# Patient Record
Sex: FEMALE | Race: BLACK OR AFRICAN AMERICAN | HISPANIC OR LATINO | Employment: PART TIME | URBAN - METROPOLITAN AREA
[De-identification: names, ages, dates, MRNs, and addresses within clinical notes are randomized per-mention and may not be internally consistent; named-entity substitution may affect disease eponyms.]

---

## 2017-01-03 ENCOUNTER — HOSPITAL ENCOUNTER (EMERGENCY)
Facility: HOSPITAL | Age: 22
Discharge: HOME | End: 2017-01-03
Attending: EMERGENCY MEDICINE
Payer: MEDICAID

## 2017-01-03 VITALS
BODY MASS INDEX: 31.39 KG/M2 | TEMPERATURE: 98 F | HEIGHT: 67 IN | RESPIRATION RATE: 18 BRPM | DIASTOLIC BLOOD PRESSURE: 65 MMHG | WEIGHT: 200 LBS | HEART RATE: 62 BPM | SYSTOLIC BLOOD PRESSURE: 139 MMHG | OXYGEN SATURATION: 100 %
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
## 2017-01-03 DIAGNOSIS — M25.552 LEFT HIP PAIN: ICD-10-CM

## 2017-01-03 DIAGNOSIS — S39.012A LOW BACK STRAIN, INITIAL ENCOUNTER: Primary | ICD-10-CM

## 2017-01-03 LAB
B-HCG UR QL: NEGATIVE
CTP QC/QA: YES

## 2017-01-03 PROCEDURE — 25000003 PHARM REV CODE 250: Performed by: PHYSICIAN ASSISTANT

## 2017-01-03 PROCEDURE — 99284 EMERGENCY DEPT VISIT MOD MDM: CPT

## 2017-01-03 RX ORDER — CYCLOBENZAPRINE HCL 10 MG
10 TABLET ORAL
Status: DISCONTINUED | OUTPATIENT
Start: 2017-01-03 | End: 2017-01-03 | Stop reason: HOSPADM

## 2017-01-03 RX ORDER — IBUPROFEN 600 MG/1
600 TABLET ORAL EVERY 6 HOURS PRN
Qty: 20 TABLET | Refills: 0 | Status: SHIPPED | OUTPATIENT
Start: 2017-01-03 | End: 2017-02-08

## 2017-01-03 RX ORDER — KETOROLAC TROMETHAMINE 10 MG/1
10 TABLET, FILM COATED ORAL
Status: COMPLETED | OUTPATIENT
Start: 2017-01-03 | End: 2017-01-03

## 2017-01-03 RX ORDER — CYCLOBENZAPRINE HCL 5 MG
5 TABLET ORAL 3 TIMES DAILY PRN
Qty: 15 TABLET | Refills: 0 | Status: SHIPPED | OUTPATIENT
Start: 2017-01-03 | End: 2017-01-13

## 2017-01-03 RX ADMIN — KETOROLAC TROMETHAMINE 10 MG: 10 TABLET, FILM COATED ORAL at 11:01

## 2017-01-03 NOTE — DISCHARGE INSTRUCTIONS
Back Sprain or Strain    Injury to the muscles (strain) or ligaments (sprain) around the spine can be troubling. Injury may occur after a sudden forceful twisting or bending force such as in a car accident, after a simple awkward movement, or after lifting something heavy with poor body positioning. In any case, muscle spasm is often present and adds to the pain.  Thankfully, most people feel better in 1 to 2 weeks, and most of the rest in 1 to 2 months. Most people can remain active. Unless you had a forceful or traumatic physical injury such as a car accident or fall, X-rays may not be ordered for the first evaluation of a back sprain or strain. If pain continues and does not respond to medical treatment, your healthcare provider may then order X-rays and other tests.  Home care  The following guidelines will help you care for your injury at home:  · When in bed, try to find a comfortable position. A firm mattress is best. Try lying flat on your back with pillows under your knees. You can also try lying on your side with your knees bent up toward your chest and a pillow between your knees.  · Don't sit for long periods. Try not to take long car rides or take other trips that have you sitting for a long time. This puts more stress on the lower back than standing or walking.  · During the first 24 to 72 hours after an injury or flare-up, apply an ice pack to the painful area for 20 minutes. Then remove it for 20 minutes. Do this for 60 to 90 minutes, or several times a day. This will reduce swelling and pain. Be sure to wrap the ice pack in a thin towel or plastic to protect your skin.  · You can start with ice, then switch to heat. Heat from a hot shower, hot bath, or heating pad reduces pain and works well for muscle spasms. Put heat on the painful area for 20 minutes, then remove for 20 minutes. Do this for 60 to 90 minutes, or several times a day. Do not use a heating pad while sleeping. It can burn the  skin.  · You can alternate the ice and heat. Talk with your healthcare provider to find out the best treatment or therapy for your back pain.  · Therapeutic massage will help relax the back muscles without stretching them.  · Be aware of safe lifting methods. Do not lift anything over 15 pounds until all of the pain is gone.  Medicines  Talk to your healthcare provider before using medicines, especially if you have other health problems or are taking other medicines.  · You may use acetaminophen or ibuprofen to control pain, unless another pain medicine was prescribed. If you have chronic conditions like diabetes, liver or kidney disease, stomach ulcers, or gastrointestinal bleeding, or are taking blood-thinner medicines, talk with your doctor before taking any medicines.  · Be careful if you are given prescription medicines, narcotics, or medicine for muscle spasm. They can cause drowsiness, and affect your coordination, reflexes, and judgment. Do not drive or operate heavy machinery when taking these types of medicines. Only take pain medicine as prescribed by your healthcare provider.  Follow-up care  Follow up with your healthcare provider, or as advised. You may need physical therapy or more tests if your symptoms get worse.  If you had X-rays your healthcare provider may be checking for any broken bones, breaks, or fractures. Bruises and sprains can sometimes hurt as much as a fracture. These injuries can take time to heal completely. If your symptoms dont improve or they get worse, talk with your healthcare provider. You may need a repeat X-ray or other tests.  Call 911  Call for emergency care if any of the following occur:  · Trouble breathing  · Confused  · Very drowsy or trouble awakening  · Fainting or loss of consciousness  · Rapid or very slow heart rate  · Loss of bowel or bladder control  When to seek medical advice  Call your healthcare provider right away if any of the following occur:  · Pain  gets worse or spreads to your arms or legs  · Weakness or numbness in one or both arms or legs  · Numbness in the groin or genital area  © 6886-3485 [employer]. [address] [place] [address], [place] [address]. All rights reserved. This information is not intended as a substitute for professional medical care. Always follow your healthcare professional's instructions.          Hip Contusion    A contusion is another word for a bruise. It happens when small blood vessels break open and leak blood into the nearby area. A hip contusion can result from a bump, hit, or fall. Symptoms of a contusion often include changes in skin color (bruising), swelling, and pain. It may take several hours for a deep bruise to show up. If the injury is severe, you may need an x-ray to check for broken bones. Swelling should decrease in a few days. Bruising and pain may take several weeks to go away.  Home care  · Unless another medicine was prescribed, you may take acetaminophen, ibuprofen, or naproxen to help relieve pain and swelling. If needed, stronger pain medicines may be prescribed. Take all medicines exactly as directed.  · Ice the bruised area to help reduce pain and swelling. Wrap a cold source (ice pack or ice cubes in a plastic bag) in a thin towel. Apply the cold source to the bruised area for 20 minutes every 1 to 2 hours the first day. Continue this 3 to 4 times a day until the pain and swelling goes away.  · If walking causes pain, use crutches or a walker until you can walk without pain. These items can be rented at most pharmacies and orthopedic supply stores.  · If your injury is keeping you from moving around or caring for yourself properly, you may qualify for services such as home health care. Check with your insurance company to see if this type of care is covered.  Follow-up  Follow up with your healthcare provider, or as advised.  When to seek medical advice   Call your healthcare provider right away if  any of these occur:  · Increased pain, bruising, or swelling near the injured area  · Decreased ability to bear weight on the injured side  · Pain or swelling develops below the knee  · Chest pain or shortness of breath  © 2855-9219 [employer]. [address] [place] [address], [place] [address]. All rights reserved. This information is not intended as a substitute for professional medical care. Always follow your healthcare professional's instructions.
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
## 2017-01-30 ENCOUNTER — PATIENT MESSAGE (OUTPATIENT)
Dept: OBSTETRICS AND GYNECOLOGY | Facility: CLINIC | Age: 22
End: 2017-01-30

## 2017-02-08 ENCOUNTER — OFFICE VISIT (OUTPATIENT)
Dept: OBSTETRICS AND GYNECOLOGY | Facility: CLINIC | Age: 22
End: 2017-02-08
Payer: MEDICAID

## 2017-02-08 VITALS
HEIGHT: 67 IN | SYSTOLIC BLOOD PRESSURE: 116 MMHG | BODY MASS INDEX: 31.56 KG/M2 | WEIGHT: 201.06 LBS | DIASTOLIC BLOOD PRESSURE: 60 MMHG

## 2017-02-08 DIAGNOSIS — Z12.4 CERVICAL CANCER SCREENING: ICD-10-CM

## 2017-02-08 DIAGNOSIS — Z01.419 ROUTINE GYNECOLOGICAL EXAMINATION: Primary | ICD-10-CM

## 2017-02-08 PROCEDURE — 99999 PR PBB SHADOW E&M-EST. PATIENT-LVL III: CPT | Mod: PBBFAC,,, | Performed by: OBSTETRICS & GYNECOLOGY

## 2017-02-08 PROCEDURE — 99395 PREV VISIT EST AGE 18-39: CPT | Mod: S$PBB,,, | Performed by: OBSTETRICS & GYNECOLOGY

## 2017-02-08 PROCEDURE — 88175 CYTOPATH C/V AUTO FLUID REDO: CPT

## 2017-02-08 PROCEDURE — 99213 OFFICE O/P EST LOW 20 MIN: CPT | Mod: PBBFAC,PO | Performed by: OBSTETRICS & GYNECOLOGY

## 2017-02-08 PROCEDURE — 87591 N.GONORRHOEAE DNA AMP PROB: CPT

## 2017-02-08 NOTE — PROGRESS NOTES
"23 yo  female who presents for routine gyn visit.  No complaints today. No longer on contraception because it "ruins my body"  Using condoms to prevent pregnancy.  Was on Depo provera. Did not get injection in oct 2016. Did not have menstrual cycle until 2017.  Patient's last menstrual period was 01/15/2017.  No new sex partners since last visit.  Desires STD testing.    ROS:  GENERAL: Denies weight gain or weight loss. Feeling well overall.   SKIN: Denies rash or lesions.   HEAD: Denies head injury or headache.   CARDIOVASCULAR: Denies palpitations or left sided chest pain.   ABDOMEN: No abdominal pain, constipation, diarrhea, nausea, vomiting or rectal bleeding.   URINARY: No frequency, dysuria, hematuria, or burning on urination.  REPRODUCTIVE: See HPI.   BREASTS:  denies pain, lumps, or nipple discharge.   HEMATOLOGIC: No easy bruisability or excessive bleeding.   MUSCULOSKELETAL: Denies joint pain or swelling.   NEUROLOGIC: Denies syncope or weakness.   PSYCHIATRIC: Denies depression, anxiety or mood swings.         PE:   Vitals:   Visit Vitals    /60    Ht 5' 7" (1.702 m)    Wt 91.2 kg (201 lb 1 oz)    LMP 01/15/2017    Breastfeeding No    BMI 31.49 kg/m2     APPEARANCE: Well nourished, well developed, in no acute distress.  SKIN: Normal skin turgor, no lesions.  CHEST: Lungs clear to auscultation.  HEART: Regular rate and rhythm, no murmurs, rubs or gallops.  ABDOMEN: Soft. No tenderness or masses. No hepatosplenomegaly. No hernias.  BREASTS: Symmetrical, no skin changes or visible lesions. No palpable masses, nipple discharge or adenopathy bilaterally.  PELVIC: Normal external female genitalia without lesions. Normal hair distribution. Adequate perineal body, normal urethral meatus. Vagina moist and well rugated without lesions or discharge. Cervix pink and without lesions. No significant cystocele or rectocele. Bimanual exam showed uterus normal size, shape, position, mobile and " nontender. Adnexa without masses or tenderness. Urethra and bladder normal.  EXTREMITIES: No clubbing cyanosis or edema.      AP  Routine gyn  -s/p normal breast exam:   -s/p normal pelvic exam:   -Pap collected  -STD testing: Gc/chl collected, declined HIV test  -contraception:declines, using condoms  -educated on gardasil vaccine (per patient, s/p 1 dose - will call office to schedule other doses - has to go to another appointment right now)        DANIELA Manzo MD

## 2017-02-10 LAB
C TRACH DNA SPEC QL NAA+PROBE: NEGATIVE
N GONORRHOEA DNA SPEC QL NAA+PROBE: NEGATIVE

## 2017-03-23 ENCOUNTER — PATIENT MESSAGE (OUTPATIENT)
Dept: OBSTETRICS AND GYNECOLOGY | Facility: CLINIC | Age: 22
End: 2017-03-23

## 2017-03-24 ENCOUNTER — TELEPHONE (OUTPATIENT)
Dept: OBSTETRICS AND GYNECOLOGY | Facility: CLINIC | Age: 22
End: 2017-03-24

## 2017-03-24 NOTE — TELEPHONE ENCOUNTER
Hi doctor Jovany. I was wondering if it's normal to get a period twice a month, because I have for the past two months and I'm starting to get concerned because of the pain and discomfort. It looks like a regular menstrual cycle. Nothing abnormal other than the pain.

## 2017-03-31 NOTE — TELEPHONE ENCOUNTER
"It's not "normal" to get a cycle twice a month.  Please let me know if this happens again next month.    Dr luke  "

## 2017-04-03 ENCOUNTER — TELEPHONE (OUTPATIENT)
Dept: OBSTETRICS AND GYNECOLOGY | Facility: CLINIC | Age: 22
End: 2017-04-03

## 2017-04-03 NOTE — TELEPHONE ENCOUNTER
Spoke with pt. And was told its okay to have two cycles in two months, to keep us posted if this happens again next month.  She said okay and thank you

## 2017-08-07 ENCOUNTER — OFFICE VISIT (OUTPATIENT)
Dept: OBSTETRICS AND GYNECOLOGY | Facility: CLINIC | Age: 22
End: 2017-08-07
Payer: MEDICAID

## 2017-08-07 VITALS
WEIGHT: 193.81 LBS | DIASTOLIC BLOOD PRESSURE: 66 MMHG | SYSTOLIC BLOOD PRESSURE: 104 MMHG | HEIGHT: 67 IN | BODY MASS INDEX: 30.42 KG/M2

## 2017-08-07 DIAGNOSIS — R10.2 PELVIC PAIN IN FEMALE: Primary | ICD-10-CM

## 2017-08-07 DIAGNOSIS — R10.2 PELVIC PAIN IN FEMALE: ICD-10-CM

## 2017-08-07 PROCEDURE — 3008F BODY MASS INDEX DOCD: CPT | Mod: ,,, | Performed by: OBSTETRICS & GYNECOLOGY

## 2017-08-07 PROCEDURE — 76830 TRANSVAGINAL US NON-OB: CPT | Mod: PBBFAC,PO

## 2017-08-07 PROCEDURE — 99213 OFFICE O/P EST LOW 20 MIN: CPT | Mod: S$PBB,25,, | Performed by: OBSTETRICS & GYNECOLOGY

## 2017-08-07 PROCEDURE — 99999 PR PBB SHADOW E&M-EST. PATIENT-LVL III: CPT | Mod: PBBFAC,,, | Performed by: OBSTETRICS & GYNECOLOGY

## 2017-08-07 PROCEDURE — 76830 TRANSVAGINAL US NON-OB: CPT | Mod: 26,S$PBB,, | Performed by: OBSTETRICS & GYNECOLOGY

## 2017-08-07 NOTE — PROGRESS NOTES
"21 yo  female who presents with 3 months of pelvic pain. Reports pain x 3 months, mostly LLQ.  No radiation. Nothing makes pain worse or better.  Patient with h/o constipation. Does not believe that this pain is related to constipation.  One new sexual partner since last visit here. Infrequent condom use.    ROS: per HPI    PE:   Vitals: /66   Ht 5' 7" (1.702 m)   Wt 87.9 kg (193 lb 12.6 oz)   LMP 2017   Breastfeeding? No   BMI 30.35 kg/m²   APPEARANCE: Well nourished, well developed, in no acute distress.  ABDOMEN: Soft. No tenderness or masses. No hepatosplenomegaly. No hernias. obese  PELVIC: Normal external female genitalia without lesions. Normal hair distribution. Adequate perineal body, normal urethral meatus. Vagina moist and well rugated without lesions or discharge. Cervix pink and without lesions. No significant cystocele or rectocele. Bimanual exam showed uterus normal size, shape, position, mobile and nontender. Adnexa without masses or tenderness. Urethra and bladder normal.      AP  Pelvic Pain  -UPT negative  -urine cx, urine gc/chl, affirm collected  -pelvic US: normal    If all swabs/cultures are negative for infection, recommend f/u with PCP    candace Manzo MD  "

## 2017-08-08 DIAGNOSIS — N76.0 BV (BACTERIAL VAGINOSIS): Primary | ICD-10-CM

## 2017-08-08 DIAGNOSIS — B96.89 BV (BACTERIAL VAGINOSIS): Primary | ICD-10-CM

## 2017-08-08 LAB
CANDIDA RRNA VAG QL PROBE: NEGATIVE
G VAGINALIS RRNA GENITAL QL PROBE: POSITIVE
T VAGINALIS RRNA GENITAL QL PROBE: NEGATIVE

## 2017-08-08 RX ORDER — METRONIDAZOLE 500 MG/1
TABLET ORAL
Qty: 30 TABLET | Refills: 3 | Status: SHIPPED | OUTPATIENT
Start: 2017-08-08 | End: 2017-12-27

## 2017-08-09 DIAGNOSIS — N72 CERVICITIS: Primary | ICD-10-CM

## 2017-08-09 DIAGNOSIS — Z11.3 VENEREAL DISEASE SCREENING: ICD-10-CM

## 2017-08-09 LAB
BACTERIA UR CULT: NO GROWTH
C TRACH DNA SPEC QL NAA+PROBE: NOT DETECTED
N GONORRHOEA DNA SPEC QL NAA+PROBE: DETECTED

## 2017-08-09 RX ORDER — AZITHROMYCIN 500 MG/1
TABLET, FILM COATED ORAL
Qty: 4 TABLET | Refills: 1 | Status: SHIPPED | OUTPATIENT
Start: 2017-08-09 | End: 2017-12-27

## 2017-08-09 NOTE — PROGRESS NOTES
Patient noted to be positive for gonorrhea.  Will treat with azithromycin and rocephin.  Order for HIV and RPR placed.    candace luke MD

## 2017-08-11 ENCOUNTER — TELEPHONE (OUTPATIENT)
Dept: OBSTETRICS AND GYNECOLOGY | Facility: CLINIC | Age: 22
End: 2017-08-11

## 2017-08-11 NOTE — TELEPHONE ENCOUNTER
----- Message from Alee Hale sent at 8/11/2017 10:06 AM CDT -----  Contact: [phone]  Patient is returning your call      Informed patient of results, appts were made already

## 2017-08-11 NOTE — TELEPHONE ENCOUNTER
Left a message for the patient to call the office.  Please review both affirm result and gc/ct results.  Urgent.

## 2017-12-19 ENCOUNTER — TELEPHONE (OUTPATIENT)
Dept: FAMILY MEDICINE | Facility: HOSPITAL | Age: 22
End: 2017-12-19
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
## 2017-12-27 ENCOUNTER — HOSPITAL ENCOUNTER (OUTPATIENT)
Dept: RADIOLOGY | Facility: HOSPITAL | Age: 22
Discharge: HOME | End: 2017-12-27
Attending: FAMILY MEDICINE
Payer: MEDICAID

## 2017-12-27 ENCOUNTER — OFFICE VISIT (OUTPATIENT)
Dept: FAMILY MEDICINE | Facility: HOSPITAL | Age: 22
End: 2017-12-27
Attending: FAMILY MEDICINE
Payer: MEDICAID

## 2017-12-27 VITALS
BODY MASS INDEX: 29.2 KG/M2 | DIASTOLIC BLOOD PRESSURE: 66 MMHG | HEIGHT: 68 IN | SYSTOLIC BLOOD PRESSURE: 117 MMHG | WEIGHT: 192.69 LBS | HEART RATE: 69 BPM

## 2017-12-27 DIAGNOSIS — M25.561 ACUTE PAIN OF RIGHT KNEE: Primary | ICD-10-CM

## 2017-12-27 DIAGNOSIS — M25.561 ACUTE PAIN OF RIGHT KNEE: ICD-10-CM

## 2017-12-27 PROCEDURE — 73590 X-RAY EXAM OF LOWER LEG: CPT | Mod: TC,RT

## 2017-12-27 PROCEDURE — 99214 OFFICE O/P EST MOD 30 MIN: CPT | Performed by: FAMILY MEDICINE

## 2017-12-27 PROCEDURE — 73590 X-RAY EXAM OF LOWER LEG: CPT | Mod: 26,RT,, | Performed by: RADIOLOGY

## 2017-12-27 PROCEDURE — 73564 X-RAY EXAM KNEE 4 OR MORE: CPT | Mod: TC,RT

## 2017-12-27 PROCEDURE — 73564 X-RAY EXAM KNEE 4 OR MORE: CPT | Mod: 26,RT,, | Performed by: RADIOLOGY

## 2017-12-27 RX ORDER — NAPROXEN 500 MG/1
500 TABLET ORAL 2 TIMES DAILY
Qty: 60 TABLET | Refills: 11 | Status: SHIPPED | OUTPATIENT
Start: 2017-12-27 | End: 2018-03-12
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
## 2017-12-28 NOTE — PROGRESS NOTES
I assume primary medical responsibility for this patient, I have reviewed the case history, findings, diagnosis and treatment plan with the resident and agree that the care is reasonable and necessary. This service has been performed by a resident without the presence of a teaching physician under the primary care exception  Zoe Charles  12/28/2017

## 2017-12-31 DIAGNOSIS — M25.561 ACUTE PAIN OF RIGHT KNEE: ICD-10-CM

## 2018-01-04 ENCOUNTER — HOSPITAL ENCOUNTER (OUTPATIENT)
Dept: RADIOLOGY | Facility: HOSPITAL | Age: 23
Discharge: HOME | End: 2018-01-04
Attending: FAMILY MEDICINE
Payer: MEDICAID

## 2018-01-04 DIAGNOSIS — M25.561 ACUTE PAIN OF RIGHT KNEE: ICD-10-CM

## 2018-01-04 PROCEDURE — 73700 CT LOWER EXTREMITY W/O DYE: CPT | Mod: 26,RT,, | Performed by: RADIOLOGY

## 2018-01-04 PROCEDURE — 73700 CT LOWER EXTREMITY W/O DYE: CPT | Mod: TC,RT

## 2018-01-08 DIAGNOSIS — S89.91XS INJURY OF RIGHT KNEE, SEQUELA: Primary | ICD-10-CM

## 2018-01-10 ENCOUNTER — TELEPHONE (OUTPATIENT)
Dept: FAMILY MEDICINE | Facility: HOSPITAL | Age: 23
End: 2018-01-10

## 2018-02-05 DIAGNOSIS — M25.361 INSTABILITY OF RIGHT KNEE JOINT: Primary | ICD-10-CM

## 2018-02-28 ENCOUNTER — HOSPITAL ENCOUNTER (OUTPATIENT)
Dept: RADIOLOGY | Facility: HOSPITAL | Age: 23
Discharge: HOME | End: 2018-02-28
Payer: MEDICAID

## 2018-02-28 DIAGNOSIS — M25.361 INSTABILITY OF RIGHT KNEE JOINT: ICD-10-CM

## 2018-02-28 DIAGNOSIS — S89.91XS INJURY OF RIGHT KNEE, SEQUELA: ICD-10-CM

## 2018-02-28 PROCEDURE — 73721 MRI JNT OF LWR EXTRE W/O DYE: CPT | Mod: TC,RT

## 2018-02-28 PROCEDURE — 73721 MRI JNT OF LWR EXTRE W/O DYE: CPT | Mod: 26,RT,, | Performed by: RADIOLOGY

## 2018-03-12 ENCOUNTER — OFFICE VISIT (OUTPATIENT)
Dept: OBSTETRICS AND GYNECOLOGY | Facility: CLINIC | Age: 23
End: 2018-03-12
Payer: MEDICAID

## 2018-03-12 VITALS
SYSTOLIC BLOOD PRESSURE: 110 MMHG | HEIGHT: 67 IN | BODY MASS INDEX: 29.58 KG/M2 | WEIGHT: 188.5 LBS | DIASTOLIC BLOOD PRESSURE: 80 MMHG

## 2018-03-12 DIAGNOSIS — Z11.3 SCREENING EXAMINATION FOR STD (SEXUALLY TRANSMITTED DISEASE): Primary | ICD-10-CM

## 2018-03-12 PROCEDURE — 87480 CANDIDA DNA DIR PROBE: CPT

## 2018-03-12 PROCEDURE — 99213 OFFICE O/P EST LOW 20 MIN: CPT | Mod: PBBFAC,PO | Performed by: OBSTETRICS & GYNECOLOGY

## 2018-03-12 PROCEDURE — 99999 PR PBB SHADOW E&M-EST. PATIENT-LVL III: CPT | Mod: PBBFAC,,, | Performed by: OBSTETRICS & GYNECOLOGY

## 2018-03-12 PROCEDURE — 99213 OFFICE O/P EST LOW 20 MIN: CPT | Mod: S$PBB,,, | Performed by: OBSTETRICS & GYNECOLOGY

## 2018-03-12 PROCEDURE — 87491 CHLMYD TRACH DNA AMP PROBE: CPT

## 2018-03-12 NOTE — PROGRESS NOTES
"GYNECOLOGY  :  Deepa Charlton is a 23 y.o.    Dr Manzo Patient     Here today for  Vaginal discharge and STD screening   Hx of gonorrhea last year.   Unsure if partner was tested/treated         Past Medical History:   Diagnosis Date    Asthma     Ovarian cyst     left     History reviewed. No pertinent surgical history.  Family History   Problem Relation Age of Onset    Migraines Mother      Social History   Substance Use Topics    Smoking status: Current Every Day Smoker     Packs/day: 0.33     Types: Cigarettes    Smokeless tobacco: Never Used    Alcohol use No     OB History    Para Term  AB Living   1 1 1 0 0 1   SAB TAB Ectopic Multiple Live Births   0 0 0 0 1      # Outcome Date GA Lbr Omer/2nd Weight Sex Delivery Anes PTL Lv   1 Term 10/20/15 39w4d  3.741 kg (8 lb 4 oz) M Vag-Spont Spinal, EPI N TYRELL          /80   Ht 5' 7" (1.702 m)   Wt 85.5 kg (188 lb 7.9 oz)   LMP 2018   BMI 29.52 kg/m²     [place]    ROS  GENERAL: Denies significant weight gain or weight loss. Feeling well overall.  SKIN: Denies rash or lesions.  Normal skin turgor  HEAD: Denies head injury or headache.   NODES: Denies enlarged lymph nodes.   CHEST: Denies chest pain or shortness of breath.   CARDIOVASCULAR: Denies palpitations or left sided chest pain.   ABDOMEN: No abdominal pain,no  diarrhea,constipation  nausea, vomiting or rectal bleeding.   URINARY: normal  Frequency,no  Dysuria or burning on urination.   REPRODUCTIVE: Per HPI   BREASTS: The patient sometimes performs breast self-examination and denies pain, lumps, or nipple discharge.   HEMATOLOGIC: No easy bruisability or excessive bleeding.   MUSCULOSKELETAL: Denies joint pain or swelling.   NEUROLOGIC: Denies syncope or weakness.   PSYCHIATRIC: Denies depression, anxiety or mood swings.    Physical Exam     Constitutional: She is oriented to person, place, and time. She appears well-developed and well-nourished. No distress.   HENT: "   Head: Normocephalic.   NECK: Neck symmetric without masses or thyromegaly.  Cardiovascular: Normal rate and regular rhythm.   Pulmonary/Chest: Effort normal and breath sounds normal. No respiratory distress. She has no wheezes.   Breasts: Symmetrical, no skin changes or visible lesions. No palpable masses, nipple discharge or adenopathy bilaterally.  Abdominal: Soft not distended. Bowel sounds are normal. She exhibits   no masses . No tenderness to palpation.   Genitourinary: Pelvic exam was performed with patient supine.   External genitalia normal no lesions.Normal hair distribution   Adequate perineal body,normal urethral meatus   Vagina moist and well rugated without lesions, no vaginal  Discharge.   Cervix pink and without lesions. No bleeding. No significant cystocele or rectocele.  Bimanual exam showed uterus normal size, shape and position , mobile and nontender. Adnexa without masses or tenderness. Urethra and bladder normal  Extremities normal no cyanosis ,edema.     Procedures:  STD testing     A/P Deepa Charlton 23 y.o.     Dx=  1- STD screening   2- Vaginal discharge   3-    Plan:  Will revie Lbs and cultures       Patient was counseled today on A.C.S. Pap guidelines and recommendations for yearly pelvic exams, mammograms and monthly self breast exams; to see her PCP for other health maintenance, nutrition and weight gain counseling, discussed lab values.  Discussed colonoscopy recommendations every 10 years starting at age 50   Calcium and vit D daily intake     F/u in 1 yr or BRADYN    Naren Liu M.D.   OB/GYN

## 2018-03-13 LAB
C TRACH DNA SPEC QL NAA+PROBE: NOT DETECTED
CANDIDA RRNA VAG QL PROBE: NEGATIVE
G VAGINALIS RRNA GENITAL QL PROBE: NEGATIVE
N GONORRHOEA DNA SPEC QL NAA+PROBE: NOT DETECTED
T VAGINALIS RRNA GENITAL QL PROBE: NEGATIVE

## 2018-03-14 ENCOUNTER — LAB VISIT (OUTPATIENT)
Dept: LAB | Facility: HOSPITAL | Age: 23
End: 2018-03-14
Attending: FAMILY MEDICINE
Payer: MEDICAID

## 2018-03-14 DIAGNOSIS — Z11.3 SCREENING EXAMINATION FOR STD (SEXUALLY TRANSMITTED DISEASE): ICD-10-CM

## 2018-03-14 PROCEDURE — 87340 HEPATITIS B SURFACE AG IA: CPT

## 2018-03-14 PROCEDURE — 36415 COLL VENOUS BLD VENIPUNCTURE: CPT

## 2018-03-14 PROCEDURE — 86703 HIV-1/HIV-2 1 RESULT ANTBDY: CPT

## 2018-03-14 PROCEDURE — 86592 SYPHILIS TEST NON-TREP QUAL: CPT

## 2018-03-15 LAB
HBV SURFACE AG SERPL QL IA: NEGATIVE
HIV 1+2 AB+HIV1 P24 AG SERPL QL IA: NEGATIVE
RPR SER QL: NORMAL

## 2018-03-19 ENCOUNTER — TELEPHONE (OUTPATIENT)
Dept: OBSTETRICS AND GYNECOLOGY | Facility: CLINIC | Age: 23
End: 2018-03-19

## 2018-05-02 ENCOUNTER — HOSPITAL ENCOUNTER (EMERGENCY)
Facility: HOSPITAL | Age: 23
Discharge: HOME | End: 2018-05-02
Attending: EMERGENCY MEDICINE
Payer: MEDICAID

## 2018-05-02 VITALS
HEART RATE: 54 BPM | SYSTOLIC BLOOD PRESSURE: 112 MMHG | TEMPERATURE: 98 F | OXYGEN SATURATION: 100 % | HEIGHT: 67 IN | DIASTOLIC BLOOD PRESSURE: 64 MMHG | RESPIRATION RATE: 20 BRPM | BODY MASS INDEX: 29.03 KG/M2 | WEIGHT: 185 LBS

## 2018-05-02 DIAGNOSIS — R42 DIZZINESS: ICD-10-CM

## 2018-05-02 LAB
ALBUMIN SERPL BCP-MCNC: 4.3 G/DL
ALP SERPL-CCNC: 95 U/L
ALT SERPL W/O P-5'-P-CCNC: 24 U/L
ANION GAP SERPL CALC-SCNC: 9 MMOL/L
AST SERPL-CCNC: 19 U/L
B-HCG UR QL: NEGATIVE
BASOPHILS # BLD AUTO: 0.03 K/UL
BASOPHILS NFR BLD: 0.3 %
BILIRUB SERPL-MCNC: 0.5 MG/DL
BILIRUB UR QL STRIP: NEGATIVE
BUN SERPL-MCNC: 10 MG/DL
CALCIUM SERPL-MCNC: 9.6 MG/DL
CHLORIDE SERPL-SCNC: 103 MMOL/L
CLARITY UR: CLEAR
CO2 SERPL-SCNC: 26 MMOL/L
COLOR UR: YELLOW
CREAT SERPL-MCNC: 0.7 MG/DL
CTP QC/QA: YES
DIFFERENTIAL METHOD: ABNORMAL
EOSINOPHIL # BLD AUTO: 0.2 K/UL
EOSINOPHIL NFR BLD: 1.5 %
ERYTHROCYTE [DISTWIDTH] IN BLOOD BY AUTOMATED COUNT: 12.7 %
EST. GFR  (AFRICAN AMERICAN): >60 ML/MIN/1.73 M^2
EST. GFR  (NON AFRICAN AMERICAN): >60 ML/MIN/1.73 M^2
GLUCOSE SERPL-MCNC: 96 MG/DL
GLUCOSE UR QL STRIP: NEGATIVE
HCT VFR BLD AUTO: 39.4 %
HGB BLD-MCNC: 12.8 G/DL
HGB UR QL STRIP: NEGATIVE
KETONES UR QL STRIP: NEGATIVE
LEUKOCYTE ESTERASE UR QL STRIP: NEGATIVE
LIPASE SERPL-CCNC: 14 U/L
LYMPHOCYTES # BLD AUTO: 3.2 K/UL
LYMPHOCYTES NFR BLD: 29.3 %
MCH RBC QN AUTO: 28.7 PG
MCHC RBC AUTO-ENTMCNC: 32.5 G/DL
MCV RBC AUTO: 88 FL
MONOCYTES # BLD AUTO: 0.6 K/UL
MONOCYTES NFR BLD: 5.6 %
NEUTROPHILS # BLD AUTO: 7 K/UL
NEUTROPHILS NFR BLD: 63.2 %
NITRITE UR QL STRIP: NEGATIVE
PH UR STRIP: 8 [PH] (ref 5–8)
PLATELET # BLD AUTO: 421 K/UL
PMV BLD AUTO: 9.6 FL
POTASSIUM SERPL-SCNC: 4 MMOL/L
PROT SERPL-MCNC: 8.3 G/DL
PROT UR QL STRIP: ABNORMAL
RBC # BLD AUTO: 4.46 M/UL
SODIUM SERPL-SCNC: 138 MMOL/L
SP GR UR STRIP: 1.01 (ref 1–1.03)
URN SPEC COLLECT METH UR: ABNORMAL
UROBILINOGEN UR STRIP-ACNC: NEGATIVE EU/DL
WBC # BLD AUTO: 11 K/UL

## 2018-05-02 PROCEDURE — 96365 THER/PROPH/DIAG IV INF INIT: CPT | Mod: 59

## 2018-05-02 PROCEDURE — 96375 TX/PRO/DX INJ NEW DRUG ADDON: CPT

## 2018-05-02 PROCEDURE — 80053 COMPREHEN METABOLIC PANEL: CPT

## 2018-05-02 PROCEDURE — 85025 COMPLETE CBC W/AUTO DIFF WBC: CPT

## 2018-05-02 PROCEDURE — 25500020 PHARM REV CODE 255: Performed by: EMERGENCY MEDICINE

## 2018-05-02 PROCEDURE — 99285 EMERGENCY DEPT VISIT HI MDM: CPT | Mod: 25

## 2018-05-02 PROCEDURE — 81003 URINALYSIS AUTO W/O SCOPE: CPT

## 2018-05-02 PROCEDURE — 81025 URINE PREGNANCY TEST: CPT | Performed by: EMERGENCY MEDICINE

## 2018-05-02 PROCEDURE — 83690 ASSAY OF LIPASE: CPT

## 2018-05-02 PROCEDURE — 63600175 PHARM REV CODE 636 W HCPCS: Performed by: EMERGENCY MEDICINE

## 2018-05-02 PROCEDURE — 93005 ELECTROCARDIOGRAM TRACING: CPT

## 2018-05-02 PROCEDURE — 25000003 PHARM REV CODE 250: Performed by: EMERGENCY MEDICINE

## 2018-05-02 RX ORDER — CIPROFLOXACIN 500 MG/1
500 TABLET ORAL 2 TIMES DAILY
Qty: 10 TABLET | Refills: 0 | Status: SHIPPED | OUTPATIENT
Start: 2018-05-02 | End: 2018-05-12

## 2018-05-02 RX ORDER — KETOROLAC TROMETHAMINE 30 MG/ML
30 INJECTION, SOLUTION INTRAMUSCULAR; INTRAVENOUS
Status: COMPLETED | OUTPATIENT
Start: 2018-05-02 | End: 2018-05-02

## 2018-05-02 RX ORDER — ONDANSETRON 4 MG/1
8 TABLET, ORALLY DISINTEGRATING ORAL
Status: COMPLETED | OUTPATIENT
Start: 2018-05-02 | End: 2018-05-02

## 2018-05-02 RX ORDER — SODIUM CHLORIDE 9 MG/ML
1000 INJECTION, SOLUTION INTRAVENOUS
Status: COMPLETED | OUTPATIENT
Start: 2018-05-02 | End: 2018-05-02

## 2018-05-02 RX ORDER — ONDANSETRON 4 MG/1
4 TABLET, FILM COATED ORAL EVERY 6 HOURS
Qty: 12 TABLET | Refills: 0 | Status: SHIPPED | OUTPATIENT
Start: 2018-05-02 | End: 2018-07-27

## 2018-05-02 RX ORDER — DIPHENOXYLATE HYDROCHLORIDE AND ATROPINE SULFATE 2.5; .025 MG/1; MG/1
1 TABLET ORAL 4 TIMES DAILY PRN
Qty: 10 TABLET | Refills: 0 | Status: SHIPPED | OUTPATIENT
Start: 2018-05-02 | End: 2018-05-12

## 2018-05-02 RX ORDER — HYDROMORPHONE HYDROCHLORIDE 2 MG/ML
1 INJECTION, SOLUTION INTRAMUSCULAR; INTRAVENOUS; SUBCUTANEOUS
Status: COMPLETED | OUTPATIENT
Start: 2018-05-02 | End: 2018-05-02

## 2018-05-02 RX ADMIN — PROMETHAZINE HYDROCHLORIDE 12.5 MG: 25 INJECTION INTRAMUSCULAR; INTRAVENOUS at 08:05

## 2018-05-02 RX ADMIN — ONDANSETRON 8 MG: 4 TABLET, ORALLY DISINTEGRATING ORAL at 07:05

## 2018-05-02 RX ADMIN — IOHEXOL 75 ML: 350 INJECTION, SOLUTION INTRAVENOUS at 09:05

## 2018-05-02 RX ADMIN — SODIUM CHLORIDE 1000 ML: 0.9 INJECTION, SOLUTION INTRAVENOUS at 08:05

## 2018-05-02 RX ADMIN — HYDROMORPHONE HYDROCHLORIDE 1 MG: 2 INJECTION INTRAMUSCULAR; INTRAVENOUS; SUBCUTANEOUS at 09:05

## 2018-05-02 RX ADMIN — KETOROLAC TROMETHAMINE 30 MG: 30 INJECTION, SOLUTION INTRAMUSCULAR at 09:05

## 2018-05-03 ENCOUNTER — PES CALL (OUTPATIENT)
Dept: ADMINISTRATIVE | Facility: CLINIC | Age: 23
End: 2018-05-03

## 2018-05-03 NOTE — DISCHARGE INSTRUCTIONS
Please take prescribed medications as labeled. Follow-up with your PCP within 2-3 days and return to ED if symptoms worsen or change.

## 2018-05-03 NOTE — ED NOTES
"Pt presents to the ED w/ c/o of abd pain with n/v. Pt reports that the nausea started yesterday and the vomiting started today. Pt reports that she ate a porkchop on staurday and has had abd pain since. Pt reports that since Saturday she has had intermittent LLQ abd pain. Pt reports generalized headache and body weakness. Pt states "i feel like I cant breathe."   "

## 2018-05-03 NOTE — ED NOTES
Pt did not want rest of Dilaudid medication. Half of dilaudid given to pt. Reported feeling better after the toradol was given.

## 2018-05-03 NOTE — ED NOTES
Pt actively vomiting. Pt told she must give a urine specimen before she can have some medicines. Pt replied that she will try again.

## 2018-05-03 NOTE — ED PROVIDER NOTES
Encounter Date: 5/2/2018       History     Chief Complaint   Patient presents with    Vomiting     pt to triage ambulatory and reports vomiting today and having abd pain     Abdominal Pain     Time seen by provider: 8:27 PM    This is a 23 y.o. female who presents with complaint of dizziness, nausea, vomiting, and HA today. The patient reports dizziness for the past 3-4 days, but reports two episodes of vomiting today. Patient reports that she has been having cramping LLQ abdominal pain for the past couple of years. She also states that she has been having blurred vision and chills. The patient denies any radiation of her abdominal pain, but states that her pain is sometimes a 10/10. She reports eating a fried porkchop on Saturday (4/28), after which her and her  both had episodes of diarrhea. She also reports having loose stools recently, and states that her last BM was this morning. She states that she has been well-hydrated recently. Patient denies any current diarrhea, fever, dysuria, or any other concerning symptoms. This patient has a history of recurrent migraines, but she does not report taking medication for her current HA. Patient has no prior history of vertigo, diverticulosis, or abdominal surgeries. She does have a history of asthma, but states that she has not had an exacerbation in years.      The history is provided by the patient.     Review of patient's allergies indicates:  No Known Allergies  Past Medical History:   Diagnosis Date    Asthma     Ovarian cyst     left     No past surgical history on file.  Family History   Problem Relation Age of Onset    Migraines Mother      Social History   Substance Use Topics    Smoking status: Current Every Day Smoker     Packs/day: 0.33     Types: Cigarettes    Smokeless tobacco: Never Used    Alcohol use No     Review of Systems   Constitutional: Positive for chills. Negative for fever.   HENT: Negative for facial swelling and trouble  swallowing.    Eyes: Positive for visual disturbance (blurred vision). Negative for redness.   Respiratory: Negative for shortness of breath.    Cardiovascular: Negative for chest pain.   Gastrointestinal: Positive for abdominal pain, nausea and vomiting. Negative for diarrhea.   Genitourinary: Negative for dysuria and hematuria.   Musculoskeletal: Negative for gait problem.   Skin: Negative for rash.   Neurological: Positive for dizziness and headaches. Negative for facial asymmetry and speech difficulty.       Physical Exam     Initial Vitals [05/02/18 1859]   BP Pulse Resp Temp SpO2   127/61 (!) 58 18 98.6 °F (37 °C) 99 %      MAP       83         Physical Exam    Nursing note and vitals reviewed.  Constitutional: She is not diaphoretic. She is Obese .  Non-toxic appearance. She does not have a sickly appearance.   HENT:   Head: Normocephalic.   Right Ear: Hearing normal.   Left Ear: Hearing normal.   Nose: Nose normal.   Mouth/Throat: Uvula is midline, oropharynx is clear and moist and mucous membranes are normal.   Eyes: EOM and lids are normal. Pupils are equal, round, and reactive to light. Right eye exhibits normal extraocular motion and no nystagmus. Left eye exhibits normal extraocular motion and no nystagmus.   Neck: Trachea normal, normal range of motion, full passive range of motion without pain and phonation normal. Neck supple. No spinous process tenderness and no muscular tenderness present. Normal range of motion present. No neck rigidity.   Cardiovascular: Regular rhythm. Bradycardia present.    Pulses:       Radial pulses are 2+ on the right side, and 2+ on the left side.   Pulmonary/Chest: Effort normal and breath sounds normal.   Abdominal: Soft. Normal appearance and bowel sounds are normal. She exhibits no distension and no mass. There is tenderness in the left upper quadrant and left lower quadrant. There is no rigidity, no rebound, no guarding and no CVA tenderness.   Neurological: She is  alert and oriented to person, place, and time. Gait normal. GCS eye subscore is 4. GCS verbal subscore is 5. GCS motor subscore is 6.   Skin: Skin is warm, dry and intact. Capillary refill takes less than 2 seconds. No rash noted.   Psychiatric: She has a normal mood and affect. Her speech is normal and behavior is normal.         ED Course   Procedures  Labs Reviewed   CBC W/ AUTO DIFFERENTIAL - Abnormal; Notable for the following:        Result Value    Platelets 421 (*)     All other components within normal limits   URINALYSIS - Abnormal; Notable for the following:     Protein, UA Trace (*)     All other components within normal limits   COMPREHENSIVE METABOLIC PANEL   LIPASE   POCT URINE PREGNANCY        Imaging Results          CT Abdomen Pelvis With Contrast (Final result)  Result time 05/02/18 22:03:27    Final result by Donald Vera MD (05/02/18 22:03:27)                 Impression:      No acute findings in the abdomen or pelvis.      Electronically signed by: Donald Vera MD  Date:    05/02/2018  Time:    22:03             Narrative:    EXAMINATION:  CT ABDOMEN PELVIS WITH CONTRAST    CLINICAL HISTORY:  Abd pain, fever, abscess suspected;    TECHNIQUE:  Low dose axial images, sagittal and coronal reformations were obtained from the lung bases to the pubic symphysis following the IV administration of 75 mL of Omnipaque 350 .  Oral contrast was not given.    COMPARISON:  None.    FINDINGS:  Lower Chest:    Lung bases are clear.  Heart size is normal.    Abdomen:    Liver is normal in size and contour.  No focal hepatic lesion.  Gallbladder is unremarkable. No intrahepatic biliary ductal dilatation.    Spleen, adrenals, and pancreas are unremarkable.    The kidneys are symmetric.  No hydronephrosis.    No small bowel obstruction.  No inflammatory changes identified involving the gastrointestinal tract.  Appendix is normal.    No pneumoperitoneum or organized fluid collection.    No bulky  "lymphadenopathy.    Abdominal aorta is normal in caliber.    Portal, splenic, and superior mesenteric veins are patent.    Pelvis:    Urinary bladder, pelvic organs, and rectum are unremarkable.  No free fluid in the pelvis.  No pelvic lymphadenopathy.    Bones and soft tissues:    No aggressive osseous lesions.                                 Medical Decision Making:   Initial Assessment:   Patient is a 23-year-old female who presents with complaint of dizziness, nausea, vomiting and headache since today. Pt appears well, non-toxic. TTP to LUQ and LLQ. Normal bowel sounds. No guarding, rigidity, or distention. Negative peritoneal signs.   Differential Diagnosis:   Food poisoning, gastroenteritis, diverticulitis   Clinical Tests:   Lab Tests: Ordered and Reviewed  ED Management:  CBC, CMP, lipase, POCT pregnancy, urinalysis, EKG, CT abdomen pelvis, 8 mg PO zofran, 12.5 mg phenergan, 1L IVF, 1 mg dilaudid, 30 mg toradol   CT normal. EKG normal. Pt's s/s most consistent with food poisoning. Pt states that she "feels eladia", is stable and will be d/c home. Pt will be given prescriptions for cipro, zofran, and lomotil. Pt instructed to take all of prescribed antibiotic and to f/u with her PCP within 2-3 days. Pt instructed to hydrate well with oral fluids and to return to ED if symptoms worsen or change. Pt verbalized d/c instructions and is in compliance and agreement with tx plan.    Rx: cipro, zofran, lomotil                       Clinical Impression:   The primary encounter diagnosis was Food poisoning, undetermined intent, initial encounter. A diagnosis of Dizziness was also pertinent to this visit.                           Des Dubois NP  05/02/18 [id number]       Olvin Parrish MD  05/10/18 [id number]    "

## 2018-07-27 ENCOUNTER — OFFICE VISIT (OUTPATIENT)
Dept: OBSTETRICS AND GYNECOLOGY | Facility: CLINIC | Age: 23
End: 2018-07-27
Payer: MEDICAID

## 2018-07-27 VITALS
SYSTOLIC BLOOD PRESSURE: 102 MMHG | BODY MASS INDEX: 30.86 KG/M2 | WEIGHT: 196.63 LBS | HEIGHT: 67 IN | DIASTOLIC BLOOD PRESSURE: 60 MMHG

## 2018-07-27 DIAGNOSIS — R10.2 ACUTE PELVIC PAIN, FEMALE: Primary | ICD-10-CM

## 2018-07-27 PROCEDURE — 99213 OFFICE O/P EST LOW 20 MIN: CPT | Mod: S$PBB,,, | Performed by: OBSTETRICS & GYNECOLOGY

## 2018-07-27 PROCEDURE — 99212 OFFICE O/P EST SF 10 MIN: CPT | Mod: PBBFAC,PO | Performed by: OBSTETRICS & GYNECOLOGY

## 2018-07-27 PROCEDURE — 87660 TRICHOMONAS VAGIN DIR PROBE: CPT

## 2018-07-27 PROCEDURE — 87491 CHLMYD TRACH DNA AMP PROBE: CPT

## 2018-07-27 PROCEDURE — 87086 URINE CULTURE/COLONY COUNT: CPT

## 2018-07-27 PROCEDURE — 99999 PR PBB SHADOW E&M-EST. PATIENT-LVL II: CPT | Mod: PBBFAC,,, | Performed by: OBSTETRICS & GYNECOLOGY

## 2018-07-27 NOTE — PROGRESS NOTES
"24 yo female who presents to discuss 1 month history of LLQ pain. Reports that pain starts in upper L quadrant then radiates downward toward the vagina.  Pain has been getting worse x 1 month. Improves with motrin.  No new sex partners since last visit.  Desires to have STD testing today.  Denies pain with urination. NO problems with constipation or diarrhea.    ROS: per HPI    PE:   Vitals: /60   Ht 5' 7" (1.702 m)   Wt 89.2 kg (196 lb 10.4 oz)   LMP 07/12/2018   BMI 30.80 kg/m²   APPEARANCE: Well nourished, well developed, in no acute distress.  ABDOMEN: Soft. No tenderness or masses. No hepatosplenomegaly. No hernias.  PELVIC: Normal external female genitalia without lesions. Normal hair distribution. Adequate perineal body, normal urethral meatus. Vagina moist and well rugated without lesions or discharge. Cervix pink and without lesions. No significant cystocele or rectocele. Bimanual exam showed uterus normal size, shape, position, mobile and nontender. Adnexa without masses or tenderness. Urethra and bladder normal.  EXTREMITIES: No clubbing cyanosis or edema.    AP: Pelvic pain in female  -urine cx, gc/chl and affirm collected  -pelvic US ordered    candace luke MD  " 8

## 2018-07-29 LAB
BACTERIA UR CULT: NO GROWTH
C TRACH DNA SPEC QL NAA+PROBE: NOT DETECTED
N GONORRHOEA DNA SPEC QL NAA+PROBE: NOT DETECTED

## 2018-07-30 ENCOUNTER — PROCEDURE VISIT (OUTPATIENT)
Dept: OBSTETRICS AND GYNECOLOGY | Facility: CLINIC | Age: 23
End: 2018-07-30
Payer: MEDICAID

## 2018-07-30 DIAGNOSIS — R10.2 ACUTE PELVIC PAIN, FEMALE: ICD-10-CM

## 2018-07-30 DIAGNOSIS — N76.0 BV (BACTERIAL VAGINOSIS): Primary | ICD-10-CM

## 2018-07-30 DIAGNOSIS — B96.89 BV (BACTERIAL VAGINOSIS): Primary | ICD-10-CM

## 2018-07-30 PROCEDURE — 76830 TRANSVAGINAL US NON-OB: CPT | Mod: PBBFAC,PO

## 2018-07-30 PROCEDURE — 76830 TRANSVAGINAL US NON-OB: CPT | Mod: 26,S$PBB,, | Performed by: OBSTETRICS & GYNECOLOGY

## 2018-07-30 RX ORDER — METRONIDAZOLE 500 MG/1
500 TABLET ORAL
Qty: 14 TABLET | Refills: 0 | Status: SHIPPED | OUTPATIENT
Start: 2018-07-30 | End: 2018-08-06

## 2018-08-07 ENCOUNTER — OFFICE VISIT (OUTPATIENT)
Dept: FAMILY MEDICINE | Facility: HOSPITAL | Age: 23
End: 2018-08-07
Attending: FAMILY MEDICINE
Payer: MEDICAID

## 2018-08-07 VITALS
BODY MASS INDEX: 31.52 KG/M2 | DIASTOLIC BLOOD PRESSURE: 65 MMHG | WEIGHT: 200.81 LBS | HEIGHT: 67 IN | SYSTOLIC BLOOD PRESSURE: 113 MMHG | HEART RATE: 61 BPM

## 2018-08-07 DIAGNOSIS — R10.32 INTERMITTENT LEFT LOWER QUADRANT ABDOMINAL PAIN: Primary | ICD-10-CM

## 2018-08-07 PROCEDURE — 99213 OFFICE O/P EST LOW 20 MIN: CPT | Performed by: FAMILY MEDICINE

## 2018-08-07 NOTE — PROGRESS NOTES
"Subjective:       Patient ID: Deepa Charlton is a 23 y.o. female.    Chief Complaint: Abdominal Pain    HPI Pt is 22 yo F who presents with complaints of left lower abdominal pain that began 1 month ago described as stabbing and sharp that lasts approx 20 ssec. Reports that "heavy foods" ie, fried foods and excess precipitate pain. Pain is intermittent and usually occurs approx 4x per week. Says lying down and stretching relieves pain and sitting up often makes pain worse. Pain sometimes radiates to suprapubic region. Reports no changes in bowel habits. Denies bloody stools, dysuria, GERD like symptoms. Per patient she was recently seen by ObGyn for same symptoms and diagnosed with BV and treated. Transvaginal US was ordered with no abnormal findings. Per chart check, CT abdomen/pelvis 5/2018 revealed no abnormal findings. Denies family hx of colon cancer.        Review of Systems   Constitutional: Negative for chills and fever.   Eyes: Negative for visual disturbance.   Cardiovascular: Negative for chest pain.   Gastrointestinal: Positive for abdominal pain.   Endocrine: Negative for polyuria.   Genitourinary: Negative for dysuria.   Musculoskeletal: Negative for back pain.   Skin: Negative for color change.   Neurological: Negative for headaches.   Psychiatric/Behavioral: Negative for agitation and confusion.       Objective:      Vitals:    08/07/18 1601   BP: 113/65   Pulse: 61     Physical Exam   Constitutional: She is oriented to person, place, and time. She appears well-developed and well-nourished.   HENT:   Head: Normocephalic and atraumatic.   Neck: Normal range of motion.   Cardiovascular: Normal rate and regular rhythm.    Pulmonary/Chest: Effort normal and breath sounds normal.   Abdominal: Soft. Bowel sounds are normal. There is no tenderness. There is no guarding.   Musculoskeletal: Normal range of motion. She exhibits no edema or deformity.   Neurological: She is alert and oriented to person, place, " and time.   Skin: Skin is warm.   Psychiatric: She has a normal mood and affect. Her behavior is normal.       Assessment:       1. Intermittent left lower quadrant abdominal pain        Plan:       Intermittent left lower quadrant abdominal pain  -No abnormal findings on PE. Recent Transvaginal US and CT abdomen/Pelvis with normal findings  -Recommended eliminating processed foods which seem to be precipitating factor  -Also recommended trial of probiotics  -Return precautions such as worsening abdominal pain, bloody stools etc discussed    Follow-up in about 4 weeks (around 9/4/2018). f/u abdominal pain    Stephany Mcgovern MD  8/7/2018  [facility] HO[facility]

## 2018-08-07 NOTE — PROGRESS NOTES
I have reviewed the notes, assessments, and/or procedures performed by Dr. Mcgovern, I concur with her/his documentation of Deepa Charlton. Review of labs was also negative for pancreas and liver disease.

## 2018-08-27 ENCOUNTER — ANESTHESIA EVENT (OUTPATIENT)
Dept: SURGERY | Facility: HOSPITAL | Age: 23
End: 2018-08-27
Payer: MEDICAID

## 2018-08-27 NOTE — ANESTHESIA PREPROCEDURE EVALUATION
08/27/2018     Pre-operative evaluation for Procedure(s) (LRB):  RECONSTRUCTION, KNEE, ACL, HAMSTRING AUTOGRAFT (Right)  RECONSTRUCTION, LIGAMENT, MCL, USING  ACHILLES TENDON ALLOGRAFT (Right)    Deepa Walls is a 23 y.o. female with asthma       Past Medical History:   Diagnosis Date    Asthma     Ovarian cyst     left       Review of patient's allergies indicates:  No Known Allergies     No current outpatient medications on file prior to encounter.     No current facility-administered medications on file prior to encounter.        No past surgical history on file.    Social History     Socioeconomic History    Marital status: Single     Spouse name: Not on file    Number of children: Not on file    Years of education: Not on file    Highest education level: Not on file   Social Needs    Financial resource strain: Not on file    Food insecurity - worry: Not on file    Food insecurity - inability: Not on file    Transportation needs - medical: Not on file    Transportation needs - non-medical: Not on file   Occupational History    Not on file   Tobacco Use    Smoking status: Former Smoker     Packs/day: 0.33     Types: Cigarettes    Smokeless tobacco: Never Used   Substance and Sexual Activity    Alcohol use: No    Drug use: No    Sexual activity: Yes     Partners: Male     Birth control/protection: None   Other Topics Concern    Not on file   Social History Narrative    Not on file         Vital Signs Range (Last 24H):  Wt Readings from Last 1 Encounters:   08/07/18 91.1 kg (200 lb 13.4 oz)     Temp Readings from Last 1 Encounters:   05/02/18 36.7 °C (98 °F)     BP Readings from Last 1 Encounters:   08/07/18 113/65     Pulse Readings from Last 1 Encounters:   08/07/18 61     SpO2 Readings from Last 1 Encounters:   05/02/18 100%           CBC:   Lab Results   Component Value  Date    WBC 11.00 2018    HGB 12.8 2018    HCT 39.4 2018    MCV 88 2018     (H) 2018       CMP:     Chemistry        Component Value Date/Time     2018 2017    K 4.0 2018     2018    CO2 26 2018    BUN 10 2018    CREATININE 0.7 2018    GLU 96 2018        Component Value Date/Time    CALCIUM 9.6 2018    ALKPHOS 95 2018    AST 19 2018    ALT 24 2018    BILITOT 0.5 2018    ESTGFRAFRICA >60 2018    EGFRNONAA >60 2018          INR  No results found for: INR, PROTIME      Diagnostic Studies:      EK18   Marked sinus bradycardia  Abnormal ECG  No previous ECGs available  Confirmed by Fuad YEN     2D Echo: none on file          Anesthesia Evaluation    I have reviewed the Patient Summary Reports.    I have reviewed the Nursing Notes.   I have reviewed the Medications.     Review of Systems  Anesthesia Hx:  No previous Anesthesia  Neg history of prior surgery. Denies Family Hx of Anesthesia complications.   Denies Personal Hx of Anesthesia complications.   Social:  Former Smoker Quit 4 months   Hematology/Oncology:         -- Denies Anemia:   Cardiovascular:  Cardiovascular Normal Exercise tolerance: good     Pulmonary:   Asthma asymptomatic    Renal/:  Renal/ Normal     Hepatic/GI:  Hepatic/GI Normal    Neurological:   Denies Seizures. Hx of vertigo   Endocrine:  Endocrine Normal        Physical Exam  General:  Well nourished    Airway/Jaw/Neck:  Airway Findings: Mouth Opening: Normal Tongue: Normal  General Airway Assessment: Adult  Mallampati: III  Improves to I with phonation.  Jaw/Neck Findings:  Neck ROM: Normal ROM      Dental:  Dental Findings: In tact   Chest/Lungs:  Chest/Lungs Findings: Clear to auscultation     Heart/Vascular:  Heart Findings: Rate: Normal  Rhythm: Regular Rhythm        Mental  Status:  Mental Status Findings:  Cooperative, Alert and Oriented         Anesthesia Plan  Type of Anesthesia, risks & benefits discussed:  Anesthesia Type:  general, regional  Patient's Preference:   Intra-op Monitoring Plan: standard ASA monitors  Intra-op Monitoring Plan Comments:   Post Op Pain Control Plan: multimodal analgesia and per primary service following discharge from PACU  Post Op Pain Control Plan Comments:   Induction:   IV  Beta Blocker:         Informed Consent: Patient understands risks and agrees with Anesthesia plan.  Questions answered. Anesthesia consent signed with patient.  ASA Score: 2     Day of Surgery Review of History & Physical:  There are no significant changes.          Ready For Surgery From Anesthesia Perspective.

## 2018-08-28 ENCOUNTER — HOSPITAL ENCOUNTER (OUTPATIENT)
Dept: PREADMISSION TESTING | Facility: HOSPITAL | Age: 23
Discharge: HOME | End: 2018-08-28
Attending: ORTHOPAEDIC SURGERY
Payer: MEDICAID

## 2018-08-28 VITALS
HEIGHT: 67 IN | BODY MASS INDEX: 31.49 KG/M2 | OXYGEN SATURATION: 99 % | HEART RATE: 63 BPM | RESPIRATION RATE: 18 BRPM | WEIGHT: 200.63 LBS

## 2018-08-28 DIAGNOSIS — M25.561 ACUTE PAIN OF RIGHT KNEE: ICD-10-CM

## 2018-08-28 DIAGNOSIS — Z01.818 PRE-OP TESTING: Primary | ICD-10-CM

## 2018-08-28 DIAGNOSIS — Z01.818 PRE-OP EVALUATION: ICD-10-CM

## 2018-08-28 PROCEDURE — 97161 PT EVAL LOW COMPLEX 20 MIN: CPT

## 2018-08-28 PROCEDURE — G8979 MOBILITY GOAL STATUS: HCPCS | Mod: CH

## 2018-08-28 PROCEDURE — G8980 MOBILITY D/C STATUS: HCPCS | Mod: CH

## 2018-08-28 PROCEDURE — G8978 MOBILITY CURRENT STATUS: HCPCS | Mod: CH

## 2018-08-28 RX ORDER — ACETAMINOPHEN 500 MG
1000 TABLET ORAL
Status: CANCELLED | OUTPATIENT
Start: 2018-08-30 | End: 2018-08-30

## 2018-08-28 RX ORDER — CELECOXIB 100 MG/1
400 CAPSULE ORAL
Status: CANCELLED | OUTPATIENT
Start: 2018-08-30

## 2018-08-28 RX ORDER — CEFAZOLIN SODIUM 2 G/50ML
2 SOLUTION INTRAVENOUS ONCE
Status: CANCELLED | OUTPATIENT
Start: 2018-08-30

## 2018-08-28 NOTE — PLAN OF CARE
Had dizzy spells x 1 week in May       lab and EKG on that date . Denies dizzy spells since that time

## 2018-08-28 NOTE — DISCHARGE INSTRUCTIONS
Surgery for Anterior Cruciate Ligament (ACL) Injury  The ACL (anterior cruciate ligament) is a band of tough, fibrous tissue that helps stabilize the knee. Injury to this ligament often happens when the knee is forced beyond its normal range of motion. This can stretch or tear the ligament, much like the fibers of a rope coming apart. Both surgical and nonsurgical treatment has been used to recover from an ACL tear. Several types of surgery are available based on your and your healthcare provider's preferences, as well as other factors. Some surgeons will operate soon after an ACL tear. Others prefer several weeks of physical therapy first. There are also different anesthesia choices available.  Preparing for surgery  Do's and don'ts:  · Stop taking aspirin and other medicines 7 or more days before surgery as advised by your healthcare provider.  · Arrange to get crutches to use during recovery.  · Follow any directions you are given for not eating or drinking before surgery.  · Arrange for an adult to drive you home after surgery.   During surgery      The most common type of surgery for an ACL injury is reconstruction. Several types of surgeries are used:  · Patellar tendon graft. This uses a piece of your own patellar tendon between the knee cap and tibia.  · Quadriceps tendon graft. This uses a piece of your own quadriceps tendon between the quadriceps muscle and the knee cap.  · Hamstring tendon graft. This uses a piece of your own hamstring tendon between the hamstring muscle and the tibia.  · A cadaver (allograft) tendon graft. This uses any one of several tendons from a cadaver.  To rebuild your ACL, your healthcare provider may do open surgery or arthroscopy. During arthroscopy, a long, thin tube with a tiny camera is inserted into the knee joint so your healthcare provider can see inside the joint. Tools inserted through small incisions are used to repair the joint.  After surgery  Here is what to  expect:  · Youll spend a few hours in a recovery area. Youll have ice on your knee to prevent swelling, and your leg may be in a brace.  · You may get a continuous cooling machine to relieve swelling and pain.  · You may get medicines to reduce pain and swelling. Take these as prescribed by your healthcare provider.  · Depending on the procedure, physical therapy may begin shortly after surgery. This may include light exercises. In some cases, you may use a CPM (continuous passive motion) machine for a time. This machine flexes and extends the knee, keeping it from getting stiff.  · You can usually go home the same day as surgery. Have an adult family member or friend give you a ride.  Date Last Reviewed: 9/30/2015  © 6534-0203 [employer]. [address] [place] [address]. All rights reserved. This information is not intended as a substitute for professional medical care. Always follow your healthcare professional's instructions.      Your surgery is scheduled for 08/30/2018    Please report to Outpatient Surgery Intake Office on the 2nd FLOOR at 0800 a.m.          INSTRUCTIONS IMPORTANT!!!  ¨ Do not eat or drink after 12 midnight-including water. OK to brush teeth, no   gum, candy or mints!    ¨ Take only these medicines with a small swallow of water-morning of surgery.        ____  Proceed to [facility] on *** for additional blood test.        ____  Do not wear makeup, including mascara.  ____  No powder, lotions or creams to surgical area.  ____  Please remove all jewelry, including piercings and leave at home.  ____  No money or valuables needed. Please leave at home.  ____  Please bring any documents given by your doctor.  ____  If going home the same day, arrange for a ride home. You will not be able to             drive if Anesthesia was used.  ____  Children under 18 years require a parent / guardian present the entire time             they are in surgery /  recovery.  ____  Wear loose fitting clothing. Allow for dressings, bandages.  ____  Stop Aspirin, Ibuprofen, Motrin and Aleve at least 3-5 days before surgery, unless otherwise instructed by your doctor, or the nurse.   You MAY use Tylenol/acetaminophen until day of surgery.  ____  Wash the surgical area with Hibiclens the night before surgery, and again the             morning of surgery.  Be sure to rinse hibiclens off completely (if instructed by   nurse).  ____  If you take diabetic medication, do not take am of surgery unless instructed by Doctor.  ____  Call MD for temperature above 101 degrees.  ____ Stop taking any Fish Oil supplement or any Vitamins that contain Vitamin E at least 5 days prior to surgery.  ____ Do Not wear your contact lenses the day of your procedure.  You may wear your glasses.        I have read or had read and explained to me, and understand the above information.  Additional comments or instructions:  For additional questions call [phone]      Pre-Op Bathing Instructions    Before surgery, you can play an important role in your own health.    Because skin is not sterile, we need to be sure that your skin is as free of germs as possible. By following the instructions below, you can reduce the number of germs on your skin before surgery.    IMPORTANT: You will need to shower with a special soap called Hibiclens*, available at any pharmacy.  If you are allergic to Chlorhexidine (the antiseptic in Hibiclens), use an antibacterial soap such as Dial Soap for your preoperative shower.  You will shower with Hibiclens both the night before your surgery and the morning of your surgery.  Do not use Hibiclens on the head, face or genitals to avoid injury to those areas.    STEP #1: THE NIGHT BEFORE YOUR SURGERY     1. Do not shave the area of your body where your surgery will be performed.  2. Shower and wash your hair and body as usual with your normal soap and shampoo.  3. Rinse your hair and  body thoroughly after you shower to remove all soap residue.  4. With your hand, apply one packet of Hibiclens soap to the surgical site.   5. Wash the site gently for five (5) minutes. Do not scrub your skin too hard.   6. Do not wash with your regular soap after Hibiclens is used.  7. Rinse your body thoroughly.  8. Pat yourself dry with a clean, soft towel.  9. Do not use lotion, cream, or powder.  10. Wear clean clothes.    STEP #2: THE MORNING OF YOUR SURGERY     1. Repeat Step #1.    * Not to be used by people allergic to Chlorhexidine.

## 2018-08-28 NOTE — PT/OT/SLP PROGRESS
Physical Therapy Crutch Evaluation/Training  Discharge  Deepa Walls   MRN: [id number]   Admitting Diagnosis: <principal problem not specified>  The primary encounter diagnosis was Pre-op testing. A diagnosis of Pre-op evaluation was also pertinent to this visit.                          Billable Minutes:10:00 to 10:20  Evaluation 20     Order: crutch training  Order Date: 8/28/2018    Precautions Weight Bearing Status: non weight bearing: right leg    Patient Active Problem List   Diagnosis    Tobacco use    Acute pain of right knee     Past Medical History:   Diagnosis Date    Asthma     Ovarian cyst     left     History reviewed. No pertinent surgical history.    Subjective Information     Prior level of function: independent  Residence: lives with their family 1-story house/ trailer   Support available: family  Equipment owned: None  Mental Status: Oriented X 4  Skin: Intact  Sensation: Intact  Posture: Good  Hearing: Intact  Vision:  Intact    Pain at time of assessment: 0/10 located in right leg, aggravated by activitiy, relieved by rest  .    Objective findings/Assessment  Bed mobility: Mod I  Transfers: Mod I  Gross assessment: WFL  Standing balance: Good with AD  ROM: WFL  Strength: WFL  Patient requires crutch fitting and training.    Treatment  Gait: 80 ft with AD  Stairs: Teach back method  Transfers: na  Therapeutic Exercise: na  Education provided in form of: stair climbing    AM-PAC 6 CLICK MOBILITY  How much help from another person does this patient currently need?   1 = Unable, Total/Dependent Assistance  2 = A lot, Maximum/Moderate Assistance  3 = A little, Minimum/Contact Guard/Supervision  4 = None, Modified [place]/Independent          AM-PAC Raw Score CMS G-Code Modifier Level of Impairment Assistance   6 % Total / Unable   7 - 9 CM 80 - 100% Maximal Assist   10 - 14 CL 60 - 80% Moderate Assist   15 - 19 CK 40 - 60% Moderate Assist   20 - 22 CJ 20 - 40% Minimal  Assist   23 CI 1-20% SBA / CGA   24 CH 0% Independent/ Mod I     Goals/Discharge Status  Patient safely and effectively ambulates with crutches with  modified independent,  non weight bearing: right leg on level surfaces.    Recommended Plan:  Patient to be discharged to home with family support.    Romel Enriquez, PT  08/28/2018

## 2018-08-29 NOTE — H&P
Reason For Visit    Follow-up evaluation for right multi-ligament knee injury     History of Present Illness    Patient returns today for evaluation of her right knee. I previously evaluated the knee in March of this year. She was deemed to have laxity of her ACL, PCL, and MCL. MRI verified injury to these structures. She presents today with a pain level of 2 out of 10. She reports continued episodes of her knee giving way. She denies any locking. The pain is generalized to the right knee. She denies any current relieving factors.     Allergies   · No Known Environmental Allergies   · No Known Food Allergies    Current Meds    Medication Name Instruction   Naproxen 500 MG Oral Tablet      Active Problems   · Acute pain of both knees (M25.561,M25.562)   · Asthma, acute (J45.909)   · Complete tear of medial collateral ligament of right knee, initial encounter (S83.411A)   · Instability of right knee joint (M25.361)   · Internal derangement of right knee (M23.91)   · Rupture of anterior cruciate ligament of right knee, initial encounter (S83.511A)   · Rupture of posterior cruciate ligament of right knee, initial encounter (S83.521A)    Social History   · Current every day smoker (F17.200)   · No alcohol use    Review of Systems    see HPI  no dizziness     Results/Data  I reviewed prior diagnostic studies performed at [facility]. These studies include plain x-rays, CT scan, and MRI scan. In summary, there is evidence of prior medial collateral ligament rupture (tibia based) with significant bony contusion injury to the lateral femoral condyle and the lateral tibial plateau. On the sagittal images there is evidence of a complete rupture of the ACL and at least a high-grade partial injury to the PCL. The menisci look intact although the reading states that there could be a small horizontal tear in the posterior horn of the lateral meniscus. The menisci roots appear intact.        Vitals   Recorded: [id number] 02:41PM    Height 5 ft 7 in   Weight 194 lb    BMI Calculated 30.38   BSA Calculated 2   Systolic 125   Diastolic 75   Heart Rate 72   Pain Scale 2     Physical Exam  General: Alert female, no acute distress. She appears her stated age of 23 years old.    Skin: Intact over the right knee. No rash or cellulitis.    Lymphatics: No lymphedema in the right lower extremity.    Right knee: No effusion. Range of motion 5° hyperextension to 120° of flexion. Left knee range of motion is 10° of hyperextension to 150° of flexion. Ligamentous testing reveals the following: Grade 3 laxity of the MCL to valgus stress. Grade 2 posterior drawer. 2B Lachman's. Negative lateral laxity with varus stress. Negative pivot shift on today's exam.    Neurologic: Decreased light touch sensation over the anterior aspect of the proximal tibia. Light touch is otherwise intact in the distal distributions of the peroneal tibial nerves. Gross motor exams intact to the right lower extremity.    Vascular: There is a 2+ dorsalis pedis pulse in the right foot which is symmetric to the left.        Assessment   1. Complete tear of medial collateral ligament of right knee, initial encounter (S83.411A)   2. Rupture of anterior cruciate ligament of right knee, initial encounter (S83.511A)   3. Rupture of posterior cruciate ligament of right knee, initial encounter (S83.521A)    Plan    The patient wishes to proceed with surgery for her unstable knee. I have offered her right knee diagnostic arthroscopy, meniscal and chondral treatment as indicated, ACL reconstruction, and MCL reconstruction. The risk, benefits, and alternatives were discussed.     There are no interval changes to report in the history and physical exam.

## 2018-08-30 ENCOUNTER — HOSPITAL ENCOUNTER (OUTPATIENT)
Facility: HOSPITAL | Age: 23
Discharge: HOME | End: 2018-08-30
Attending: ORTHOPAEDIC SURGERY | Admitting: ORTHOPAEDIC SURGERY
Payer: MEDICAID

## 2018-08-30 ENCOUNTER — ANESTHESIA (OUTPATIENT)
Dept: SURGERY | Facility: HOSPITAL | Age: 23
End: 2018-08-30
Payer: MEDICAID

## 2018-08-30 DIAGNOSIS — M25.561 ACUTE PAIN OF RIGHT KNEE: ICD-10-CM

## 2018-08-30 DIAGNOSIS — S83.511A CHRONIC RUPTURE OF ACL OF RIGHT KNEE: Primary | ICD-10-CM

## 2018-08-30 DIAGNOSIS — S83.519A ACL (ANTERIOR CRUCIATE LIGAMENT) RUPTURE: ICD-10-CM

## 2018-08-30 PROCEDURE — 25000003 PHARM REV CODE 250: Performed by: STUDENT IN AN ORGANIZED HEALTH CARE EDUCATION/TRAINING PROGRAM

## 2018-08-30 PROCEDURE — C1713 ANCHOR/SCREW BN/BN,TIS/BN: HCPCS | Performed by: ORTHOPAEDIC SURGERY

## 2018-08-30 PROCEDURE — 37000008 HC ANESTHESIA 1ST 15 MINUTES: Performed by: ORTHOPAEDIC SURGERY

## 2018-08-30 PROCEDURE — 27800903 OPTIME MED/SURG SUP & DEVICES OTHER IMPLANTS: Performed by: ORTHOPAEDIC SURGERY

## 2018-08-30 PROCEDURE — C1769 GUIDE WIRE: HCPCS | Performed by: ORTHOPAEDIC SURGERY

## 2018-08-30 PROCEDURE — 01400 ANES OPN/ARTHRS KNEE JT NOS: CPT | Performed by: ORTHOPAEDIC SURGERY

## 2018-08-30 PROCEDURE — 25000003 PHARM REV CODE 250: Performed by: NURSE ANESTHETIST, CERTIFIED REGISTERED

## 2018-08-30 PROCEDURE — 27201423 OPTIME MED/SURG SUP & DEVICES STERILE SUPPLY: Performed by: ORTHOPAEDIC SURGERY

## 2018-08-30 PROCEDURE — 37000009 HC ANESTHESIA EA ADD 15 MINS: Performed by: ORTHOPAEDIC SURGERY

## 2018-08-30 PROCEDURE — 71000039 HC RECOVERY, EACH ADD'L HOUR: Performed by: ORTHOPAEDIC SURGERY

## 2018-08-30 PROCEDURE — 63600175 PHARM REV CODE 636 W HCPCS: Performed by: ORTHOPAEDIC SURGERY

## 2018-08-30 PROCEDURE — 71000033 HC RECOVERY, INTIAL HOUR: Performed by: ORTHOPAEDIC SURGERY

## 2018-08-30 PROCEDURE — 63600175 PHARM REV CODE 636 W HCPCS: Performed by: STUDENT IN AN ORGANIZED HEALTH CARE EDUCATION/TRAINING PROGRAM

## 2018-08-30 PROCEDURE — 71000015 HC POSTOP RECOV 1ST HR: Performed by: ORTHOPAEDIC SURGERY

## 2018-08-30 PROCEDURE — 63600175 PHARM REV CODE 636 W HCPCS: Performed by: NURSE ANESTHETIST, CERTIFIED REGISTERED

## 2018-08-30 PROCEDURE — 63600175 PHARM REV CODE 636 W HCPCS: Performed by: ANESTHESIOLOGY

## 2018-08-30 PROCEDURE — 36000710: Performed by: ORTHOPAEDIC SURGERY

## 2018-08-30 PROCEDURE — 25000003 PHARM REV CODE 250: Performed by: ORTHOPAEDIC SURGERY

## 2018-08-30 PROCEDURE — 36000711: Performed by: ORTHOPAEDIC SURGERY

## 2018-08-30 PROCEDURE — 64447 NJX AA&/STRD FEMORAL NRV IMG: CPT | Performed by: STUDENT IN AN ORGANIZED HEALTH CARE EDUCATION/TRAINING PROGRAM

## 2018-08-30 DEVICE — GUIDE FEMORAL BULLSEYE END: Type: IMPLANTABLE DEVICE | Site: KNEE | Status: FUNCTIONAL

## 2018-08-30 DEVICE — SCREW BONE MATRYX 8X25MM: Type: IMPLANTABLE DEVICE | Site: KNEE | Status: FUNCTIONAL

## 2018-08-30 DEVICE — PIN GUIDE GRAFT PASS XACTPIN: Type: IMPLANTABLE DEVICE | Site: KNEE | Status: FUNCTIONAL

## 2018-08-30 DEVICE — TENDON ACHILLES W/CALC 19.5CM: Type: IMPLANTABLE DEVICE | Site: KNEE | Status: FUNCTIONAL

## 2018-08-30 DEVICE — IMPLANTABLE DEVICE: Type: IMPLANTABLE DEVICE | Site: KNEE | Status: FUNCTIONAL

## 2018-08-30 DEVICE — BUTTON GRAFTMAX ALB: Type: IMPLANTABLE DEVICE | Site: KNEE | Status: FUNCTIONAL

## 2018-08-30 DEVICE — PIN GRAFTMAX INDICATOR: Type: IMPLANTABLE DEVICE | Site: KNEE | Status: FUNCTIONAL

## 2018-08-30 DEVICE — GUIDE GRAFTMAX XACTPIN FLEX: Type: IMPLANTABLE DEVICE | Site: KNEE | Status: FUNCTIONAL

## 2018-08-30 RX ORDER — NEOSTIGMINE METHYLSULFATE 1 MG/ML
INJECTION, SOLUTION INTRAVENOUS
Status: DISCONTINUED | OUTPATIENT
Start: 2018-08-30 | End: 2018-08-30

## 2018-08-30 RX ORDER — SODIUM CHLORIDE, SODIUM LACTATE, POTASSIUM CHLORIDE, CALCIUM CHLORIDE 600; 310; 30; 20 MG/100ML; MG/100ML; MG/100ML; MG/100ML
INJECTION, SOLUTION INTRAVENOUS CONTINUOUS
Status: DISCONTINUED | OUTPATIENT
Start: 2018-08-30 | End: 2018-08-30 | Stop reason: HOSPADM

## 2018-08-30 RX ORDER — DIPHENHYDRAMINE HYDROCHLORIDE 50 MG/ML
12.5 INJECTION INTRAMUSCULAR; INTRAVENOUS EVERY 6 HOURS PRN
Status: DISCONTINUED | OUTPATIENT
Start: 2018-08-30 | End: 2018-08-30 | Stop reason: HOSPADM

## 2018-08-30 RX ORDER — ROCURONIUM BROMIDE 10 MG/ML
INJECTION, SOLUTION INTRAVENOUS
Status: DISCONTINUED | OUTPATIENT
Start: 2018-08-30 | End: 2018-08-30

## 2018-08-30 RX ORDER — CELECOXIB 100 MG/1
400 CAPSULE ORAL
Status: DISCONTINUED | OUTPATIENT
Start: 2018-08-30 | End: 2018-08-30 | Stop reason: HOSPADM

## 2018-08-30 RX ORDER — ACETAMINOPHEN 500 MG
1000 TABLET ORAL
Status: COMPLETED | OUTPATIENT
Start: 2018-08-30 | End: 2018-08-30

## 2018-08-30 RX ORDER — LIDOCAINE HCL/PF 100 MG/5ML
SYRINGE (ML) INTRAVENOUS
Status: DISCONTINUED | OUTPATIENT
Start: 2018-08-30 | End: 2018-08-30

## 2018-08-30 RX ORDER — HYDROMORPHONE HYDROCHLORIDE 2 MG/ML
0.5 INJECTION, SOLUTION INTRAMUSCULAR; INTRAVENOUS; SUBCUTANEOUS EVERY 5 MIN PRN
Status: ACTIVE | OUTPATIENT
Start: 2018-08-30 | End: 2018-08-30

## 2018-08-30 RX ORDER — ESMOLOL HYDROCHLORIDE 10 MG/ML
INJECTION INTRAVENOUS
Status: DISCONTINUED | OUTPATIENT
Start: 2018-08-30 | End: 2018-08-30

## 2018-08-30 RX ORDER — OXYCODONE AND ACETAMINOPHEN 5; 325 MG/1; MG/1
1 TABLET ORAL EVERY 4 HOURS PRN
Status: DISCONTINUED | OUTPATIENT
Start: 2018-08-30 | End: 2018-08-30 | Stop reason: HOSPADM

## 2018-08-30 RX ORDER — PROPOFOL 10 MG/ML
VIAL (ML) INTRAVENOUS
Status: DISCONTINUED | OUTPATIENT
Start: 2018-08-30 | End: 2018-08-30

## 2018-08-30 RX ORDER — EPINEPHRINE 1 MG/ML
INJECTION, SOLUTION INTRACARDIAC; INTRAMUSCULAR; INTRAVENOUS; SUBCUTANEOUS
Status: DISCONTINUED | OUTPATIENT
Start: 2018-08-30 | End: 2018-08-30 | Stop reason: HOSPADM

## 2018-08-30 RX ORDER — GLYCOPYRROLATE 0.2 MG/ML
INJECTION INTRAMUSCULAR; INTRAVENOUS
Status: DISCONTINUED | OUTPATIENT
Start: 2018-08-30 | End: 2018-08-30

## 2018-08-30 RX ORDER — HYDROCODONE BITARTRATE AND ACETAMINOPHEN 5; 325 MG/1; MG/1
1 TABLET ORAL EVERY 4 HOURS PRN
Status: DISCONTINUED | OUTPATIENT
Start: 2018-08-30 | End: 2018-08-30 | Stop reason: HOSPADM

## 2018-08-30 RX ORDER — MIDAZOLAM HYDROCHLORIDE 1 MG/ML
INJECTION, SOLUTION INTRAMUSCULAR; INTRAVENOUS
Status: DISCONTINUED | OUTPATIENT
Start: 2018-08-30 | End: 2018-08-30

## 2018-08-30 RX ORDER — SODIUM CHLORIDE 0.9 % (FLUSH) 0.9 %
3 SYRINGE (ML) INJECTION
Status: DISCONTINUED | OUTPATIENT
Start: 2018-08-30 | End: 2018-08-30 | Stop reason: HOSPADM

## 2018-08-30 RX ORDER — ONDANSETRON 2 MG/ML
INJECTION INTRAMUSCULAR; INTRAVENOUS
Status: DISCONTINUED | OUTPATIENT
Start: 2018-08-30 | End: 2018-08-30

## 2018-08-30 RX ORDER — ONDANSETRON 2 MG/ML
4 INJECTION INTRAMUSCULAR; INTRAVENOUS DAILY PRN
Status: DISCONTINUED | OUTPATIENT
Start: 2018-08-30 | End: 2018-08-30 | Stop reason: HOSPADM

## 2018-08-30 RX ORDER — ONDANSETRON 4 MG/1
8 TABLET, ORALLY DISINTEGRATING ORAL EVERY 8 HOURS PRN
Qty: 10 TABLET | Refills: 0 | Status: SHIPPED | OUTPATIENT
Start: 2018-08-30 | End: 2018-09-14 | Stop reason: ALTCHOICE

## 2018-08-30 RX ORDER — ONDANSETRON 2 MG/ML
4 INJECTION INTRAMUSCULAR; INTRAVENOUS DAILY PRN
Status: DISCONTINUED | OUTPATIENT
Start: 2018-08-30 | End: 2018-08-30 | Stop reason: SDUPTHER

## 2018-08-30 RX ORDER — FENTANYL CITRATE 50 UG/ML
INJECTION, SOLUTION INTRAMUSCULAR; INTRAVENOUS
Status: DISCONTINUED | OUTPATIENT
Start: 2018-08-30 | End: 2018-08-30

## 2018-08-30 RX ORDER — ONDANSETRON 2 MG/ML
4 INJECTION INTRAMUSCULAR; INTRAVENOUS EVERY 12 HOURS PRN
Status: DISCONTINUED | OUTPATIENT
Start: 2018-08-30 | End: 2018-08-30 | Stop reason: HOSPADM

## 2018-08-30 RX ORDER — CEPHALEXIN 500 MG/1
500 CAPSULE ORAL EVERY 8 HOURS
Qty: 2 CAPSULE | Refills: 0 | Status: SHIPPED | OUTPATIENT
Start: 2018-08-30 | End: 2018-09-14 | Stop reason: ALTCHOICE

## 2018-08-30 RX ORDER — HYDROCODONE BITARTRATE AND ACETAMINOPHEN 10; 325 MG/1; MG/1
1 TABLET ORAL EVERY 4 HOURS PRN
Status: DISCONTINUED | OUTPATIENT
Start: 2018-08-30 | End: 2018-08-30 | Stop reason: HOSPADM

## 2018-08-30 RX ORDER — HYDROMORPHONE HYDROCHLORIDE 2 MG/ML
0.5 INJECTION, SOLUTION INTRAMUSCULAR; INTRAVENOUS; SUBCUTANEOUS EVERY 5 MIN PRN
Status: DISPENSED | OUTPATIENT
Start: 2018-08-30 | End: 2018-08-30

## 2018-08-30 RX ORDER — OXYCODONE AND ACETAMINOPHEN 5; 325 MG/1; MG/1
1 TABLET ORAL EVERY 6 HOURS PRN
Qty: 28 TABLET | Refills: 0 | Status: SHIPPED | OUTPATIENT
Start: 2018-08-30 | End: 2018-09-14 | Stop reason: ALTCHOICE

## 2018-08-30 RX ORDER — ROPIVACAINE HYDROCHLORIDE 5 MG/ML
INJECTION, SOLUTION EPIDURAL; INFILTRATION; PERINEURAL
Status: COMPLETED | OUTPATIENT
Start: 2018-08-30 | End: 2018-08-30

## 2018-08-30 RX ORDER — LIDOCAINE HYDROCHLORIDE 10 MG/ML
1 INJECTION, SOLUTION EPIDURAL; INFILTRATION; INTRACAUDAL; PERINEURAL ONCE
Status: DISCONTINUED | OUTPATIENT
Start: 2018-08-30 | End: 2018-08-30 | Stop reason: HOSPADM

## 2018-08-30 RX ORDER — CEFAZOLIN SODIUM 2 G/50ML
2 SOLUTION INTRAVENOUS ONCE
Status: COMPLETED | OUTPATIENT
Start: 2018-08-30 | End: 2018-08-30

## 2018-08-30 RX ORDER — SUCCINYLCHOLINE CHLORIDE 20 MG/ML
INJECTION INTRAMUSCULAR; INTRAVENOUS
Status: DISCONTINUED | OUTPATIENT
Start: 2018-08-30 | End: 2018-08-30

## 2018-08-30 RX ADMIN — ROCURONIUM BROMIDE 5 MG: 10 INJECTION, SOLUTION INTRAVENOUS at 10:08

## 2018-08-30 RX ADMIN — ROPIVACAINE HYDROCHLORIDE 10 ML: 5 INJECTION, SOLUTION EPIDURAL; INFILTRATION; PERINEURAL at 09:08

## 2018-08-30 RX ADMIN — ACETAMINOPHEN 1000 MG: 500 TABLET ORAL at 08:08

## 2018-08-30 RX ADMIN — CELECOXIB 400 MG: 100 CAPSULE ORAL at 08:08

## 2018-08-30 RX ADMIN — PROPOFOL 200 MG: 10 INJECTION, EMULSION INTRAVENOUS at 10:08

## 2018-08-30 RX ADMIN — ESMOLOL HYDROCHLORIDE 50 MG: 10 INJECTION, SOLUTION INTRAVENOUS at 10:08

## 2018-08-30 RX ADMIN — MIDAZOLAM 5 MG: 1 INJECTION INTRAMUSCULAR; INTRAVENOUS at 09:08

## 2018-08-30 RX ADMIN — ONDANSETRON 8 MG: 2 INJECTION, SOLUTION INTRAMUSCULAR; INTRAVENOUS at 01:08

## 2018-08-30 RX ADMIN — HYDROMORPHONE HYDROCHLORIDE 0.5 MG: 2 INJECTION INTRAMUSCULAR; INTRAVENOUS; SUBCUTANEOUS at 02:08

## 2018-08-30 RX ADMIN — NEOSTIGMINE METHYLSULFATE 4 MG: 1 INJECTION INTRAVENOUS at 01:08

## 2018-08-30 RX ADMIN — SODIUM CHLORIDE, SODIUM LACTATE, POTASSIUM CHLORIDE, AND CALCIUM CHLORIDE: .6; .31; .03; .02 INJECTION, SOLUTION INTRAVENOUS at 09:08

## 2018-08-30 RX ADMIN — GLYCOPYRROLATE 0.6 MG: 0.2 INJECTION, SOLUTION INTRAMUSCULAR; INTRAVENOUS at 01:08

## 2018-08-30 RX ADMIN — LIDOCAINE HYDROCHLORIDE 80 MG: 20 INJECTION, SOLUTION INTRAVENOUS at 10:08

## 2018-08-30 RX ADMIN — SODIUM CHLORIDE, SODIUM LACTATE, POTASSIUM CHLORIDE, AND CALCIUM CHLORIDE 10 ML/HR: .6; .31; .03; .02 INJECTION, SOLUTION INTRAVENOUS at 08:08

## 2018-08-30 RX ADMIN — FENTANYL CITRATE 50 MCG: 50 INJECTION, SOLUTION INTRAMUSCULAR; INTRAVENOUS at 10:08

## 2018-08-30 RX ADMIN — ROCURONIUM BROMIDE 25 MG: 10 INJECTION, SOLUTION INTRAVENOUS at 10:08

## 2018-08-30 RX ADMIN — SUCCINYLCHOLINE CHLORIDE 120 MG: 20 INJECTION, SOLUTION INTRAMUSCULAR; INTRAVENOUS at 10:08

## 2018-08-30 RX ADMIN — FENTANYL CITRATE 50 MCG: 50 INJECTION, SOLUTION INTRAMUSCULAR; INTRAVENOUS at 01:08

## 2018-08-30 RX ADMIN — MIDAZOLAM 2 MG: 1 INJECTION INTRAMUSCULAR; INTRAVENOUS at 09:08

## 2018-08-30 RX ADMIN — CEFAZOLIN SODIUM 2 G: 2 SOLUTION INTRAVENOUS at 10:08

## 2018-08-30 RX ADMIN — OXYCODONE HYDROCHLORIDE AND ACETAMINOPHEN 1 TABLET: 5; 325 TABLET ORAL at 03:08

## 2018-08-30 NOTE — TRANSFER OF CARE
"Anesthesia Transfer of Care Note    Patient: Deepa Walls    Procedure(s) Performed: Procedure(s) (LRB):  RECONSTRUCTION, KNEE, ACL, HAMSTRING AUTOGRAFT (Right)  RECONSTRUCTION, LIGAMENT, MCL, USING  ACHILLES TENDON ALLOGRAFT (Right)    Patient location: PACU    Anesthesia Type: general    Transport from OR: Transported from OR on 6-10 L/min O2 by face mask with adequate spontaneous ventilation    Post pain: adequate analgesia    Post assessment: no apparent anesthetic complications    Post vital signs: stable    Level of consciousness: awake and alert    Nausea/Vomiting: no nausea/vomiting    Complications: none    Transfer of care protocol was followed      Last vitals:   Visit Vitals  /60   Pulse 77   Temp 36.3 °C (97.3 °F) (Skin)   Resp 18   Ht 5' 7" (1.702 m)   Wt 90.7 kg (200 lb)   LMP 08/05/2018 (Exact Date)   SpO2 100%   BMI 31.32 kg/m²     "

## 2018-08-30 NOTE — ANESTHESIA PROCEDURE NOTES
Peripheral    Patient location during procedure: pre-op   Block not for primary anesthetic.  Reason for block: at surgeon's request and post-op pain management   Post-op Pain Location: right knee  Start time: 8/30/2018 9:18 AM  Timeout: 8/30/2018 9:17 AM   End time: 8/30/2018 9:20 AM  Staffing  Anesthesiologist: Agustin Islas MD  Resident/CRNA: Rin Petty MD  Performed: resident/CRNA   Preanesthetic Checklist  Completed: patient identified, site marked, surgical consent, pre-op evaluation, timeout performed, IV checked, risks and benefits discussed and monitors and equipment checked  Peripheral Block  Patient position: supine  Prep: ChloraPrep  Patient monitoring: heart rate, cardiac monitor, continuous pulse ox, continuous capnometry and frequent blood pressure checks  Block type: adductor canal  Laterality: right  Injection technique: single shot  Needle  Needle type: Stimuplex   Needle gauge: 21 G  Needle length: 4 in  Needle localization: anatomical landmarks and ultrasound guidance   -ultrasound image captured on disc.  Assessment  Injection assessment: negative aspiration, negative parasthesia and local visualized surrounding nerve  Paresthesia pain: none  Heart rate change: no  Slow fractionated injection: yes  Additional Notes  VSS.  [facility] RN monitoring vitals throughout procedure.  Patient tolerated procedure well.

## 2018-08-30 NOTE — ANESTHESIA POSTPROCEDURE EVALUATION
"Anesthesia Post Evaluation    Patient: Deepa Walls    Procedure(s) Performed: Procedure(s) (LRB):  RECONSTRUCTION, KNEE, ACL, HAMSTRING AUTOGRAFT (Right)  RECONSTRUCTION, LIGAMENT, MCL, USING  ACHILLES TENDON ALLOGRAFT (Right)    Final Anesthesia Type: general  Patient location during evaluation: PACU  Patient participation: Yes- Able to Participate  Level of consciousness: awake and alert, oriented and awake  Post-procedure vital signs: reviewed and stable  Pain management: adequate  Airway patency: patent  PONV status at discharge: No PONV  Anesthetic complications: no      Cardiovascular status: blood pressure returned to baseline  Respiratory status: unassisted and room air  Hydration status: euvolemic  Follow-up not needed.        Visit Vitals  /74 (BP Location: Left arm, Patient Position: Lying)   Pulse 74   Temp 36.7 °C (98 °F) (Skin)   Resp 18   Ht 5' 7" (1.702 m)   Wt 90.7 kg (200 lb)   LMP 08/05/2018 (Exact Date)   SpO2 98%   BMI 31.32 kg/m²       Pain/Noreen Score: Pain Assessment Performed: Yes (8/30/2018  3:55 PM)  Presence of Pain: denies (8/30/2018  3:55 PM)  Pain Rating Prior to Med Admin: 7 (8/30/2018  3:00 PM)  Pain Rating Post Med Admin: 0 (8/30/2018  3:10 PM)  Noreen Score: 10 (8/30/2018  3:55 PM)        "

## 2018-08-30 NOTE — PLAN OF CARE
Received crutch training preop by physical therapy. Received crutches at that time. States she is comfortable using them at home, voiced no concerns.

## 2018-08-30 NOTE — DISCHARGE INSTRUCTIONS
Discharge Instructions for Knee Arthroscopy    You had knee arthroscopy. This surgical procedure uses small incisions to locate, identify, and treat problems inside the knee. These problems include loose bodies, meniscal tears, bone spurs, osteochondritis dissecans (OCD), and synovitis. Below are tips to help speed your recovery from surgery.    Activity  · Dont drive until your doctor says its OK. And never drive while taking opioid pain medicine.  · Remember to take pain medicines as directed; dont wait for the pain to get bad. And don't drink alcohol while taking pain medicines.  · Unless your doctor tells you otherwise, begin using the affected knee as much as you can tolerate 3 days after surgery.  · Slowly bend and straighten your affected leg as far as you can, unless your doctor tells you otherwise. Do this several times a day.  · Rest your knee by lying down and putting pillows under it for the first 3 days after surgery. Keep your ankle elevated above the level of your heart. This helps keep swelling down.  · Follow your doctors instructions about wearing and caring for a brace, immobilizer, or elastic dressing.  · Point and flex your foot, and rotate your ankle as much as possible during the first few weeks following surgery. Also, wiggle your toes as much as possible.  ·   Incision care  · Check your incision daily for redness, tenderness, or drainage.  · Dont be alarmed if there is some bruising, slight swelling of the knee, or a small amount of blood on the bandage.  · Adjust the bandage or brace as needed. It should feel supportive on your knee, but not too tight.   · Dont soak your incision in water (no hot tubs, bathtubs, swimming pools) until your doctor says its OK.  · Wait 2 day(s) after your surgery to begin showering. Then shower as needed. Cover your knee with plastic to keep the dressing or brace dry. Once your dressing is removed, follow your doctors instructions for care of the  wound. And sit on a shower stool so that you dont fall while showering.  · Use an ice pack or bag of frozen peas--or something similar--wrapped in a thin towel to reduce the swelling. Keep the foot elevated while you ice the knee. Apply the ice pack for 20 minutes; then remove it for 20 minutes. Repeat as needed. Icing helps reduce swelling.  ·   Other precautions  · Arrange your household to keep the items you need within reach.  · Remove throw rugs, electrical cords, and anything else that may cause you to fall.  · Use nonslip bath mats, grab bars, an elevated toilet seat, and a shower chair in your bathroom.  · Use a cane, crutches, a walker, or handrails until your balance, flexibility, and strength improve, and you can put weight on your leg. And remember to ask for help from others when you need it.  · Free up your hands so that you can use them to keep balance. Use a nicola pack, apron, or pockets to carry things.  Follow-up  Make a follow-up appointment as directed by your doctor.     When to seek medical attention  Call 911 right away if you have any of the following:  · Chest pain  · Shortness of breath  · Severe nausea  Otherwise, call your doctor immediately if you have any of the following:  · Pain that is not relieved by medicine or rest  · Continued bleeding through the bandage  · Tingling, numbness, or coldness in your foot or leg  · Fever above 100.4°F (38.0°C) or shaking chills  · Excessive swelling, increased redness, or any drainage around the incision  · Swelling, tenderness, or pain in your leg         Keep dressing on until follow up visit.  Leave hinged knee brace locked in extension until follow up in 2 weeks.  Toe touch weight bearing in with crutches or walker.  Keep incisions/dressing dry until follow up.      ANESTHESIA  -For the first 24 hours after surgery:  Do not drive, use heavy equipment, make important decisions, or drink alcohol  -It is normal to feel sleepy for several hours.   Rest until you are more awake.  -Have someone stay with you, if needed.  They can watch for problems and help keep you safe.  -Some possible post anesthesia side effects include: nausea and vomiting, sore throat and hoarseness, sleepiness, and dizziness.    PAIN  -If you have pain after surgery, pain medicine will help you feel better.  Take it as directed, before pain becomes severe.  Most pain relievers taken by mouth need at least 20-30 minutes to start working.  -Do not drive or drink alcohol while taking pain medicine.  -Pain medication can upset your stomach.  Taking them with a little food may help.  -Other ways to help control pain: elevation, ice, and relaxation  -Call your surgeon if still having unmanageable pain an hour after taking pain medicine.  -Pain medicine can cause constipation.  Taking an over-the counter stool softener while on prescription pain medicine and drinking plenty of fluids can prevent this side effect.  -Call your surgeon if you have severe side effects like: breathing problems, trouble waking up, dizziness, confusion, or severe constipation.    NAUSEA  -Some people have nausea after surgery.  This is often because of anesthesia, pain, pain medicine, or the stress of surgery.  -Do not push yourself to eat.  Start off with clear liquids and soup.  Slowly move to solid foods.  Don't eat fatty, rich, spicy foods at first.  Eat smaller amounts.  -If you develop persistent nausea and vomiting please notify your surgeon immediately.    BLEEDING  -Different types of surgery require different types of care and dressing changes.  It is important to follow all instructions and advice from your surgeon.  Change dressing as directed.  Call your surgeon for any concerns regarding postop bleeding.    SIGNS OF INFECTION  -Signs of infection include: fever, swelling, drainage, and redness  -Notify your surgeon if you have a fever of 100.4 F (38.0 C) or higher.  -Notify your surgeon if you notice  redness, swelling, increased pain, pus, or a foul smell at the incision site.      Notify Dr. Tran for any problems or concerns.

## 2018-08-30 NOTE — PLAN OF CARE
Patient sleeping, arousable. VSS. States pain is controlled at this time. Dr. Alison Singh to release patient from PACU. Report to Vanesa Tolbert LPN with time for questions, verbalized understanding. Patient discharged to OPS.

## 2018-08-30 NOTE — BRIEF OP NOTE
[facility] Brief Operative Note      Patient information:  Deepa Walls   [id number]     Date of Surgery:  8/30/2018     Pre-op Diagnosis:   1. Right ACL complete tear  2. Right PCL partial tear  3. Right MCL complete tear       Post-op Diagnosis:   1. Right ACL complete tear  2. Right PCL partial tear  3. Right MCL complete tear    Procedure(s):   1. Right ACL reconstruction (achilles allograft)  2. Right MCL reconstruction  3. Right knee diagnostic arthroscopy    Anesthesia: General    Staff Attending/Surgeon: Phill Tran MD, present and scrubbed during all critical portions of the case.    Assistant Surgeon(s):   1. Jovany Benito MD  2. Benigno Parrish MD    Estimated Blood Loss: less than 50 mL           Drain: None             Specimens: None    Implants: See op report    Complications: None    Findings: Consistent with diagnosis           Disposition: awakened from anesthesia, extubated and taken to the recovery room in a stable condition, having suffered no apparent untoward event.           Condition: doing well without problems      Technique: See op report

## 2018-08-30 NOTE — PLAN OF CARE
Criteria met for discharge, IV removed, instructions explained,copy given. Denies pain, tolerating po well, voiding without difficulty. Pt and family all verbalize understanding of instructions, have no further questions. Able to use crutches without difficulty, gait steady.Discharged home with family in good condition.

## 2018-08-31 VITALS
TEMPERATURE: 98 F | WEIGHT: 200 LBS | HEART RATE: 74 BPM | RESPIRATION RATE: 18 BRPM | OXYGEN SATURATION: 98 % | HEIGHT: 67 IN | SYSTOLIC BLOOD PRESSURE: 137 MMHG | BODY MASS INDEX: 31.39 KG/M2 | DIASTOLIC BLOOD PRESSURE: 74 MMHG

## 2018-08-31 NOTE — OP NOTE
DATE OF PROCEDURE:  08/30/2018.    PREOPERATIVE DIAGNOSIS:  Right multi-ligament knee injury including injury to   the ACL, PCL, and the MCL.    INDICATION:  To improve knee stability.    POSTOPERATIVE DIAGNOSES:  1.  Right complete ACL rupture.  2.  Right complete MCL rupture.  3.  Right partial PCL rupture.    PROCEDURES PERFORMED:  1.  Right arthroscopic-assisted ACL reconstruction with Achilles tendon   allograft.  2.  Right MCL reconstruction with Achilles tendon allograft.    NARRATIVE:  The patient was first correctly identified in the preoperative   holding area.  Written informed consent was obtained.  The patient is a   23-year-old patient who previously ruptured the ACL, part of her PCL and her   MCL.  She is now approximately nine months out from her injury and reports   instability of the knee.  After the risks, benefits and alternatives were   discussed in detail, she gave written informed consent to proceed with the   surgery.  Once the patient was brought into the Operating Room, she was placed   supine on the operating room table.  General anesthesia was administered.  Once   the patient was asleep, an exam under anesthesia was performed.  Range of motion   testing revealed that she hyperextended 10 degrees on both sides.  She flexed   to 135 bilaterally.  On ligamentous testing, she had a 2+ Lachman's, a grade II   posterior drawer and a grade III valgus stress test.  The lateral side of the   knee was intact.  I obtained stress radiographic views that verified that she   opened up medially in full extension compared to her opposite side.  Next, a   tourniquet was placed on to the right thigh.  The right knee was prepped and   draped in the usual sterile fashion.  A surgical timeout was performed to verify   the correct extremity as well as preoperative administration of IV antibiotics   within 1 hour of surgical start time.  A standard anterolateral portal was first   made.  The arthroscope was  introduced into the patellofemoral compartment.    There was some minor fraying in the patellar cartilage.  The trochlear groove   was found to be intact.  The scope was placed down the gutters where no loose   bodies were seen.  The scope was then placed into the medial compartment of the   knee.  Using an outside-in technique, an anterior medial portal was made.  On   assessment of the medial compartment, there was a positive drive-through sign.    The medial meniscus was probed and found to be stable.  The articular cartilage   on the medial side of the knee was intact.  The scope was then placed into the   intercondylar notch.  There was empty wall sign on the lateral femoral condyle   consistent with a complete rupture of the ACL.  The PCL demonstrated some   attachments still present on its origin.  It looked like there was a partial   injury to the PCL.  This tissue to probing felt stable.  The scope was then   placed into the lateral compartment of the knee.  I probed the lateral meniscus   and felt that it was stable.  I looked at the undersurface of the lateral   meniscus, posterior horn and it looked like there maybe a previous injury that   had scarred in.  I left this alone.  The articular cartilage in the lateral   compartment was intact.  Next, I proceeded to debride out the remnant of the   ACL.  While I did this, an assistant on the back table prepared two Achilles   tendon allografts.  The first allograft was a 10 x 20 bone plug.  The other   allograft was approximately a 9 x 25 mm bone plug.  Whipstitches were placed on   each end of the grafts.  After clearing out the tissue in the intercondylar   notch, I then proceeded to use a 7 mm over the top guide through my anteromedial   portal to place a Beath pin through the ACL origin.  The Beath pin was then   passed out of the anterolateral thigh.  Over the Beath pin, we reamed a 10 mm x   0.5 mm socket.  The back wall was found to be intact.  A  passing suture was then   passed through the femoral tunnel.  The ACL guide was then set at 55 degrees   and placed in the middle of the ACL footprint on the tibia.  We drilled a   guidepin and then drilled a 8 mm socket on the tibial side.  Next, the passing   suture was retrieved through the tibial tunnel.  Next, I turned my attention to   the MCL.  I extended my anteromedial incision down to the proximal medial tibia   and proximally to the level of the patella.  A full-thickness fasciocutaneous   flap was developed so I could palpate the medial collateral ligament and the   medial epicondyle.  First, I started on the medial side and identified the MCL   origin.  This is proximal and posterior to the medial epicondyle.  A Beath pin   was placed into the origin and passed out the medial side.  I then passed a   suture around this guide pin and estimated the isometric point of the   superficial MCL on the tibia.  This was found to be well localized just   posterior to the pes tendons about 6 cm from the joint line.  Next, I proceeded   to drill a 10 mm x 0.5 mm socket at the MCL origin.  For the MCL insertion, we   drilled for a 4.5 bicortical screw at the isometric point.  Next, we proceeded   to pass our Achilles allograft for our ACL.  The graft was passed through the   tibial and then femoral tunnel.  The graft was then secured on the femoral side   with a 7 x 25 mm biointerference screw.  The knee was then taken into full   extension.  There was no impingement of the graft.  I then cycled the graft 15   times with tension applied.  Next, another Achilles tendon allograft was   utilized for the MCL.  The bone plug at end of the graft was placed into our   socket near the medial epicondyle at the MCL origin.  We then secured this with   a 9 x 25 mm screw.  Our first screw was 7 and the fixation was so good that it   broke the screw.  We upsized to an 8 and did not get a good bite and then we had   to use a 9 to  get a good bite on the bone block in the MCL femoral tunnel.    Next, the graft was tunneled beneath the pes tendons and a slit was made in the   distal end of the graft to accept a screw and spiked ligament washer.  Prior to   seating the graft on the tibial side, a Y-Knot anchor was placed 1 cm distal to   the medial joint line.  These sutures were then passed through the graft at the   insertion site of the MCL.  Next, we proceeded to place the knee in full   extension and secured the Achilles tendon allograft on the tibial side with an 8   x 25 mm biointerference screw.  This screw did not give good purchase.  I   palpated our graft and felt that the laxity was not good.  We took this screw   out.  We ended up having an upsized in a 11 x 25 mm screw.  I then put the scope   back into the joint and then the graft had excellent tension.  Next, we   proceeded to put the knee in about 20 to 30 degrees of flexion with a slight   varus stress and external rotation and secured the MCL on the tibial spine side   by using a bicortical screw and spiked ligament washer.  After doing this, we   then secured the deep portion of the MCL by tying the horizontal mattress   sutures that were from the Y-Knot anchor.  There was excellent fixation of the   medial side after we completed the repair.  We had extra Achilles tendon   allograft distally.  We folded this over our screw and sutured it to the local   tissue.  I felt that backing up the ACL was also warranted.  I backed up the ACL   graft with a screw and post-fixation just distal to tibial tunnel.  In the end,   she had excellent range of motion.  Her knee felt stable for Lachman's testing   as well as to valgus stress.  The wound was copiously irrigated with saline.    The subcutaneous skin layer was closed with interrupted 2-0 Vicryl.  The skin   was closed with a running 3-0 Monocryl and Steri-Strips.  The portal incisions   were closed with 3-0 nylon suture.  A sterile  dressing was applied.  A hinged   knee brace locked in full extension was applied.  She was awakened and   transferred to the Postanesthesia Care Unit in stable condition.    ESTIMATED BLOOD LOSS:  100 mL.    COMPLICATIONS:  None known.    DRAINS:  None.      LAURO/IN  dd: 08/31/2018 10:46:42 (CDT)  td: 08/31/2018 14:48:53 (CDT)  Doc ID   #[id number]  Job ID #[id number]    CC:

## 2018-08-31 NOTE — DISCHARGE SUMMARY
Physician Discharge Summary     Patient ID:  [id number]    Admit date: 8/30/2018    Discharge date: same    Admitting Physician: Phill Tran MD    Discharge Physician: same    Admission Diagnoses:   1. Right ACL complete tear  2. Right PCL partial tear  3. Right MCL complete tear    Discharge Diagnoses: same    Admission Condition: good    Discharged Condition: good    Indication for Admission: operative management of above conditions    Hospital Course:   Deepa Walls was admitted to [facility] by Dr. Tran and taken to the OR for a right knee ACL and MCL reconstruction.  See op note for details.  she tolerated the procedure well and post op course was benign.  Patient was discharged with proper instructions.    Consults: None    Treatments: surgery    Disposition: Home or Self Care    Patient Instructions:     Discharge Medications  Refer to Discharge Medication List    Activity: NWB with R knee in HKB locked in extension.  Diet: regular diet  Wound Care: keep wound clean and dry    Discussed plan with patient and answered questions: Yes    Signed:  Jovany Benito MD

## 2018-09-06 DIAGNOSIS — S83.511A RUPTURE OF ANTERIOR CRUCIATE LIGAMENT OF RIGHT KNEE, INITIAL ENCOUNTER: ICD-10-CM

## 2018-09-06 DIAGNOSIS — S83.411A COMPLETE TEAR OF MEDIAL COLLATERAL LIGAMENT OF RIGHT KNEE, INITIAL ENCOUNTER: Primary | ICD-10-CM

## 2018-09-10 ENCOUNTER — CLINICAL SUPPORT (OUTPATIENT)
Dept: REHABILITATION | Facility: HOSPITAL | Age: 23
End: 2018-09-10
Attending: ORTHOPAEDIC SURGERY
Payer: MEDICAID

## 2018-09-10 DIAGNOSIS — R53.1 DECREASED STRENGTH: ICD-10-CM

## 2018-09-10 DIAGNOSIS — R26.89 DECREASED FUNCTIONAL MOBILITY: ICD-10-CM

## 2018-09-10 DIAGNOSIS — M25.669 DECREASED RANGE OF MOTION (ROM) OF KNEE: ICD-10-CM

## 2018-09-10 DIAGNOSIS — M25.561 RIGHT KNEE PAIN, UNSPECIFIED CHRONICITY: ICD-10-CM

## 2018-09-10 PROCEDURE — 97162 PT EVAL MOD COMPLEX 30 MIN: CPT | Mod: PN

## 2018-09-10 NOTE — PLAN OF CARE
"[facility]  Physical Therapy Initial Evaluation    Name: Deepa Walls  Clinic Number: [id number]    Therapy Diagnosis:   Encounter Diagnoses   Name Primary?    Right knee pain, unspecified chronicity     Decreased range of motion (ROM) of knee     Decreased strength     Decreased functional mobility      Physician: Phill Tran MD    Physician Orders: PT Eval and Treat   Medical Diagnosis from Referral: Rupture of anterior cruciate ligament of right knee  Evaluation Date: 9/10/2018  Authorization Period Expiration: 12/31/2018  Plan of Care Expiration: 9/10/18 - 12/10/18  Visit # / Visits authorized: 1/ 1    Time In: 1410  Time Out: 1440  Total Billable Time: 30 minutes (MCE-1)    Precautions: Standard and Asthma    Subjective     Date of onset: DOS: 8/30/18    History of current condition - Deepa reports: she tore her ACL in December of 2017. She was unable to have surgery immediately, but was able to ambulate independently. States she "always felt like my knee wasn't okay"       Past Medical History:   Diagnosis Date    Asthma     Ovarian cyst     left     Deepa Walls  has a past surgical history that includes RECONSTRUCTION, KNEE, ACL, HAMSTRING AUTOGRAFT (Right, 8/30/2018) and RECONSTRUCTION, LIGAMENT, MCL, USING  ACHILLES TENDON ALLOGRAFT (Right, 8/30/2018).    Deepa has a current medication list which includes the following prescription(s): cephalexin, ondansetron, and oxycodone-acetaminophen.    Review of patient's allergies indicates:  No Known Allergies     Imaging, none:     Prior Therapy: Yes  Social History: lives with their spouse, however he is gone Mon-Fri working  Occupation: currently unemployed  Prior Level of Function: Independent  Current Level of Function: Mod I with 2 axillary crutches    Pain:  Current 0/10, worst 7/10, best 0/10   Location: right knee   Description: Aching  Aggravating Factors: Standing, Walking, Morning and " "cold  Easing Factors: massage, ice, rest and elevation    Pts goals: decrease pain, "feel better and walk soon"    Objective       Palpation: No significant TTP    AROM: *denotes pain  R: 10*-68*    PROM:   R: NT    L/E Strength w/ MicroFET Muscle [place]hlle Dynamometer Right Left Pain/Dysfunction with Movement   (approx 4 sec hold w/ max contraction)   Hip Flexion NT kg  12.7 kg     Hip Abduction 9.7 kg  11.7 kg     Quadriceps NT kg  NT kg     Hamstrings NT kg  NT kg               CMS Impairment/Limitation/Restriction for FOTO Knee Survey    Therapist reviewed FOTO scores for Deepa Walls on 9/10/2018.   FOTO documents entered into [facility] - see Media section.    Limitation Score: 74%         TREATMENT   Treatment Time In:   Treatment Time Out:   Total Treatment time separate from Evaluation:  minutes    Home Exercises and Patient Education Provided    Education provided:   - role of therapy    Written Home Exercises Provided: yes.  Exercises were reviewed and Deepa was able to demonstrate them prior to the end of the session.  Deepa demonstrated good  understanding of the education provided.     See EMR under Patient Instructions for exercises provided 9/10/2018.    Assessment   Deepa is a 23 y.o. female referred to outpatient Physical Therapy with a medical diagnosis of Rupture of anterior cruciate ligament of right knee. Pt presents to PT with pain, decreased cervical/lumbar ROM, decreased right knee ROM, decreased strength, poor posture, impaired balance and gait, and functional deficits with walking. Pt would benefit from skilled PT consisting of Treatment to consist of manual therapy including STM/MFR right  knee, patellar mobs, knee flex/ext mobs/stretching; therapeutic exercise including LE strengthening/stretching, postural education, balance and gait training, and modalities prn to address limitations and increase functional mobility.      Pt prognosis is Good.   Pt will benefit from skilled " outpatient Physical Therapy to address the deficits stated above and in the chart below, provide pt/family education, and to maximize pt's level of independence.     Plan of care discussed with patient: Yes  Pt's spiritual, cultural and educational needs considered and patient is agreeable to the plan of care and goals as stated below:     Anticipated Barriers for therapy: none    Medical Necessity is demonstrated by the following  History  Co-morbidities and personal factors that may impact the plan of care Co-morbidities:   high BMI and asthma    Personal Factors:   no deficits     moderate   Examination  Body Structures and Functions, activity limitations and participation restrictions that may impact the plan of care Body Regions:   head  neck  back  lower extremities  upper extremities  trunk    Body Systems:    gross symmetry  ROM  strength  gross coordinated movement  balance  transfers    Participation Restrictions:   None stated    Activity limitations:   Learning and applying knowledge  no deficits    General Tasks and Commands  no deficits    Communication  no deficits    Mobility  lifting and carrying objects  walking  driving (bike, car, motorcycle)    Self care  no deficits    Domestic Life  shopping  cooking  doing house work (cleaning house, washing dishes, laundry)    Interactions/Relationships  no deficits    Life Areas  no deficits    Community and Social Life  no deficits         high   Clinical Presentation evolving clinical presentation with changing clinical characteristics moderate   Decision Making/ Complexity Score: moderate     Goals:  Short Term Goals:  6 weeks  1.Report decreased L knee pain </=4/10 at worst  to increase tolerance for ADLs, walking, and curbs.  2. Increase knee AROM to > 90 degrees in order to be able to perform ADLs without difficulty.  3. Increase strength by 1/3 MMT grade in L LE  to increase tolerance for ADL and work activities.  4. Pt to tolerate HEP to improve ROM  and independence with ADL's.     Long Term Goals: 12 weeks  1.Report decreased L knee pain </= 1/10 at worst  to increase tolerance for work activities and ADLs.  2.Increase strength to >/= 4+/5 in BLEto increase tolerance for ADL and work activities.  3. Pt will report at </= 40% impaired on KNEE FOTO to demonstrate increase in LE function with every day tasks.   4. Pt will negotiate 8 steps and ambulate community distances at >/= Mod I for increased functional mobility.  5. Increase knee ROM to WNL in order to be able to perform ADLs without difficulty.    Plan     Plan of care Certification: 9/10/2018 to 12/10/18.    Outpatient Physical Therapy 3 times weekly for 8 weeks to include the following interventions: Gait Training, Manual Therapy, Moist Heat/ Ice, Neuromuscular Re-ed, Orthotic Management and Training, Patient Education, Therapeutic Activites and Therapeutic Exercise.     Dora Telles, PT     I certify the need for these services furnished under this plan of treatment and while under my care.  Phill Tran MD

## 2018-09-10 NOTE — PATIENT INSTRUCTIONS
QUAD SET - TOWEL UNDER KNEE        Place a small towel roll under your knee, tighten your top thigh muscle to press the back of your knee downward while pressing on the towel.    Hold 5 seconds.     Do 10 reps. Do 2 sets. Perform 2 sessions per day        © 6618-0137 [employer] All Rights Reserved      Sidelying Hip Abduction        Laying on your side, straighten both legs so they are stacked on top of each other. keeping toes pointed straight forward, raise your top leg up in the air as pictured, no more than 30 degrees, then slowly lower down. Do not let the leg rest and repeat this motion. You will begin to feel the muscles in your buttock and along the side of your leg/hip work.    Do 10 reps. Do 2 sets. Perform 2 sessions per day    © 1336-2600 [employer] All Rights Reserved      PRONE HIP EXTENSION        While lying face down with your knee straight, slowly raise up leg off the ground. Maintain a straight knee the entire time.     Do 10 reps. Do 2 sets. Perform 2 sessions per day    © 5357-3350 [employer] All Rights Reserved      HIP ADDUCTION - SIDELYING        While lying on your side, slowly raise up your bottom leg towards the ceiling. Keep your knee straight the entire time.     Your top leg should be bent at the knee and your foot planted on the ground supporting your body.    Do 10 reps. Do 2 sets. Perform 2 sessions per day    © 2010-2018 [employer] All Rights Reserved      Active Heel slide        Slide your heel toward your bottom, as far as you can. Hold then return to start and repeat.     Hold 5 seconds.    Do 10 reps. Do 2 sets. Perform 2 sessions per day    © 1939-4110 [employer] All Rights Reserved

## 2018-09-12 ENCOUNTER — CLINICAL SUPPORT (OUTPATIENT)
Dept: REHABILITATION | Facility: HOSPITAL | Age: 23
End: 2018-09-12
Attending: ORTHOPAEDIC SURGERY
Payer: MEDICAID

## 2018-09-12 DIAGNOSIS — R53.1 DECREASED STRENGTH: ICD-10-CM

## 2018-09-12 DIAGNOSIS — M25.669 DECREASED RANGE OF MOTION (ROM) OF KNEE: ICD-10-CM

## 2018-09-12 DIAGNOSIS — M25.561 RIGHT KNEE PAIN, UNSPECIFIED CHRONICITY: ICD-10-CM

## 2018-09-12 DIAGNOSIS — R26.89 DECREASED FUNCTIONAL MOBILITY: ICD-10-CM

## 2018-09-12 PROCEDURE — 97110 THERAPEUTIC EXERCISES: CPT | Mod: PN

## 2018-09-14 ENCOUNTER — LAB VISIT (OUTPATIENT)
Dept: LAB | Facility: HOSPITAL | Age: 23
End: 2018-09-14
Attending: OBSTETRICS & GYNECOLOGY
Payer: MEDICAID

## 2018-09-14 ENCOUNTER — OFFICE VISIT (OUTPATIENT)
Dept: OBSTETRICS AND GYNECOLOGY | Facility: CLINIC | Age: 23
End: 2018-09-14
Payer: MEDICAID

## 2018-09-14 ENCOUNTER — CLINICAL SUPPORT (OUTPATIENT)
Dept: REHABILITATION | Facility: HOSPITAL | Age: 23
End: 2018-09-14
Attending: ORTHOPAEDIC SURGERY
Payer: MEDICAID

## 2018-09-14 VITALS
HEIGHT: 67 IN | SYSTOLIC BLOOD PRESSURE: 130 MMHG | DIASTOLIC BLOOD PRESSURE: 76 MMHG | BODY MASS INDEX: 32.28 KG/M2 | WEIGHT: 205.69 LBS

## 2018-09-14 DIAGNOSIS — N91.2 AMENORRHEA: Primary | ICD-10-CM

## 2018-09-14 DIAGNOSIS — M25.561 RIGHT KNEE PAIN, UNSPECIFIED CHRONICITY: ICD-10-CM

## 2018-09-14 DIAGNOSIS — R26.89 DECREASED FUNCTIONAL MOBILITY: ICD-10-CM

## 2018-09-14 DIAGNOSIS — N91.2 AMENORRHEA: ICD-10-CM

## 2018-09-14 DIAGNOSIS — M25.669 DECREASED RANGE OF MOTION (ROM) OF KNEE: ICD-10-CM

## 2018-09-14 DIAGNOSIS — R53.1 DECREASED STRENGTH: ICD-10-CM

## 2018-09-14 LAB
ABO + RH BLD: NORMAL
BLD GP AB SCN CELLS X3 SERPL QL: NORMAL
HCG INTACT+B SERPL-ACNC: NORMAL MIU/ML

## 2018-09-14 PROCEDURE — 86592 SYPHILIS TEST NON-TREP QUAL: CPT

## 2018-09-14 PROCEDURE — 87340 HEPATITIS B SURFACE AG IA: CPT

## 2018-09-14 PROCEDURE — 97110 THERAPEUTIC EXERCISES: CPT | Mod: PN

## 2018-09-14 PROCEDURE — 99213 OFFICE O/P EST LOW 20 MIN: CPT | Mod: PBBFAC,PO | Performed by: OBSTETRICS & GYNECOLOGY

## 2018-09-14 PROCEDURE — 86762 RUBELLA ANTIBODY: CPT

## 2018-09-14 PROCEDURE — 87491 CHLMYD TRACH DNA AMP PROBE: CPT

## 2018-09-14 PROCEDURE — 84702 CHORIONIC GONADOTROPIN TEST: CPT

## 2018-09-14 PROCEDURE — 99203 OFFICE O/P NEW LOW 30 MIN: CPT | Mod: TH,S$PBB,, | Performed by: OBSTETRICS & GYNECOLOGY

## 2018-09-14 PROCEDURE — 87086 URINE CULTURE/COLONY COUNT: CPT

## 2018-09-14 PROCEDURE — 86703 HIV-1/HIV-2 1 RESULT ANTBDY: CPT

## 2018-09-14 PROCEDURE — 86901 BLOOD TYPING SEROLOGIC RH(D): CPT

## 2018-09-14 PROCEDURE — 99999 PR PBB SHADOW E&M-EST. PATIENT-LVL III: CPT | Mod: PBBFAC,,, | Performed by: OBSTETRICS & GYNECOLOGY
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
## 2018-09-14 NOTE — PROGRESS NOTES
Physical Therapy Daily Treatment Note     [facility]  [facility] Number[id number]    Therapy Diagnosis:   Encounter Diagnoses   Name Primary?    Right knee pain, unspecified chronicity     Decreased range of motion (ROM) of knee     Decreased strength     Decreased functional mobility      Physician: Phill Tran MD    Visit Date: 9/14/2018    Physician Orders: PT Eval and Treat   Medical Diagnosis from Referral: Rupture of anterior cruciate ligament of right knee  Evaluation Date: 9/10/2018  Authorization Period Expiration: 12/31/2018  Plan of Care Expiration: 9/10/18 - 12/10/18  Visit # / Visits authorized: 3/12     Time In: 1000  Time Out: 1105  Total Billable Time: 55 minutes (TE-4)      Precautions: Standard and Asthma    Subjective     Pt reports: she saw MD and her told her that her knee was doing well.  She was compliant with home exercise program.  Response to previous treatment: No adverse reaction  Functional change: no change    Pain: 3/10  Location: right knee      Objective     Deepa received therapeutic exercises to develop strength, endurance, ROM and flexibility for 40 minutes including:      NWB x 6 weeks  Post-Op: 2 weeks    QS w/ estim x 5'  SLR w/ estim x 5'  Hip 4-way: 3x10 R with brace locked in extension  Active heel slides: NEXT      Deepa received the following manual therapy techniques: Joint mobilizations, Myofacial release, Manual Lymphatic Drainage and Soft tissue Mobilization were applied to the: R LE for 15 minutes, including:  STM/MFR for edema reduction, STM/MFR to R quad, R HS, R gastroc, R ITB, grade III-IV patellar mobs in all directions, gentle knee extension stretch      Deepa received the following supervised modalities after being cleared for contradictions: NMES Electrical Stimulation:  Deepa received NMES Electrical Stimulation to elicit muscle contraction of the R quadriceps. Pt received stimulation at a rate of 80 pps with symmetric  current, ramp of 2 seconds with 10 second on time and 10 second off time. Patient tolerated treatment well without any adverse effects.       Deepa received cold pack for 10 minutes to R knee in supine with B LE propped on bolster.      Home Exercises Provided and Patient Education Provided     Written Home Exercises Provided: Patient instructed to cont prior HEP.  Exercises were reviewed and Deepa was able to demonstrate them prior to the end of the session.  Deepa demonstrated good  understanding of the education provided.     See EMR under Patient Instructions for exercises provided prior visit.    Assessment     Pt tolerated treatment well with good quad contraction with estim. Pt with approximately 1 degree o f extension lag after 5 reps of SLR.    Deepa is progressing well towards her goals.   Pt prognosis is Good.     Pt will continue to benefit from skilled outpatient physical therapy to address the deficits listed in the problem list box on initial evaluation, provide pt/family education and to maximize pt's level of independence in the home and community environment.     Pt's spiritual, cultural and educational needs considered and pt agreeable to plan of care and goals.    Anticipated barriers to physical therapy: none    Goals:     Short Term Goals:  6 weeks  1.Report decreased L knee pain </=4/10 at worst  to increase tolerance for ADLs, walking, and curbs.  2. Increase knee AROM to > 90 degrees in order to be able to perform ADLs without difficulty.  3. Increase strength by 1/3 MMT grade in L LE  to increase tolerance for ADL and work activities.  4. Pt to tolerate HEP to improve ROM and independence with ADL's.     Long Term Goals: 12 weeks  1.Report decreased L knee pain </= 1/10 at worst  to increase tolerance for work activities and ADLs.  2.Increase strength to >/= 4+/5 in BLEto increase tolerance for ADL and work activities.  3. Pt will report at </= 40% impaired on KNEE FOTO to demonstrate  increase in LE function with every day tasks.   4. Pt will negotiate 8 steps and ambulate community distances at >/= Mod I for increased functional mobility.  5. Increase knee ROM to WNL in order to be able to perform ADLs without difficulty.      Plan     Cont POC.    Dora eTlles, PT

## 2018-09-15 LAB — RPR SER QL: NORMAL

## 2018-09-16 LAB — BACTERIA UR CULT: NORMAL

## 2018-09-17 LAB
HBV SURFACE AG SERPL QL IA: NEGATIVE
HIV 1+2 AB+HIV1 P24 AG SERPL QL IA: NEGATIVE
RUBV IGG SER-ACNC: 101 IU/ML
RUBV IGG SER-IMP: REACTIVE

## 2018-09-18 ENCOUNTER — CLINICAL SUPPORT (OUTPATIENT)
Dept: REHABILITATION | Facility: HOSPITAL | Age: 23
End: 2018-09-18
Attending: ORTHOPAEDIC SURGERY
Payer: MEDICAID

## 2018-09-18 DIAGNOSIS — M25.561 RIGHT KNEE PAIN, UNSPECIFIED CHRONICITY: ICD-10-CM

## 2018-09-18 DIAGNOSIS — R53.1 DECREASED STRENGTH: ICD-10-CM

## 2018-09-18 DIAGNOSIS — R26.89 DECREASED FUNCTIONAL MOBILITY: ICD-10-CM

## 2018-09-18 DIAGNOSIS — M25.669 DECREASED RANGE OF MOTION (ROM) OF KNEE: ICD-10-CM

## 2018-09-18 LAB
C TRACH DNA SPEC QL NAA+PROBE: NOT DETECTED
N GONORRHOEA DNA SPEC QL NAA+PROBE: NOT DETECTED

## 2018-09-18 PROCEDURE — 97110 THERAPEUTIC EXERCISES: CPT | Mod: PN

## 2018-09-18 NOTE — PROGRESS NOTES
Physical Therapy Daily Treatment Note     [facility]  Cl[facility] Number: [id number]    Therapy Diagnosis:   Encounter Diagnoses   Name Primary?    Right knee pain, unspecified chronicity     Decreased range of motion (ROM) of knee     Decreased strength     Decreased functional mobility      Physician: Phill Tran MD    Visit Date: 9/18/2018    Physician Orders: PT Eval and Treat   Medical Diagnosis from Referral: Rupture of anterior cruciate ligament of right knee  Evaluation Date: 9/10/2018  Authorization Period Expiration: 12/31/2018  Plan of Care Expiration: 9/10/18 - 12/10/18  Visit # / Visits authorized: 4/12     Time In: 1105  Time Out: 1210  Total Billable Time: 40 minutes (TE-2)      Precautions: Standard and Asthma    Subjective     Pt reports: knee is feeling better.    She was compliant with home exercise program.  Response to previous treatment: no adverse reaction  Functional change: none    Pain: 4/10  Location: right knee      Objective     Deepa received therapeutic exercises to develop strength and ROM for 40 minutes with PTA 1:1 and 15 minutes with supervision including:  See below    NWB x 6 weeks  Post-Op: 2 weeks     QS w/ estim x 5'  SLR w/ estim x 5'  Hip 4-way: 3x10 R with brace locked in extension  Active heel slides: 2x10  Standing hip 3-way RLE: 2x10    Deepa received the following manual therapy techniques: Myofacial release, Manual Lymphatic Drainage and Soft tissue Mobilization were applied to the: RLE for 10 minutes, including:  STM/MFR for edema reduction, STM/MFR to R quad, R HS, R gastroc, R ITB, grade III-IV patellar mobs in all directions, gentle knee extension stretch    Deepa received the following supervised modalities after being cleared for contradictions: NMES Electrical Stimulation:  Deepa received NMES Electrical Stimulation to elicit muscle contraction of the R quadriceps. Pt received stimulation at a rate of 80 pps with symmetric  current, ramp of 2 seconds with 10 second on time and 10 second off time. Patient tolerated treatment well without any adverse effects.     Deepa received cold pack for 10 minutes to R knee.      Home Exercises Provided and Patient Education Provided     Education provided:   - cont NWB as instructed.  Perform HEP daily    Written Home Exercises Provided: Patient instructed to cont prior HEP.  Exercises were reviewed and Deepa was able to demonstrate them prior to the end of the session.  Deepa demonstrated good  understanding of the education provided.     See EMR under Patient Instructions for exercises provided prior visit.    Assessment     Pt tolerates all therapy activities without difficulties.  Relays decrease in pain upon completion of session  Deepa is progressing well towards her goals.   Pt prognosis is Excellent.     Pt will continue to benefit from skilled outpatient physical therapy to address the deficits listed in the problem list box on initial evaluation, provide pt/family education and to maximize pt's level of independence in the home and community environment.     Pt's spiritual, cultural and educational needs considered and pt agreeable to plan of care and goals.    Anticipated barriers to physical therapy: none    Goals:   Short Term Goals:  6 weeks  1.Report decreased L knee pain </=4/10 at worst  to increase tolerance for ADLs, walking, and curbs.  2. Increase knee AROM to > 90 degrees in order to be able to perform ADLs without difficulty.  3. Increase strength by 1/3 MMT grade in L LE  to increase tolerance for ADL and work activities.  4. Pt to tolerate HEP to improve ROM and independence with ADL's.     Long Term Goals: 12 weeks  1.Report decreased L knee pain </= 1/10 at worst  to increase tolerance for work activities and ADLs.  2.Increase strength to >/= 4+/5 in BLEto increase tolerance for ADL and work activities.  3. Pt will report at </= 40% impaired on KNEE FOTO to  demonstrate increase in LE function with every day tasks.   4. Pt will negotiate 8 steps and ambulate community distances at >/= Mod I for increased functional mobility.  5. Increase knee ROM to WNL in order to be able to perform ADLs without difficulty.       Plan     Cont POC to progress towards established goals    Amber Cortes, MARLEN

## 2018-09-19 ENCOUNTER — CLINICAL SUPPORT (OUTPATIENT)
Dept: REHABILITATION | Facility: HOSPITAL | Age: 23
End: 2018-09-19
Attending: ORTHOPAEDIC SURGERY
Payer: MEDICAID

## 2018-09-19 DIAGNOSIS — R53.1 DECREASED STRENGTH: ICD-10-CM

## 2018-09-19 DIAGNOSIS — R26.89 DECREASED FUNCTIONAL MOBILITY: ICD-10-CM

## 2018-09-19 DIAGNOSIS — M25.669 DECREASED RANGE OF MOTION (ROM) OF KNEE: ICD-10-CM

## 2018-09-19 DIAGNOSIS — M25.561 RIGHT KNEE PAIN, UNSPECIFIED CHRONICITY: ICD-10-CM

## 2018-09-19 PROCEDURE — 97110 THERAPEUTIC EXERCISES: CPT | Mod: PN

## 2018-09-19 NOTE — PROGRESS NOTES
Physical Therapy Daily Treatment Note     [facility]  [facility] Number[facility] [id number]    Therapy Diagnosis:   Encounter Diagnoses   Name Primary?    Right knee pain, unspecified chronicity     Decreased range of motion (ROM) of knee     Decreased strength     Decreased functional mobility      Physician: Phill Tran MD    Visit Date: 9/19/2018    Physician Orders: PT Eval and Treat   Medical Diagnosis from Referral: Rupture of anterior cruciate ligament of right knee  Evaluation Date: 9/10/2018  Authorization Period Expiration: 12/31/2018  Plan of Care Expiration: 9/10/18 - 12/10/18  Visit # / Visits authorized: 5/12     Time In: 1000  Time Out: 1100  Total Billable Time: 60 minutes (TE-4)     Precautions: Standard and Asthma    Subjective     Pt reports: no R knee pain and has been compliant with precautions  She was compliant with home exercise program.  Response to previous treatment: no adverse reaction  Functional change: none    Pain: 0/10  Location: right knee      Objective     Deepa received therapeutic exercises to develop strength and ROM for 40 minutes with PT 1:1 including: FOTO next  See below    NWB x 6 weeks  Post-Op: 2 weeks    QS w/ estim x 5' R  SLR w/ estim x 5' R  Hamstring/calf stretch long sitting  Hip 4-way: 3x10 R with brace locked in extension   Active heel slides: 2x10 R  Standing hip 3-way RLE: 2x10     Deepa received the following manual therapy techniques: Myofacial release, Manual Lymphatic Drainage and Soft tissue Mobilization were applied to the: RLE for 10 minutes, including:  STM/MFR for edema reduction, STM/MFR to R quad, R HS, R gastroc, R ITB, grade III-IV patellar mobs in all directions, gentle knee extension stretch    Deepa received NMR x 10 after being cleared for contradictions: NMES Electrical Stimulation:  Deepa received NMES Electrical Stimulation to elicit muscle contraction of the R quadriceps VMO. Pt received stimulation at a rate of 80  pps with symmetric current, ramp of 2 seconds with 10 second on time and 10 second off time. Patient tolerated treatment well without any adverse effects.     Deepa received cold pack for 0 minutes to R knee.      Home Exercises Provided and Patient Education Provided     Education provided:   - cont NWB as instructed.  Perform HEP daily    Written Home Exercises Provided: Patient instructed to cont prior HEP.  Exercises were reviewed and Deepa was able to demonstrate them prior to the end of the session.  Deepa demonstrated good  understanding of the education provided.     See EMR under Patient Instructions for exercises provided prior visit.    Assessment     Pt tolerated all TE well. No TTP and mod R knee edema present especially above R patella. Pt demo decreased R knee extension (full passive) and decreased R calf length (poor dorsiflexion).    Deepa is progressing well towards her goals.   Pt prognosis is Excellent.     Pt will continue to benefit from skilled outpatient physical therapy to address the deficits listed in the problem list box on initial evaluation, provide pt/family education and to maximize pt's level of independence in the home and community environment.     Pt's spiritual, cultural and educational needs considered and pt agreeable to plan of care and goals.    Anticipated barriers to physical therapy: none    Goals:   Short Term Goals:  6 weeks  1.Report decreased L knee pain </=4/10 at worst  to increase tolerance for ADLs, walking, and curbs.  2. Increase knee AROM to > 90 degrees in order to be able to perform ADLs without difficulty.  3. Increase strength by 1/3 MMT grade in L LE  to increase tolerance for ADL and work activities.  4. Pt to tolerate HEP to improve ROM and independence with ADL's.     Long Term Goals: 12 weeks  1.Report decreased L knee pain </= 1/10 at worst  to increase tolerance for work activities and ADLs.  2.Increase strength to >/= 4+/5 in BLEto increase  tolerance for ADL and work activities.  3. Pt will report at </= 40% impaired on KNEE FOTO to demonstrate increase in LE function with every day tasks.   4. Pt will negotiate 8 steps and ambulate community distances at >/= Mod I for increased functional mobility.  5. Increase knee ROM to WNL in order to be able to perform ADLs without difficulty.       Plan     Cont POC to progress towards established goals    Asher Bonner, PT

## 2018-09-21 ENCOUNTER — CLINICAL SUPPORT (OUTPATIENT)
Dept: REHABILITATION | Facility: HOSPITAL | Age: 23
End: 2018-09-21
Attending: ORTHOPAEDIC SURGERY
Payer: MEDICAID

## 2018-09-21 DIAGNOSIS — R26.89 DECREASED FUNCTIONAL MOBILITY: ICD-10-CM

## 2018-09-21 DIAGNOSIS — M25.669 DECREASED RANGE OF MOTION (ROM) OF KNEE: ICD-10-CM

## 2018-09-21 DIAGNOSIS — R53.1 DECREASED STRENGTH: ICD-10-CM

## 2018-09-21 DIAGNOSIS — M25.561 RIGHT KNEE PAIN, UNSPECIFIED CHRONICITY: ICD-10-CM

## 2018-09-21 PROCEDURE — 97110 THERAPEUTIC EXERCISES: CPT | Mod: PN

## 2018-09-21 NOTE — PROGRESS NOTES
Physical Therapy Daily Treatment Note     [facility]  [facility] Number[facility] [id number]    Therapy Diagnosis:   Encounter Diagnoses   Name Primary?    Right knee pain, unspecified chronicity     Decreased range of motion (ROM) of knee     Decreased strength     Decreased functional mobility      Physician: Phill Tran MD    Visit Date: 9/21/2018    Physician Orders: PT Eval and Treat   Medical Diagnosis from Referral: Rupture of anterior cruciate ligament of right knee  Evaluation Date: 9/10/2018  Authorization Period Expiration: 12/31/2018  Plan of Care Expiration: 9/10/18 - 12/10/18  Visit # / Visits authorized: 6/12     Time In: 1000  Time Out: 1055  Total Billable Time: 55 minutes (TE-3)     Precautions: Standard and Asthma    Subjective     Pt reports: that she does not have as much posterior R knee pain anymore after the stretching. Cont to use B crutches.   She was compliant with home exercise program.  Response to previous treatment: no adverse reaction  Functional change: none    Pain: 0/10  Location: right knee      Objective     Deepa received therapeutic exercises to develop strength and ROM for 35 minutes with PT 1:1 including: FOTO done today  See below    NWB x 6 weeks  Post-Op: 2 weeks    QS w/ estim x 5' R  SLR w/ estim x 5' R  Hamstring/calf stretch supine with strap R 3x30''  Hip 4-way: 2x20 R with brace locked in extension   Active heel slides: 3x10 R  Standing hip 3-way: 3x10 R    Deepa received the following manual therapy techniques: Myofacial release, Manual Lymphatic Drainage and Soft tissue Mobilization were applied to the: RLE for 10 minutes, including:  STM/MFR for edema reduction, STM/MFR to R quad, R HS, R gastroc, R ITB, grade III-IV patellar mobs in all directions, gentle knee extension stretch    Deepa received NMR x 10 after being cleared for contradictions: NMES Electrical Stimulation:  Deepa received NMES Electrical Stimulation to elicit muscle  contraction of the R quadriceps VMO. Pt received stimulation at a rate of 80 pps with symmetric current, ramp of 2 seconds with 10 second on time and 10 second off time. Patient tolerated treatment well without any adverse effects.     Deepa received cold pack for 0 minutes to R knee.      Home Exercises Provided and Patient Education Provided     Education provided:   - cont NWB as instructed.  Perform HEP daily    Written Home Exercises Provided: Patient instructed to cont prior HEP.  Exercises were reviewed and Deepa was able to demonstrate them prior to the end of the session.  Deepa demonstrated good  understanding of the education provided.     See EMR under Patient Instructions for exercises provided prior visit.    Assessment     Pt demo improved R knee extension with dorsiflexion, and tolerated increased reps well.    Deepa is progressing well towards her goals.   Pt prognosis is Excellent.     Pt will continue to benefit from skilled outpatient physical therapy to address the deficits listed in the problem list box on initial evaluation, provide pt/family education and to maximize pt's level of independence in the home and community environment.     Pt's spiritual, cultural and educational needs considered and pt agreeable to plan of care and goals.    Anticipated barriers to physical therapy: none    Goals:   Short Term Goals:  6 weeks  1.Report decreased L knee pain </=4/10 at worst  to increase tolerance for ADLs, walking, and curbs.  2. Increase knee AROM to > 90 degrees in order to be able to perform ADLs without difficulty.  3. Increase strength by 1/3 MMT grade in L LE  to increase tolerance for ADL and work activities.  4. Pt to tolerate HEP to improve ROM and independence with ADL's.     Long Term Goals: 12 weeks  1.Report decreased L knee pain </= 1/10 at worst  to increase tolerance for work activities and ADLs.  2.Increase strength to >/= 4+/5 in BLEto increase tolerance for ADL and work  activities.  3. Pt will report at </= 40% impaired on KNEE FOTO to demonstrate increase in LE function with every day tasks.   4. Pt will negotiate 8 steps and ambulate community distances at >/= Mod I for increased functional mobility.  5. Increase knee ROM to WNL in order to be able to perform ADLs without difficulty.       Plan     Cont POC to progress towards established goals    Asher Bonner, PT

## 2018-09-24 NOTE — PROGRESS NOTES
"  Physical Therapy Daily Treatment Note     [facility]  [facility] Number[facility] [id number]    Therapy Diagnosis:   Encounter Diagnoses   Name Primary?    Right knee pain, unspecified chronicity     Decreased range of motion (ROM) of knee     Decreased strength     Decreased functional mobility      Physician: Phill Tran MD    Visit Date: 9/25/2018    Physician Orders: PT Eval and Treat   Medical Diagnosis from Referral: Rupture of anterior cruciate ligament of right knee  Evaluation Date: 9/10/2018  Authorization Period Expiration: 12/31/2018  Plan of Care Expiration: 9/10/18 - 12/10/18  Visit # / Visits authorized: ***/12     Time In: ***  Time Out: ***  Total Billable Time: *** minutes (TE-***)     Precautions: Standard and Asthma    Subjective     Pt reports: ***.  She {Actions; was/was not:76888} compliant with home exercise program.  Response to previous treatment: ***  Functional change: ***    Pain: {0-10:33042::"0"}/10  Location: {RIGHT/LEFT/BILATERAL:07911} {LOCATION ON BODY:46616}     Objective     Deepa received therapeutic exercises to develop {AMB PT PROGRESS OBJECTIVE:51159} for *** minutes including:  ***      NWB x 6 weeks  Post-Op: 2 weeks    QS w/ estim x 5' R  SLR w/ estim x 5' R  Hamstring/calf stretch supine with strap R 3x30''  Hip 4-way: 2x20 R with brace locked in extension   Active heel slides: 3x10 R  Standing hip 3-way: 3x10 R      Deepa received the following manual therapy techniques: {AMB PT PROGRESS MANUAL THERAPY:49390} were applied to the: *** for *** minutes, including:  ***    Deepa participated in neuromuscular re-education activities to improve: {AMB PT PROGRESS NEURO RE-ED:34758} for *** minutes. The following activities were included:  ***    Deepa participated in dynamic functional therapeutic activities to improve functional performance for ***  minutes, including:  ***    Deepa participated in gait training to improve functional mobility and " "safety for ***  minutes, including:  ***    Deepa received the following direct contact modalities after being cleared for contraindications: {AMB PT PROGRESS DIRECT CONTACT MODES:10476}    Deepa received the following supervised modalities after being cleared for contradictions: {AMB PT SUPERVISED MODES:56669}    Deepa received hot pack for *** minutes to ***.    Deepa received cold pack for *** minutes to ***.      Home Exercises Provided and Patient Education Provided     Education provided:   - ***    Written Home Exercises Provided: {Blank single:14559::"yes","Patient instructed to cont prior HEP"}.  Exercises were reviewed and Deepa was able to demonstrate them prior to the end of the session.  Deepa demonstrated {Desc; good/fair/poor:32557} understanding of the education provided.     See EMR under {Blank single:16723::"Media","Patient Instructions"} for exercises provided {Blank single:87382::"9/25/2018","prior visit"}.    Assessment     ***  Deepa {IS/IS NOT:14308} progressing well towards her goals.   Pt prognosis is {REHAB PROGNOSIS OHS:11169}.     Pt will continue to benefit from skilled outpatient physical therapy to address the deficits listed in the problem list box on initial evaluation, provide pt/family education and to maximize pt's level of independence in the home and community environment.     Pt's spiritual, cultural and educational needs considered and pt agreeable to plan of care and goals.    Anticipated barriers to physical therapy: ***    Goals:     Short Term Goals:  6 weeks  1.Report decreased L knee pain </=4/10 at worst  to increase tolerance for ADLs, walking, and curbs.  2. Increase knee AROM to > 90 degrees in order to be able to perform ADLs without difficulty.  3. Increase strength by 1/3 MMT grade in L LE  to increase tolerance for ADL and work activities.  4. Pt to tolerate HEP to improve ROM and independence with ADL's.     Long Term Goals: 12 weeks  1.Report " decreased L knee pain </= 1/10 at worst  to increase tolerance for work activities and ADLs.  2.Increase strength to >/= 4+/5 in BLEto increase tolerance for ADL and work activities.  3. Pt will report at </= 40% impaired on KNEE FOTO to demonstrate increase in LE function with every day tasks.   4. Pt will negotiate 8 steps and ambulate community distances at >/= Mod I for increased functional mobility.  5. Increase knee ROM to WNL in order to be able to perform ADLs without difficulty.      Plan     ***    Dora Telles, PT

## 2018-09-25 ENCOUNTER — CLINICAL SUPPORT (OUTPATIENT)
Dept: REHABILITATION | Facility: HOSPITAL | Age: 23
End: 2018-09-25
Attending: ORTHOPAEDIC SURGERY
Payer: MEDICAID

## 2018-09-25 DIAGNOSIS — R53.1 DECREASED STRENGTH: ICD-10-CM

## 2018-09-25 DIAGNOSIS — M25.669 DECREASED RANGE OF MOTION (ROM) OF KNEE: ICD-10-CM

## 2018-09-25 DIAGNOSIS — R26.89 DECREASED FUNCTIONAL MOBILITY: ICD-10-CM

## 2018-09-25 DIAGNOSIS — M25.561 RIGHT KNEE PAIN, UNSPECIFIED CHRONICITY: ICD-10-CM

## 2018-09-25 PROCEDURE — 97110 THERAPEUTIC EXERCISES: CPT | Mod: PN

## 2018-09-25 NOTE — PROGRESS NOTES
"  Physical Therapy Daily Treatment Note     [facility]  [facility] Number: [id number]    Therapy Diagnosis:   Encounter Diagnoses   Name Primary?    Right knee pain, unspecified chronicity     Decreased range of motion (ROM) of knee     Decreased strength     Decreased functional mobility      Physician: Phill Tran MD    Visit Date: 9/25/2018    Physician Orders: PT Eval and Treat   Medical Diagnosis from Referral: Rupture of anterior cruciate ligament of right knee  Evaluation Date: 9/10/2018  Authorization Period Expiration: 12/31/2018  Plan of Care Expiration: 9/10/18 - 12/10/18  Visit # / Visits authorized: 7/12     Time In: 1001  Time Out: 1100  Total Billable Time: 29 minutes (TE-2)     Precautions: Standard and Asthma    Subjective     Pt reports: "I'm finally feeling like I didn't just have a surgery"  She was compliant with home exercise program.  Response to previous treatment: no adverse reaction  Functional change: no change    Pain: 0/10  Location: right knee      Objective     Deepa received therapeutic exercises to develop strength, endurance, ROM and flexibility for 24 minutes including:  And supervised x 20'    FOTO: 7/10  NWB x 6 weeks  Post-Op: 3 weeks    QS w/ estim x 5' R  SLR w/ estim x 5' R (No Brace)  Hamstring/calf stretch supine with strap R 3x30''  Hip 4-way: 2x20 R with brace locked in extension   Active heel slides: 3x10 R  Standing hip 3-way: 3x10 R      Deepa received the following manual therapy techniques: Joint mobilizations were applied to the: R knee for 5 minutes minutes, including:  Grade III-IV patellar mobs     Deepa received the following supervised modalities after being cleared for contradictions: NMES Electrical Stimulation:  Deepa received NMES Electrical Stimulation to elicit muscle contraction of the R quad. Pt received stimulation at a rate of 80 pps with asymmetric current, ramp of 2 seconds with 10 second on time and 10 second off " time. Patient tolerated treatment well without any adverse effects. With QS and SLR with pt receiving verbal cuing for correct performance.        Deepa received cold pack for 10 minutes to R knee.      Home Exercises Provided and Patient Education Provided       Written Home Exercises Provided: Patient instructed to cont prior HEP.  Exercises were reviewed and Deepa was able to demonstrate them prior to the end of the session.  Deepa demonstrated good  understanding of the education provided.     See EMR under Patient Instructions for exercises provided prior visit.    Assessment     Pt with good quad contraction. Able to perform SLR with good form and no extensor lag    Deepa is progressing well towards her goals.   Pt prognosis is Good.     Pt will continue to benefit from skilled outpatient physical therapy to address the deficits listed in the problem list box on initial evaluation, provide pt/family education and to maximize pt's level of independence in the home and community environment.     Pt's spiritual, cultural and educational needs considered and pt agreeable to plan of care and goals.    Anticipated barriers to physical therapy: none    Goals:     Short Term Goals:  6 weeks  1.Report decreased L knee pain </=4/10 at worst  to increase tolerance for ADLs, walking, and curbs.  2. Increase knee AROM to > 90 degrees in order to be able to perform ADLs without difficulty.  3. Increase strength by 1/3 MMT grade in L LE  to increase tolerance for ADL and work activities.  4. Pt to tolerate HEP to improve ROM and independence with ADL's.     Long Term Goals: 12 weeks  1.Report decreased L knee pain </= 1/10 at worst  to increase tolerance for work activities and ADLs.  2.Increase strength to >/= 4+/5 in BLEto increase tolerance for ADL and work activities.  3. Pt will report at </= 40% impaired on KNEE FOTO to demonstrate increase in LE function with every day tasks.   4. Pt will negotiate 8 steps and  ambulate community distances at >/= Mod I for increased functional mobility.  5. Increase knee ROM to WNL in order to be able to perform ADLs without difficulty.      Plan     Cont POC.    Dora Telles, PT

## 2018-09-26 ENCOUNTER — ROUTINE PRENATAL (OUTPATIENT)
Dept: OBSTETRICS AND GYNECOLOGY | Facility: CLINIC | Age: 23
End: 2018-09-26
Payer: MEDICAID

## 2018-09-26 ENCOUNTER — PROCEDURE VISIT (OUTPATIENT)
Dept: OBSTETRICS AND GYNECOLOGY | Facility: CLINIC | Age: 23
End: 2018-09-26
Payer: MEDICAID

## 2018-09-26 VITALS
SYSTOLIC BLOOD PRESSURE: 110 MMHG | WEIGHT: 203.25 LBS | DIASTOLIC BLOOD PRESSURE: 60 MMHG | BODY MASS INDEX: 31.84 KG/M2

## 2018-09-26 DIAGNOSIS — R11.0 NAUSEA: ICD-10-CM

## 2018-09-26 DIAGNOSIS — Z3A.01 7 WEEKS GESTATION OF PREGNANCY: ICD-10-CM

## 2018-09-26 DIAGNOSIS — Z34.81 ENCOUNTER FOR SUPERVISION OF OTHER NORMAL PREGNANCY IN FIRST TRIMESTER: Primary | ICD-10-CM

## 2018-09-26 DIAGNOSIS — N91.2 AMENORRHEA: ICD-10-CM

## 2018-09-26 DIAGNOSIS — Z36.89 ENCOUNTER TO ESTABLISH GESTATIONAL AGE USING ULTRASOUND: Primary | ICD-10-CM

## 2018-09-26 PROCEDURE — 99212 OFFICE O/P EST SF 10 MIN: CPT | Mod: PBBFAC,TH,PO,25 | Performed by: OBSTETRICS & GYNECOLOGY

## 2018-09-26 PROCEDURE — 76801 OB US < 14 WKS SINGLE FETUS: CPT | Mod: 26,S$PBB,, | Performed by: OBSTETRICS & GYNECOLOGY

## 2018-09-26 PROCEDURE — 76801 OB US < 14 WKS SINGLE FETUS: CPT | Mod: PBBFAC,PO

## 2018-09-26 PROCEDURE — 99999 PR PBB SHADOW E&M-EST. PATIENT-LVL II: CPT | Mod: PBBFAC,,, | Performed by: OBSTETRICS & GYNECOLOGY

## 2018-09-26 PROCEDURE — 99213 OFFICE O/P EST LOW 20 MIN: CPT | Mod: 25,TH,S$PBB, | Performed by: OBSTETRICS & GYNECOLOGY

## 2018-09-26 RX ORDER — ONDANSETRON 4 MG/1
4 TABLET, FILM COATED ORAL EVERY 8 HOURS PRN
Qty: 30 TABLET | Refills: 1 | Status: SHIPPED | OUTPATIENT
Start: 2018-09-26 | End: 2018-12-27

## 2018-09-26 NOTE — PROGRESS NOTES
Reports nausea; rx for zofran sent    AP: 22 yo  female @ 7.3 wks by LMP c/w 7.3 wk sono (EDC 19) who presents for f/u OB visit.    1) [facility]  -s/p dating scan  -Rh positive  -pap from 2017 wnl  -desires genetic testing: scheduled    2) PP  Considering BTL    F/u in 3 wks OB visit Dr luke  F/u in 5 wks KERRY luke MD

## 2018-09-27 ENCOUNTER — TELEPHONE (OUTPATIENT)
Dept: OBSTETRICS AND GYNECOLOGY | Facility: CLINIC | Age: 23
End: 2018-09-27

## 2018-09-27 DIAGNOSIS — R11.0 NAUSEA: Primary | ICD-10-CM

## 2018-09-27 NOTE — TELEPHONE ENCOUNTER
Per pharmacy Ondasetron 4mg is not covered.  Ondansetron ODT is covered.  Please send new Rx to [facility] [address].

## 2018-09-28 ENCOUNTER — CLINICAL SUPPORT (OUTPATIENT)
Dept: REHABILITATION | Facility: HOSPITAL | Age: 23
End: 2018-09-28
Attending: ORTHOPAEDIC SURGERY
Payer: MEDICAID

## 2018-09-28 DIAGNOSIS — R53.1 DECREASED STRENGTH: ICD-10-CM

## 2018-09-28 DIAGNOSIS — R26.89 DECREASED FUNCTIONAL MOBILITY: ICD-10-CM

## 2018-09-28 DIAGNOSIS — M25.561 RIGHT KNEE PAIN, UNSPECIFIED CHRONICITY: ICD-10-CM

## 2018-09-28 DIAGNOSIS — M25.669 DECREASED RANGE OF MOTION (ROM) OF KNEE: ICD-10-CM

## 2018-09-28 PROCEDURE — 97110 THERAPEUTIC EXERCISES: CPT | Mod: PN

## 2018-09-28 NOTE — PROGRESS NOTES
Physical Therapy Daily Treatment Note     [facility]  [id number]    Therapy Diagnosis:   Encounter Diagnoses   Name Primary?    Right knee pain, unspecified chronicity     Decreased range of motion (ROM) of knee     Decreased strength     Decreased functional mobility      Physician: Phill Tran MD    Visit Date: 9/28/2018    Physician Orders: PT Eval and Treat   Medical Diagnosis from Referral: Rupture of anterior cruciate ligament of right knee  Evaluation Date: 9/10/2018  Authorization Period Expiration: 12/31/2018  Plan of Care Expiration: 9/10/18 - 12/10/18  Visit # / Visits authorized: 8/12     Time In: 1105  Time Out: 1150  Total Billable Time: 45 minutes (TE-3)     Precautions: Standard and Asthma    Subjective     Pt reports: all aspects of function are improving. Pt reports standing in shower without brace and feeling ease of doing so. Pt uses brace at all times aside from this.  She was compliant with home exercise program.  Response to previous treatment: no adverse reaction  Functional change: improvement in gait and standing balance.    Pain: 0/10  Location: right knee      Objective     Deepa received therapeutic exercises to develop strength, endurance, ROM and flexibility for 45 minutes including:    FOTO: 8/10  NWB x 6 weeks  Post-Op: 3 weeks    QS w/ estim x 5' R  SLR w/ estim x 5' R (brace unlocked)  Hip 4-way: 1# 3x10 R SLR; 1# 2x20 R remaining; brace locked in extension   Active heel slides: 2x10 R  Standing hip 3-way: Home    Deepa received the following supervised modalities after being cleared for contradictions: NMES Electrical Stimulation:  Deepa received NMES Electrical Stimulation to elicit muscle contraction of the R quad. Pt received stimulation at a rate of 80 pps with asymmetric current, ramp of 2 seconds with 10 second on time and 10 second off time. Patient tolerated treatment well without any adverse effects. With QS and SLR with  pt receiving verbal cuing for correct performance.    Home Exercises Provided and Patient Education Provided     Written Home Exercises Provided: Patient instructed to cont prior HEP.  Exercises were reviewed and Deepa was able to demonstrate them prior to the end of the session.  Deepa demonstrated good  understanding of the education provided.     See EMR under Patient Instructions for exercises provided prior visit.    Assessment     Quad strength improving positively impacting balance and gait. Will likely be prepared for d/c of brace at next MD follow up visit due to this and SLR without knee extensor lag.    Deepa is progressing well towards her goals.   Pt prognosis is Good.   Pt will continue to benefit from skilled outpatient physical therapy to address the deficits listed in the problem list box on initial evaluation, provide pt/family education and to maximize pt's level of independence in the home and community environment.     Pt's spiritual, cultural and educational needs considered and pt agreeable to plan of care and goals.  Anticipated barriers to physical therapy: none    Goals:   Short Term Goals:  6 weeks  1.Report decreased L knee pain </=4/10 at worst  to increase tolerance for ADLs, walking, and curbs.  2. Increase knee AROM to > 90 degrees in order to be able to perform ADLs without difficulty.  3. Increase strength by 1/3 MMT grade in L LE  to increase tolerance for ADL and work activities.  4. Pt to tolerate HEP to improve ROM and independence with ADL's.     Long Term Goals: 12 weeks  1.Report decreased L knee pain </= 1/10 at worst  to increase tolerance for work activities and ADLs.  2.Increase strength to >/= 4+/5 in BLEto increase tolerance for ADL and work activities.  3. Pt will report at </= 40% impaired on KNEE FOTO to demonstrate increase in LE function with every day tasks.   4. Pt will negotiate 8 steps and ambulate community distances at >/= Mod I for increased functional  mobility.  5. Increase knee ROM to WNL in order to be able to perform ADLs without difficulty.    Plan     Cont POC per protocol.    Elzbieta Boyer, PT

## 2018-09-28 NOTE — PROGRESS NOTES
"  Physical Therapy Daily Treatment Note     [facility]  [facility] Number: [id number]    Therapy Diagnosis:   Encounter Diagnoses   Name Primary?    Right knee pain, unspecified chronicity     Decreased range of motion (ROM) of knee     Decreased strength     Decreased functional mobility      Physician: Phill Tran MD    Visit Date: 9/28/2018    Physician Orders: PT Eval and Treat   Medical Diagnosis from Referral: Rupture of anterior cruciate ligament of right knee  Evaluation Date: 9/10/2018  Authorization Period Expiration: 12/31/2018  Plan of Care Expiration: 9/10/18 - 12/10/18  Visit # / Visits authorized: 7/12     Time In: 1001  Time Out: 1100  Total Billable Time: 29 minutes (TE-2)     Precautions: Standard and Asthma    Subjective     Pt reports: "I'm finally feeling like I didn't just have a surgery"  She was compliant with home exercise program.  Response to previous treatment: no adverse reaction  Functional change: no change    Pain: 0/10  Location: right knee      Objective     Deepa received therapeutic exercises to develop strength, endurance, ROM and flexibility for 24 minutes including:  And supervised x 20'    FOTO: 7/10  NWB x 6 weeks  Post-Op: 3 weeks    QS w/ estim x 5' R  SLR w/ estim x 5' R (No Brace)  Long sitting HS/calf stretch with strap 4x30'' L  Hip 4-way: 2x20 R with brace locked in extension   Active heel slides: 3x10 R  Standing hip 3-way: 3x10 R    Deepa received the following manual therapy techniques: Joint mobilizations were applied to the: R knee for 5 minutes minutes, including:  Grade III-IV patellar mobs     Deepa received the following supervised modalities after being cleared for contradictions: NMES Electrical Stimulation:  Deepa received NMES Electrical Stimulation to elicit muscle contraction of the R quad. Pt received stimulation at a rate of 80 pps with asymmetric current, ramp of 2 seconds with 10 second on time and 10 second off time. " Patient tolerated treatment well without any adverse effects. With QS and SLR with pt receiving verbal cuing for correct performance.        Deepa received cold pack for 10 minutes to R knee.      Home Exercises Provided and Patient Education Provided       Written Home Exercises Provided: Patient instructed to cont prior HEP.  Exercises were reviewed and Deepa was able to demonstrate them prior to the end of the session.  Deepa demonstrated good  understanding of the education provided.     See EMR under Patient Instructions for exercises provided prior visit.    Assessment     Pt with good quad contraction. Able to perform SLR with good form and no extensor lag    Deepa is progressing well towards her goals.   Pt prognosis is Good.     Pt will continue to benefit from skilled outpatient physical therapy to address the deficits listed in the problem list box on initial evaluation, provide pt/family education and to maximize pt's level of independence in the home and community environment.     Pt's spiritual, cultural and educational needs considered and pt agreeable to plan of care and goals.    Anticipated barriers to physical therapy: none    Goals:     Short Term Goals:  6 weeks  1.Report decreased L knee pain </=4/10 at worst  to increase tolerance for ADLs, walking, and curbs.  2. Increase knee AROM to > 90 degrees in order to be able to perform ADLs without difficulty.  3. Increase strength by 1/3 MMT grade in L LE  to increase tolerance for ADL and work activities.  4. Pt to tolerate HEP to improve ROM and independence with ADL's.     Long Term Goals: 12 weeks  1.Report decreased L knee pain </= 1/10 at worst  to increase tolerance for work activities and ADLs.  2.Increase strength to >/= 4+/5 in BLEto increase tolerance for ADL and work activities.  3. Pt will report at </= 40% impaired on KNEE FOTO to demonstrate increase in LE function with every day tasks.   4. Pt will negotiate 8 steps and ambulate  community distances at >/= Mod I for increased functional mobility.  5. Increase knee ROM to WNL in order to be able to perform ADLs without difficulty.      Plan     Cont POC.    Asher Bonner, PT

## 2018-10-02 ENCOUNTER — CLINICAL SUPPORT (OUTPATIENT)
Dept: REHABILITATION | Facility: HOSPITAL | Age: 23
End: 2018-10-02
Attending: ORTHOPAEDIC SURGERY
Payer: MEDICAID

## 2018-10-02 DIAGNOSIS — M25.669 DECREASED RANGE OF MOTION OF KNEE, UNSPECIFIED LATERALITY: ICD-10-CM

## 2018-10-02 DIAGNOSIS — R26.89 DECREASED FUNCTIONAL MOBILITY: ICD-10-CM

## 2018-10-02 DIAGNOSIS — M25.561 RIGHT KNEE PAIN, UNSPECIFIED CHRONICITY: ICD-10-CM

## 2018-10-02 DIAGNOSIS — R53.1 DECREASED STRENGTH: ICD-10-CM

## 2018-10-02 PROCEDURE — 97110 THERAPEUTIC EXERCISES: CPT | Mod: PN

## 2018-10-02 NOTE — PROGRESS NOTES
"  Physical Therapy Daily Treatment Note     [facility]  [id number]    Therapy Diagnosis:   Encounter Diagnoses   Name Primary?    Right knee pain, unspecified chronicity     Decreased range of motion of knee, unspecified laterality     Decreased strength     Decreased functional mobility      Physician: Phill Tran MD    Visit Date: 10/2/2018    Physician Orders: PT Eval and Treat   Medical Diagnosis from Referral: Rupture of anterior cruciate ligament of right knee  Evaluation Date: 9/10/2018  Authorization Period Expiration: 12/31/2018  Plan of Care Expiration: 9/10/18 - 12/10/18  Visit # / Visits authorized: 9/12     Time In: 1100  Time Out: 1200  Total Billable Time: 55 minutes (TE-4)     Precautions: Standard and Asthma    Subjective     Pt reports: knee is feeling good.  Reports that she has been trying to increase weight through RLE with standing and walking.  Pt was reminded that she is to be NWB for 6 weeks  She was compliant with home exercise program.  Response to previous treatment: no adverse reaction  Functional change: none    Pain: 2/10  Location: right knee      Objective     Deepa received therapeutic exercises to develop strength and ROM for 55 minutes with PTA 1:1 including:  See below      FOTO: 9/10  NEXT  NWB x 6 weeks  Post-Op: 4 weeks     QS w/ estim x 5' R  SLR w/ estim x 5' R (brace unlocked)  Hip 4-way: 1# 3x10 R SLR; 1# 3x15 R remaining; brace locked in extension   Active heel slides: 3x10 R  Standing hip 3-way: 2x10 R  HHS and calf stretch: R supine w/strap 3x30" ea  Bike: 5' partial revs     Deepa received the following supervised modalities after being cleared for contradictions: NMES Electrical Stimulation:  Deepa received NMES Electrical Stimulation to elicit muscle contraction of the R quad. Pt received stimulation at a rate of 80 pps with asymmetric current, ramp of 2 seconds with 10 second on time and 10 second off time. Patient " tolerated treatment well without any adverse effects. With QS and SLR with pt receiving verbal cuing for correct performance      Deepa received cold pack for 10 minutes to R knee.      Home Exercises Provided and Patient Education Provided     Education provided:   - Pt educated to maintain NWB status at all times    Written Home Exercises Provided: Patient instructed to cont prior HEP.  Exercises were reviewed and Depea was able to demonstrate them prior to the end of the session.  Deepa demonstrated good  understanding of the education provided.     See EMR under Patient Instructions for exercises provided prior visit.    Assessment     Pt tolerates increased therapy activities without difficulties or c/o increased pain  Deepa is progressing well towards her goals.   Pt prognosis is Excellent.     Pt will continue to benefit from skilled outpatient physical therapy to address the deficits listed in the problem list box on initial evaluation, provide pt/family education and to maximize pt's level of independence in the home and community environment.     Pt's spiritual, cultural and educational needs considered and pt agreeable to plan of care and goals.    Anticipated barriers to physical therapy: none    Goals:   Short Term Goals:  6 weeks  1.Report decreased L knee pain </=4/10 at worst  to increase tolerance for ADLs, walking, and curbs.  2. Increase knee AROM to > 90 degrees in order to be able to perform ADLs without difficulty.  3. Increase strength by 1/3 MMT grade in L LE  to increase tolerance for ADL and work activities.  4. Pt to tolerate HEP to improve ROM and independence with ADL's.     Long Term Goals: 12 weeks  1.Report decreased L knee pain </= 1/10 at worst  to increase tolerance for work activities and ADLs.  2.Increase strength to >/= 4+/5 in BLEto increase tolerance for ADL and work activities.  3. Pt will report at </= 40% impaired on KNEE FOTO to demonstrate increase in LE function with  every day tasks.   4. Pt will negotiate 8 steps and ambulate community distances at >/= Mod I for increased functional mobility.  5. Increase knee ROM to WNL in order to be able to perform ADLs without difficulty    Plan     Cont POC to progress towards established goals    Amber Cortes, PTA

## 2018-10-09 ENCOUNTER — CLINICAL SUPPORT (OUTPATIENT)
Dept: REHABILITATION | Facility: HOSPITAL | Age: 23
End: 2018-10-09
Attending: ORTHOPAEDIC SURGERY
Payer: MEDICAID

## 2018-10-09 DIAGNOSIS — R26.89 DECREASED FUNCTIONAL MOBILITY: ICD-10-CM

## 2018-10-09 DIAGNOSIS — R53.1 DECREASED STRENGTH: ICD-10-CM

## 2018-10-09 DIAGNOSIS — M25.561 RIGHT KNEE PAIN, UNSPECIFIED CHRONICITY: ICD-10-CM

## 2018-10-09 DIAGNOSIS — M25.669 DECREASED RANGE OF MOTION OF KNEE, UNSPECIFIED LATERALITY: ICD-10-CM

## 2018-10-09 PROCEDURE — 97110 THERAPEUTIC EXERCISES: CPT | Mod: PN

## 2018-10-09 NOTE — PROGRESS NOTES
"  Physical Therapy Daily Treatment Note     [facility]  [facility] Number[facility] [id number]    Therapy Diagnosis:   Encounter Diagnoses   Name Primary?    Right knee pain, unspecified chronicity     Decreased range of motion of knee, unspecified laterality     Decreased strength     Decreased functional mobility      Physician: Phill Tran MD    Visit Date: 10/9/2018    Physician Orders: PT Eval and Treat   Medical Diagnosis from Referral: Rupture of anterior cruciate ligament of right knee  Evaluation Date: 9/10/2018  Authorization Period Expiration: 12/31/2018  Plan of Care Expiration: 9/10/18 - 12/10/18  Visit # / Visits authorized: 9/12     Time In: 1002  Time Out: 1100  Total Billable Time: 58 minutes (TE-4)     Precautions: Standard and Asthma      Subjective     Pt reports: no new complaints.  She was compliant with home exercise program.  Response to previous treatment: no adverse  Functional change: no change    Pain: 0/10  Location: right knee      Objective     Deepa received therapeutic exercises to develop strength, endurance, ROM and flexibility for 58 minutes including:  Log below      FOTO: 9/10  NEXT  NWB x 6 weeks  Post-Op: 4 weeks     QS w/ estim x 5' R  SLR w/ estim x 5' R (brace unlocked)  SLR w/ hold (NEXT)  Hip 4-way: 1# 3x10 R SLR; 1# 3x15 R remaining; brace locked in extension   Active heel slides: 3x10 R  Standing hip 3-way: 2x10 R  HHS and calf stretch: R supine w/strap 3x30" ea  Knee extension iso on cybex: 2x10 5" hold  Bike: 5' partial revs      R knee AROM:  +1-0-130       Deepa received the following supervised modalities after being cleared for contradictions: NMES Electrical Stimulation:  Deepa received NMES Electrical Stimulation to elicit muscle contraction of the R quad. Pt received stimulation at a rate of 80 pps with asymmetric current, ramp of 2 seconds with 10 second on time and 10 second off time. Patient tolerated treatment well without any adverse " effects. With QS and SLR with pt receiving verbal cuing for correct performance          Home Exercises Provided and Patient Education Provided     Education provided:   - NWB for 2 more weeks    Written Home Exercises Provided: Patient instructed to cont prior HEP.  Exercises were reviewed and Deepa was able to demonstrate them prior to the end of the session.  Deepa demonstrated good  understanding of the education provided.     See EMR under Patient Instructions for exercises provided prior visit.    Assessment     Pt tolerated treatment well. Good quad contraction and no extension lag w/ SLR.    Deepa is progressing well towards her goals.   Pt prognosis is Good.     Pt will continue to benefit from skilled outpatient physical therapy to address the deficits listed in the problem list box on initial evaluation, provide pt/family education and to maximize pt's level of independence in the home and community environment.     Pt's spiritual, cultural and educational needs considered and pt agreeable to plan of care and goals.    Anticipated barriers to physical therapy: none    Goals:     Short Term Goals:  6 weeks  1.Report decreased L knee pain </=4/10 at worst  to increase tolerance for ADLs, walking, and curbs.  2. Increase knee AROM to > 90 degrees in order to be able to perform ADLs without difficulty.  3. Increase strength by 1/3 MMT grade in L LE  to increase tolerance for ADL and work activities.  4. Pt to tolerate HEP to improve ROM and independence with ADL's.     Long Term Goals: 12 weeks  1.Report decreased L knee pain </= 1/10 at worst  to increase tolerance for work activities and ADLs.  2.Increase strength to >/= 4+/5 in BLEto increase tolerance for ADL and work activities.  3. Pt will report at </= 40% impaired on KNEE FOTO to demonstrate increase in LE function with every day tasks.   4. Pt will negotiate 8 steps and ambulate community distances at >/= Mod I for increased functional  mobility.  5. Increase knee ROM to WNL in order to be able to perform ADLs without difficulty.      Plan     Cont POC.    Dora Telles, PT

## 2018-10-12 ENCOUNTER — ROUTINE PRENATAL (OUTPATIENT)
Dept: OBSTETRICS AND GYNECOLOGY | Facility: CLINIC | Age: 23
End: 2018-10-12
Payer: MEDICAID

## 2018-10-12 VITALS
WEIGHT: 205.69 LBS | DIASTOLIC BLOOD PRESSURE: 60 MMHG | SYSTOLIC BLOOD PRESSURE: 122 MMHG | BODY MASS INDEX: 32.22 KG/M2

## 2018-10-12 DIAGNOSIS — Z3A.09 9 WEEKS GESTATION OF PREGNANCY: ICD-10-CM

## 2018-10-12 DIAGNOSIS — Z34.81 ENCOUNTER FOR SUPERVISION OF OTHER NORMAL PREGNANCY IN FIRST TRIMESTER: Primary | ICD-10-CM

## 2018-10-12 PROCEDURE — 99213 OFFICE O/P EST LOW 20 MIN: CPT | Mod: TH,S$PBB,, | Performed by: OBSTETRICS & GYNECOLOGY

## 2018-10-12 PROCEDURE — 99999 PR PBB SHADOW E&M-EST. PATIENT-LVL III: CPT | Mod: PBBFAC,,, | Performed by: OBSTETRICS & GYNECOLOGY

## 2018-10-12 PROCEDURE — 99213 OFFICE O/P EST LOW 20 MIN: CPT | Mod: PBBFAC,TH,PO | Performed by: OBSTETRICS & GYNECOLOGY

## 2018-10-12 NOTE — PROGRESS NOTES
Reports nausea; improved with zofran    AP: 24 yo  female @ 9.5 wks by LMP c/w 7.3 wk sono (EDC 19) who presents for f/u OB visit.    1) SIUP  -s/p dating scan  -Rh positive  -pap from 2017 wnl  -desires genetic testing: scheduled    2) PP  Considering BTL    F/u in 3 wks OB visit MFM  F/u in 5 wks OB visit Dr Jovany luke MD

## 2018-10-16 ENCOUNTER — TELEPHONE (OUTPATIENT)
Dept: REHABILITATION | Facility: HOSPITAL | Age: 23
End: 2018-10-16

## 2018-10-23 ENCOUNTER — DOCUMENTATION ONLY (OUTPATIENT)
Dept: REHABILITATION | Facility: HOSPITAL | Age: 23
End: 2018-10-23

## 2018-10-23 DIAGNOSIS — M25.561 RIGHT KNEE PAIN, UNSPECIFIED CHRONICITY: ICD-10-CM

## 2018-10-23 DIAGNOSIS — M25.669 DECREASED RANGE OF MOTION OF KNEE, UNSPECIFIED LATERALITY: ICD-10-CM

## 2018-10-23 DIAGNOSIS — R53.1 DECREASED STRENGTH: ICD-10-CM

## 2018-10-23 DIAGNOSIS — R26.89 DECREASED FUNCTIONAL MOBILITY: ICD-10-CM

## 2018-10-23 NOTE — PROGRESS NOTES
Face to Face PTA Conference performed with Frederick Schmidt PTA, Areli Cartwright PTA, Kevin Burgess PTA, Sung Goff PTA regarding patient's current status, overall progress, and plan of care    Dora Telles, PT    Face to face meeting completed with Dora Telles  PT regarding current status and progress of   Deepa N [facility] .  Sung Goff, PTA     Face to face meeting completed with Dora Telles PT regarding current status and progress of   [facility]a N [facility] .  Li Schmidt, PTA      Face to face meeting completed with Dora Telles PT regarding current status and progress of   [facility]a N [facility] .  Kevin Burgess, MARLEN     Face to face meeting completed with Dora Telles PT regarding current status and progress of   [facility]a N [facility] .  Areli Cartwright, PTA

## 2018-10-29 ENCOUNTER — PROCEDURE VISIT (OUTPATIENT)
Dept: MATERNAL FETAL MEDICINE | Facility: HOSPITAL | Age: 23
End: 2018-10-29
Attending: OBSTETRICS & GYNECOLOGY
Payer: MEDICAID

## 2018-10-29 VITALS — SYSTOLIC BLOOD PRESSURE: 112 MMHG | DIASTOLIC BLOOD PRESSURE: 76 MMHG | WEIGHT: 198 LBS | BODY MASS INDEX: 31.01 KG/M2

## 2018-10-29 DIAGNOSIS — Z34.81 ENCOUNTER FOR SUPERVISION OF OTHER NORMAL PREGNANCY IN FIRST TRIMESTER: ICD-10-CM

## 2018-10-29 DIAGNOSIS — Z36.82 ENCOUNTER FOR NUCHAL TRANSLUCENCY TESTING: Primary | ICD-10-CM

## 2018-10-29 PROCEDURE — 76813 OB US NUCHAL MEAS 1 GEST: CPT | Mod: 26,,, | Performed by: OBSTETRICS & GYNECOLOGY

## 2018-10-29 PROCEDURE — 76813 OB US NUCHAL MEAS 1 GEST: CPT

## 2018-10-29 PROCEDURE — 99499 UNLISTED E&M SERVICE: CPT | Mod: ,,, | Performed by: OBSTETRICS & GYNECOLOGY

## 2018-11-07 ENCOUNTER — PATIENT MESSAGE (OUTPATIENT)
Dept: OBSTETRICS AND GYNECOLOGY | Facility: CLINIC | Age: 23
End: 2018-11-07

## 2018-11-27 ENCOUNTER — ROUTINE PRENATAL (OUTPATIENT)
Dept: OBSTETRICS AND GYNECOLOGY | Facility: CLINIC | Age: 23
End: 2018-11-27
Payer: MEDICAID

## 2018-11-27 ENCOUNTER — LAB VISIT (OUTPATIENT)
Dept: LAB | Facility: HOSPITAL | Age: 23
End: 2018-11-27
Attending: OBSTETRICS & GYNECOLOGY
Payer: MEDICAID

## 2018-11-27 VITALS
WEIGHT: 207.88 LBS | DIASTOLIC BLOOD PRESSURE: 76 MMHG | SYSTOLIC BLOOD PRESSURE: 106 MMHG | BODY MASS INDEX: 32.56 KG/M2

## 2018-11-27 DIAGNOSIS — Z34.82 ENCOUNTER FOR SUPERVISION OF OTHER NORMAL PREGNANCY IN SECOND TRIMESTER: ICD-10-CM

## 2018-11-27 DIAGNOSIS — Z3A.16 16 WEEKS GESTATION OF PREGNANCY: ICD-10-CM

## 2018-11-27 DIAGNOSIS — Z34.82 ENCOUNTER FOR SUPERVISION OF OTHER NORMAL PREGNANCY IN SECOND TRIMESTER: Primary | ICD-10-CM

## 2018-11-27 PROCEDURE — 99999 PR PBB SHADOW E&M-EST. PATIENT-LVL II: CPT | Mod: PBBFAC,,, | Performed by: OBSTETRICS & GYNECOLOGY

## 2018-11-27 PROCEDURE — 81511 FTL CGEN ABNOR FOUR ANAL: CPT

## 2018-11-27 PROCEDURE — 99212 OFFICE O/P EST SF 10 MIN: CPT | Mod: PBBFAC,TH,PO | Performed by: OBSTETRICS & GYNECOLOGY

## 2018-11-27 PROCEDURE — 36415 COLL VENOUS BLD VENIPUNCTURE: CPT

## 2018-11-27 PROCEDURE — 99213 OFFICE O/P EST LOW 20 MIN: CPT | Mod: TH,S$PBB,, | Performed by: OBSTETRICS & GYNECOLOGY

## 2018-11-27 NOTE — PROGRESS NOTES
Nausea resolved.    AP: 24 yo  female @ 16.2 wks by LMP c/w 7.3 wk sono (EDC 19) who presents for f/u OB visit.    1) SIUP  -s/p dating scan  -Rh positive  -pap from 2017 wnl  -desires genetic testing: scheduled    2) PP  Considering BTL    F/u in 4 wks OB visit, anatomy scan    candace luke MD

## 2018-11-27 NOTE — LETTER
[place] [facility]  [address] [place] [address], [address]  5th Floor [facility]  [place] [address]  Phone: [phone] November 27, 2018     Patient: Deepa Walls   YOB: 1995   Date of Visit: 11/27/2018       To Whom It May Concern:    My patient is currently pregnant with a due date of May 12, 2019.    If you have any questions or concerns, please don't hesitate to contact my office.    Sincerely,        Geronimo Manzo MD

## 2018-11-29 LAB
# FETUSES US: NORMAL
AFP MOM SERPL: 1.53
AFP SERPL-MCNC: 44 NG/ML
AGE AT DELIVERY: 23
B-HCG MOM SERPL: 0.91
B-HCG SERPL-ACNC: 23.9 IU/ML
COLLECT DATE BLD: NORMAL
COLLECT DATE: NORMAL
FET NASAL BONE LENGTH US.MEAS: NORMAL MM
FET NUCHAL FOLD MOM THICKNESS US.MEAS: 1.33
FET NUCHAL FOLD THICKNESS US.MEAS: 1.5 MM
FET TS 21 RISK FROM MAT AGE: NORMAL
GA (DAYS): 2 D
GA (WEEKS): 12 WK
GA METHOD: NORMAL
GEST. AGE (DAYS) 2ND SAMPLE (SS2): 3
GEST. AGE (WKS) 2ND SAMPLE (SS2): 16
IDDM PATIENT QL: NORMAL
INHIBIN A MOM SERPL: 1.37
INHIBIN A SERPL-MCNC: 192.8 PG/ML
INTEGRATED SCN PATIENT-IMP: NEGATIVE
PAPP-A MOM SERPL: 0.52
PAPP-A SERPL-MCNC: 261.5 NG/ML
SEQUENTIAL SCREEN PART 2 INTERP: NORMAL
TS 18 RISK FETUS: NORMAL
TS 21 RISK FETUS: NORMAL
U ESTRIOL MOM SERPL: 0.7
U ESTRIOL SERPL-MCNC: 0.57 NG/ML

## 2018-12-13 ENCOUNTER — PATIENT MESSAGE (OUTPATIENT)
Dept: OBSTETRICS AND GYNECOLOGY | Facility: CLINIC | Age: 23
End: 2018-12-13

## 2018-12-13 NOTE — TELEPHONE ENCOUNTER
Hi doctor Jovany I was wondering if it was ok for me to get flu vaccine for pre employment while pregnant? If yes, can you send me a letter so I can print it and take it to the facility? They asked me for my doctor to clear me because of being pregnant.

## 2018-12-27 ENCOUNTER — PROCEDURE VISIT (OUTPATIENT)
Dept: OBSTETRICS AND GYNECOLOGY | Facility: CLINIC | Age: 23
End: 2018-12-27
Payer: MEDICAID

## 2018-12-27 ENCOUNTER — ROUTINE PRENATAL (OUTPATIENT)
Dept: OBSTETRICS AND GYNECOLOGY | Facility: CLINIC | Age: 23
End: 2018-12-27
Payer: MEDICAID

## 2018-12-27 VITALS
WEIGHT: 207.88 LBS | BODY MASS INDEX: 32.56 KG/M2 | DIASTOLIC BLOOD PRESSURE: 60 MMHG | SYSTOLIC BLOOD PRESSURE: 122 MMHG

## 2018-12-27 DIAGNOSIS — Z3A.16 16 WEEKS GESTATION OF PREGNANCY: ICD-10-CM

## 2018-12-27 DIAGNOSIS — Z34.82 ENCOUNTER FOR SUPERVISION OF OTHER NORMAL PREGNANCY IN SECOND TRIMESTER: ICD-10-CM

## 2018-12-27 DIAGNOSIS — Z34.82 ENCOUNTER FOR SUPERVISION OF OTHER NORMAL PREGNANCY IN SECOND TRIMESTER: Primary | ICD-10-CM

## 2018-12-27 DIAGNOSIS — Z36.3 ANTENATAL SCREENING FOR MALFORMATION USING ULTRASONICS: Primary | ICD-10-CM

## 2018-12-27 DIAGNOSIS — Z3A.20 20 WEEKS GESTATION OF PREGNANCY: ICD-10-CM

## 2018-12-27 PROCEDURE — 76805 OB US >/= 14 WKS SNGL FETUS: CPT | Mod: 26,S$PBB,, | Performed by: OBSTETRICS & GYNECOLOGY

## 2018-12-27 PROCEDURE — 99213 OFFICE O/P EST LOW 20 MIN: CPT | Mod: TH,S$PBB,25, | Performed by: OBSTETRICS & GYNECOLOGY

## 2018-12-27 PROCEDURE — 99999 PR PBB SHADOW E&M-EST. PATIENT-LVL II: CPT | Mod: PBBFAC,,, | Performed by: OBSTETRICS & GYNECOLOGY

## 2018-12-27 PROCEDURE — 76805 OB US >/= 14 WKS SNGL FETUS: CPT | Mod: PBBFAC,PO

## 2018-12-27 PROCEDURE — 99212 OFFICE O/P EST SF 10 MIN: CPT | Mod: PBBFAC,TH,PO,25 | Performed by: OBSTETRICS & GYNECOLOGY

## 2018-12-27 NOTE — PROGRESS NOTES
No complaints today.    AP: 24 yo  female @ 20.4 wks by LMP c/w 7.3 wk sono (EDC 19) who presents for f/u OB visit.     1) SIUP (it's a boy)  -suboptimal view of fetal heart  -Rh positive  -pap from 2017 wnl  -genetic testing: negative  -declines circ  -consents for vag/blood/BTL signed 18    2) PP  Wants BTL (medicaid tubal form signed 18)    F/u in 4 wks OB visit, anatomy scan, 1 hr GTT    candace luke MD

## 2019-01-23 ENCOUNTER — ROUTINE PRENATAL (OUTPATIENT)
Dept: OBSTETRICS AND GYNECOLOGY | Facility: CLINIC | Age: 24
End: 2019-01-23
Payer: MEDICAID

## 2019-01-23 ENCOUNTER — PROCEDURE VISIT (OUTPATIENT)
Dept: OBSTETRICS AND GYNECOLOGY | Facility: CLINIC | Age: 24
End: 2019-01-23
Payer: MEDICAID

## 2019-01-23 ENCOUNTER — LAB VISIT (OUTPATIENT)
Dept: LAB | Facility: HOSPITAL | Age: 24
End: 2019-01-23
Attending: OBSTETRICS & GYNECOLOGY
Payer: MEDICAID

## 2019-01-23 VITALS
BODY MASS INDEX: 32.56 KG/M2 | DIASTOLIC BLOOD PRESSURE: 62 MMHG | SYSTOLIC BLOOD PRESSURE: 108 MMHG | WEIGHT: 207.88 LBS

## 2019-01-23 DIAGNOSIS — Z3A.24 24 WEEKS GESTATION OF PREGNANCY: ICD-10-CM

## 2019-01-23 DIAGNOSIS — Z34.82 ENCOUNTER FOR SUPERVISION OF OTHER NORMAL PREGNANCY IN SECOND TRIMESTER: ICD-10-CM

## 2019-01-23 DIAGNOSIS — Z3A.20 20 WEEKS GESTATION OF PREGNANCY: ICD-10-CM

## 2019-01-23 DIAGNOSIS — Z34.82 ENCOUNTER FOR SUPERVISION OF OTHER NORMAL PREGNANCY IN SECOND TRIMESTER: Primary | ICD-10-CM

## 2019-01-23 LAB
BASOPHILS # BLD AUTO: 0.01 K/UL
BASOPHILS NFR BLD: 0.1 %
DIFFERENTIAL METHOD: ABNORMAL
EOSINOPHIL # BLD AUTO: 0.2 K/UL
EOSINOPHIL NFR BLD: 1.8 %
ERYTHROCYTE [DISTWIDTH] IN BLOOD BY AUTOMATED COUNT: 12.5 %
GLUCOSE SERPL-MCNC: 83 MG/DL
HCT VFR BLD AUTO: 32.9 %
HGB BLD-MCNC: 10.6 G/DL
LYMPHOCYTES # BLD AUTO: 2.6 K/UL
LYMPHOCYTES NFR BLD: 25 %
MCH RBC QN AUTO: 28.9 PG
MCHC RBC AUTO-ENTMCNC: 32.2 G/DL
MCV RBC AUTO: 90 FL
MONOCYTES # BLD AUTO: 0.8 K/UL
MONOCYTES NFR BLD: 7.1 %
NEUTROPHILS # BLD AUTO: 6.9 K/UL
NEUTROPHILS NFR BLD: 65.7 %
PLATELET # BLD AUTO: 378 K/UL
PMV BLD AUTO: 9.6 FL
RBC # BLD AUTO: 3.67 M/UL
WBC # BLD AUTO: 10.54 K/UL

## 2019-01-23 PROCEDURE — 99213 PR OFFICE/OUTPT VISIT, EST, LEVL III, 20-29 MIN: ICD-10-PCS | Mod: TH,S$PBB,25, | Performed by: OBSTETRICS & GYNECOLOGY

## 2019-01-23 PROCEDURE — 76816 PR  US,PREGNANT UTERUS,F/U,TRANSABD APP: ICD-10-PCS | Mod: 26,S$PBB,, | Performed by: OBSTETRICS & GYNECOLOGY

## 2019-01-23 PROCEDURE — 76816 OB US FOLLOW-UP PER FETUS: CPT | Mod: PBBFAC,PO

## 2019-01-23 PROCEDURE — 85025 COMPLETE CBC W/AUTO DIFF WBC: CPT

## 2019-01-23 PROCEDURE — 99213 OFFICE O/P EST LOW 20 MIN: CPT | Mod: TH,S$PBB,25, | Performed by: OBSTETRICS & GYNECOLOGY

## 2019-01-23 PROCEDURE — 76816 OB US FOLLOW-UP PER FETUS: CPT | Mod: 26,S$PBB,, | Performed by: OBSTETRICS & GYNECOLOGY

## 2019-01-23 PROCEDURE — 36415 COLL VENOUS BLD VENIPUNCTURE: CPT

## 2019-01-23 PROCEDURE — 99999 PR PBB SHADOW E&M-EST. PATIENT-LVL III: CPT | Mod: PBBFAC,,, | Performed by: OBSTETRICS & GYNECOLOGY

## 2019-01-23 PROCEDURE — 82950 GLUCOSE TEST: CPT

## 2019-01-23 PROCEDURE — 99999 PR PBB SHADOW E&M-EST. PATIENT-LVL III: ICD-10-PCS | Mod: PBBFAC,,, | Performed by: OBSTETRICS & GYNECOLOGY

## 2019-01-23 PROCEDURE — 99213 OFFICE O/P EST LOW 20 MIN: CPT | Mod: PBBFAC,TH,PO,25 | Performed by: OBSTETRICS & GYNECOLOGY

## 2019-01-23 NOTE — PROGRESS NOTES
No complaints today. Great fetal movement.    AP: 25 yo  female @ 24.3 wks by LMP c/w 7.3 wk sono (EDC 19) who presents for f/u OB visit.     1) SIUP (it's a boy)  -s/p normal anatomy  -Rh positive  -pap from  wnl  -genetic testing: negative  -declines circ  -consents for vag/blood/BTL signed 18  -1hr GTT today    2) PP  Wants BTL (medicaid tubal form signed 18)    F/u in 4 wks OB visit, wants tdap at next visit    candace luke MD

## 2019-01-24 ENCOUNTER — TELEPHONE (OUTPATIENT)
Dept: OBSTETRICS AND GYNECOLOGY | Facility: CLINIC | Age: 24
End: 2019-01-24

## 2019-01-24 NOTE — TELEPHONE ENCOUNTER
----- Message from Geronimo Manzo MD sent at 1/24/2019 12:54 PM CST -----  Inform patient that her 1 hr glucose test was normal

## 2019-01-25 ENCOUNTER — TELEPHONE (OUTPATIENT)
Dept: OBSTETRICS AND GYNECOLOGY | Facility: CLINIC | Age: 24
End: 2019-01-25

## 2019-01-25 NOTE — TELEPHONE ENCOUNTER
[facility] letter that was requested to be left at the  was never picked up.  I will give it to her at her next appt.

## 2019-02-19 ENCOUNTER — CLINICAL SUPPORT (OUTPATIENT)
Dept: OBSTETRICS AND GYNECOLOGY | Facility: CLINIC | Age: 24
End: 2019-02-19
Payer: MEDICAID

## 2019-02-19 ENCOUNTER — ROUTINE PRENATAL (OUTPATIENT)
Dept: OBSTETRICS AND GYNECOLOGY | Facility: CLINIC | Age: 24
End: 2019-02-19
Payer: MEDICAID

## 2019-02-19 VITALS
WEIGHT: 212.94 LBS | DIASTOLIC BLOOD PRESSURE: 54 MMHG | SYSTOLIC BLOOD PRESSURE: 120 MMHG | BODY MASS INDEX: 33.35 KG/M2

## 2019-02-19 DIAGNOSIS — Z34.83 ENCOUNTER FOR SUPERVISION OF OTHER NORMAL PREGNANCY IN THIRD TRIMESTER: Primary | ICD-10-CM

## 2019-02-19 DIAGNOSIS — Z23 NEED FOR DIPHTHERIA-TETANUS-PERTUSSIS (TDAP) VACCINE: ICD-10-CM

## 2019-02-19 DIAGNOSIS — Z3A.28 28 WEEKS GESTATION OF PREGNANCY: ICD-10-CM

## 2019-02-19 PROCEDURE — 99212 OFFICE O/P EST SF 10 MIN: CPT | Mod: PBBFAC,TH,PO,25 | Performed by: OBSTETRICS & GYNECOLOGY

## 2019-02-19 PROCEDURE — 99213 PR OFFICE/OUTPT VISIT, EST, LEVL III, 20-29 MIN: ICD-10-PCS | Mod: TH,S$PBB,, | Performed by: OBSTETRICS & GYNECOLOGY

## 2019-02-19 PROCEDURE — 99213 OFFICE O/P EST LOW 20 MIN: CPT | Mod: TH,S$PBB,, | Performed by: OBSTETRICS & GYNECOLOGY

## 2019-02-19 PROCEDURE — 99999 PR PBB SHADOW E&M-EST. PATIENT-LVL II: ICD-10-PCS | Mod: PBBFAC,,, | Performed by: OBSTETRICS & GYNECOLOGY

## 2019-02-19 PROCEDURE — 99999 PR PBB SHADOW E&M-EST. PATIENT-LVL II: CPT | Mod: PBBFAC,,, | Performed by: OBSTETRICS & GYNECOLOGY

## 2019-02-19 PROCEDURE — 90471 IMMUNIZATION ADMIN: CPT | Mod: PBBFAC,PO

## 2019-02-19 NOTE — PROGRESS NOTES
No complaints today. Great fetal movement.    AP: 25 yo  female @ 28.2 wks by LMP c/w 7.3 wk sono (EDC 19) who presents for f/u OB visit.     1) SIUP (it's a boy)  -s/p normal anatomy  -Rh positive  -pap from 2017 wnl  -genetic testing: negative  -declines circ  -consents for vag/blood/BTL signed 18  -1hr GTT wnl    2) PP  Wants BTL (medicaid tubal form signed 18)    F/u in 4 wks OB visit    candace luke MD

## 2019-02-19 NOTE — PROGRESS NOTES
2/19/19 at 0849 administered 0.5 ml of Adacel (Tdap) to L deltoid.   Pt tolerated well.   Pt advised to wait 15 minutes before leaving the office.   Pt verbalized understanding.   Pt received VIS.  Lot: [id number]  Exp: 12/15/20  Man: [facility]

## 2019-03-19 ENCOUNTER — ROUTINE PRENATAL (OUTPATIENT)
Dept: OBSTETRICS AND GYNECOLOGY | Facility: CLINIC | Age: 24
End: 2019-03-19
Payer: MEDICAID

## 2019-03-19 VITALS
BODY MASS INDEX: 33.84 KG/M2 | WEIGHT: 216.06 LBS | SYSTOLIC BLOOD PRESSURE: 130 MMHG | DIASTOLIC BLOOD PRESSURE: 64 MMHG

## 2019-03-19 DIAGNOSIS — Z3A.32 32 WEEKS GESTATION OF PREGNANCY: ICD-10-CM

## 2019-03-19 DIAGNOSIS — Z34.83 ENCOUNTER FOR SUPERVISION OF OTHER NORMAL PREGNANCY IN THIRD TRIMESTER: Primary | ICD-10-CM

## 2019-03-19 PROCEDURE — 99213 OFFICE O/P EST LOW 20 MIN: CPT | Mod: TH,S$PBB,, | Performed by: OBSTETRICS & GYNECOLOGY

## 2019-03-19 PROCEDURE — 99999 PR PBB SHADOW E&M-EST. PATIENT-LVL II: ICD-10-PCS | Mod: PBBFAC,,, | Performed by: OBSTETRICS & GYNECOLOGY

## 2019-03-19 PROCEDURE — 99213 PR OFFICE/OUTPT VISIT, EST, LEVL III, 20-29 MIN: ICD-10-PCS | Mod: TH,S$PBB,, | Performed by: OBSTETRICS & GYNECOLOGY

## 2019-03-19 PROCEDURE — 99999 PR PBB SHADOW E&M-EST. PATIENT-LVL II: CPT | Mod: PBBFAC,,, | Performed by: OBSTETRICS & GYNECOLOGY

## 2019-03-19 PROCEDURE — 99212 OFFICE O/P EST SF 10 MIN: CPT | Mod: PBBFAC,TH,PO | Performed by: OBSTETRICS & GYNECOLOGY

## 2019-03-19 NOTE — PROGRESS NOTES
No complaints today. Great fetal movement.  Timothy zafar resolved.    AP: 23 yo  female @ 32.2 wks by LMP c/w 7.3 wk sono (EDC 19) who presents for f/u OB visit.     1) SIUP (it's a boy)  -s/p normal anatomy  -Rh positive  -pap from 2017 wnl  -genetic testing: negative  -declines circ  -consents for vag/blood/BTL signed 18  -1hr GTT wnl    2) PP  Wants BTL (medicaid tubal form signed 18)  -s/p tdap    F/u in 3 wks OB visit, growth scan ordered    candace luke MD

## 2019-03-28 ENCOUNTER — PATIENT MESSAGE (OUTPATIENT)
Dept: OBSTETRICS AND GYNECOLOGY | Facility: CLINIC | Age: 24
End: 2019-03-28

## 2019-04-09 ENCOUNTER — PROCEDURE VISIT (OUTPATIENT)
Dept: OBSTETRICS AND GYNECOLOGY | Facility: CLINIC | Age: 24
End: 2019-04-09
Payer: MEDICAID

## 2019-04-09 ENCOUNTER — ROUTINE PRENATAL (OUTPATIENT)
Dept: OBSTETRICS AND GYNECOLOGY | Facility: CLINIC | Age: 24
End: 2019-04-09
Payer: MEDICAID

## 2019-04-09 ENCOUNTER — LAB VISIT (OUTPATIENT)
Dept: LAB | Facility: HOSPITAL | Age: 24
End: 2019-04-09
Attending: OBSTETRICS & GYNECOLOGY
Payer: MEDICAID

## 2019-04-09 VITALS
SYSTOLIC BLOOD PRESSURE: 120 MMHG | WEIGHT: 218.94 LBS | DIASTOLIC BLOOD PRESSURE: 58 MMHG | BODY MASS INDEX: 34.29 KG/M2

## 2019-04-09 DIAGNOSIS — Z3A.32 32 WEEKS GESTATION OF PREGNANCY: ICD-10-CM

## 2019-04-09 DIAGNOSIS — Z34.83 ENCOUNTER FOR SUPERVISION OF OTHER NORMAL PREGNANCY IN THIRD TRIMESTER: ICD-10-CM

## 2019-04-09 DIAGNOSIS — Z34.83 ENCOUNTER FOR SUPERVISION OF OTHER NORMAL PREGNANCY IN THIRD TRIMESTER: Primary | ICD-10-CM

## 2019-04-09 DIAGNOSIS — Z3A.35 35 WEEKS GESTATION OF PREGNANCY: ICD-10-CM

## 2019-04-09 DIAGNOSIS — Z36.89 ENCOUNTER FOR ULTRASOUND TO CHECK FETAL GROWTH: ICD-10-CM

## 2019-04-09 LAB
BASOPHILS # BLD AUTO: 0.01 K/UL (ref 0–0.2)
BASOPHILS NFR BLD: 0.1 % (ref 0–1.9)
DIFFERENTIAL METHOD: ABNORMAL
EOSINOPHIL # BLD AUTO: 0.3 K/UL (ref 0–0.5)
EOSINOPHIL NFR BLD: 2.6 % (ref 0–8)
ERYTHROCYTE [DISTWIDTH] IN BLOOD BY AUTOMATED COUNT: 12.9 % (ref 11.5–14.5)
HCT VFR BLD AUTO: 31.7 % (ref 37–48.5)
HGB BLD-MCNC: 10.5 G/DL (ref 12–16)
LYMPHOCYTES # BLD AUTO: 2 K/UL (ref 1–4.8)
LYMPHOCYTES NFR BLD: 19.9 % (ref 18–48)
MCH RBC QN AUTO: 29.5 PG (ref 27–31)
MCHC RBC AUTO-ENTMCNC: 33.1 G/DL (ref 32–36)
MCV RBC AUTO: 89 FL (ref 82–98)
MONOCYTES # BLD AUTO: 1 K/UL (ref 0.3–1)
MONOCYTES NFR BLD: 9.8 % (ref 4–15)
NEUTROPHILS # BLD AUTO: 6.9 K/UL (ref 1.8–7.7)
NEUTROPHILS NFR BLD: 67.3 % (ref 38–73)
PLATELET # BLD AUTO: 273 K/UL (ref 150–350)
PMV BLD AUTO: 9.3 FL (ref 9.2–12.9)
RBC # BLD AUTO: 3.56 M/UL (ref 4–5.4)
WBC # BLD AUTO: 10.21 K/UL (ref 3.9–12.7)

## 2019-04-09 PROCEDURE — 85025 COMPLETE CBC W/AUTO DIFF WBC: CPT

## 2019-04-09 PROCEDURE — 76816 PR  US,PREGNANT UTERUS,F/U,TRANSABD APP: ICD-10-PCS | Mod: 26,S$PBB,, | Performed by: OBSTETRICS & GYNECOLOGY

## 2019-04-09 PROCEDURE — 99999 PR PBB SHADOW E&M-EST. PATIENT-LVL II: ICD-10-PCS | Mod: PBBFAC,,, | Performed by: OBSTETRICS & GYNECOLOGY

## 2019-04-09 PROCEDURE — 87081 CULTURE SCREEN ONLY: CPT

## 2019-04-09 PROCEDURE — 86703 HIV-1/HIV-2 1 RESULT ANTBDY: CPT

## 2019-04-09 PROCEDURE — 99213 OFFICE O/P EST LOW 20 MIN: CPT | Mod: TH,S$PBB,, | Performed by: OBSTETRICS & GYNECOLOGY

## 2019-04-09 PROCEDURE — 87491 CHLMYD TRACH DNA AMP PROBE: CPT

## 2019-04-09 PROCEDURE — 99213 PR OFFICE/OUTPT VISIT, EST, LEVL III, 20-29 MIN: ICD-10-PCS | Mod: TH,S$PBB,, | Performed by: OBSTETRICS & GYNECOLOGY

## 2019-04-09 PROCEDURE — 99999 PR PBB SHADOW E&M-EST. PATIENT-LVL II: CPT | Mod: PBBFAC,,, | Performed by: OBSTETRICS & GYNECOLOGY

## 2019-04-09 PROCEDURE — 36415 COLL VENOUS BLD VENIPUNCTURE: CPT

## 2019-04-09 PROCEDURE — 86592 SYPHILIS TEST NON-TREP QUAL: CPT

## 2019-04-09 PROCEDURE — 76816 OB US FOLLOW-UP PER FETUS: CPT | Mod: 26,S$PBB,, | Performed by: OBSTETRICS & GYNECOLOGY

## 2019-04-09 PROCEDURE — 76816 OB US FOLLOW-UP PER FETUS: CPT | Mod: PBBFAC,PO | Performed by: OBSTETRICS & GYNECOLOGY

## 2019-04-09 PROCEDURE — 99212 OFFICE O/P EST SF 10 MIN: CPT | Mod: PBBFAC,TH,PO,25 | Performed by: OBSTETRICS & GYNECOLOGY

## 2019-04-09 NOTE — PROGRESS NOTES
No complaints today. Great fetal movement. [place] zafar ctxs  cervix FT.  S/p normal growth scan on 19    AP: 25 yo  female @ 35.2 wks by LMP c/w 7.3 wk sono (EDC 19) who presents for f/u OB visit.     1) SIUP (it's a boy)  -s/p normal anatomy  -Rh positive  -pap from 2017 wnl  -genetic testing: negative  -declines circ  -consents for vag/blood/BTL signed 18  -1hr GTT wnl  -3rd trimester labs today    2) PP  Wants BTL (medicaid tubal form signed 18)  -s/p tdap    F/u in 2 wks OB visit    candace luke MD

## 2019-04-09 NOTE — PROCEDURES
Procedures   Obstetrical ultrasound completed today.  See report in imaging section of [facility].

## 2019-04-10 LAB
C TRACH DNA SPEC QL NAA+PROBE: NOT DETECTED
HIV 1+2 AB+HIV1 P24 AG SERPL QL IA: NEGATIVE
N GONORRHOEA DNA SPEC QL NAA+PROBE: NOT DETECTED
RPR SER QL: NORMAL

## 2019-04-12 LAB — BACTERIA SPEC AEROBE CULT: NORMAL

## 2019-04-24 ENCOUNTER — ROUTINE PRENATAL (OUTPATIENT)
Dept: OBSTETRICS AND GYNECOLOGY | Facility: CLINIC | Age: 24
End: 2019-04-24
Payer: MEDICAID

## 2019-04-24 VITALS
DIASTOLIC BLOOD PRESSURE: 78 MMHG | BODY MASS INDEX: 35.05 KG/M2 | SYSTOLIC BLOOD PRESSURE: 110 MMHG | WEIGHT: 223.75 LBS

## 2019-04-24 DIAGNOSIS — Z3A.37 37 WEEKS GESTATION OF PREGNANCY: ICD-10-CM

## 2019-04-24 DIAGNOSIS — Z34.83 ENCOUNTER FOR SUPERVISION OF OTHER NORMAL PREGNANCY IN THIRD TRIMESTER: Primary | ICD-10-CM

## 2019-04-24 PROCEDURE — 99999 PR PBB SHADOW E&M-EST. PATIENT-LVL II: ICD-10-PCS | Mod: PBBFAC,,, | Performed by: OBSTETRICS & GYNECOLOGY

## 2019-04-24 PROCEDURE — 99999 PR PBB SHADOW E&M-EST. PATIENT-LVL II: CPT | Mod: PBBFAC,,, | Performed by: OBSTETRICS & GYNECOLOGY

## 2019-04-24 PROCEDURE — 99212 OFFICE O/P EST SF 10 MIN: CPT | Mod: PBBFAC,TH,PO | Performed by: OBSTETRICS & GYNECOLOGY

## 2019-04-24 PROCEDURE — 99213 OFFICE O/P EST LOW 20 MIN: CPT | Mod: TH,S$PBB,, | Performed by: OBSTETRICS & GYNECOLOGY

## 2019-04-24 PROCEDURE — 99213 PR OFFICE/OUTPT VISIT, EST, LEVL III, 20-29 MIN: ICD-10-PCS | Mod: TH,S$PBB,, | Performed by: OBSTETRICS & GYNECOLOGY

## 2019-04-24 NOTE — PROGRESS NOTES
No complaints today. Great fetal movement. [place] zafar ctxs  cervix 1.5cm/0/04    S/p normal growth scan on 19    AP: 23 yo  female @ 37.3 wks by LMP c/w 7.3 wk sono (EDC 19) who presents for f/u OB visit.     1) SIUP (it's a boy)  -s/p normal anatomy  -Rh positive  -pap from 2017 wnl  -genetic testing: negative  -declines circ  -consents for vag/blood/BTL signed 18  -1hr GTT wnl  -GBS neg    2) PP  Wants BTL (medicaid tubal form signed 18)  -s/p tdap    F/u in 1 wks OB visit    candace luke MD

## 2019-05-02 ENCOUNTER — ROUTINE PRENATAL (OUTPATIENT)
Dept: OBSTETRICS AND GYNECOLOGY | Facility: CLINIC | Age: 24
End: 2019-05-02
Payer: MEDICAID

## 2019-05-02 VITALS
WEIGHT: 222.88 LBS | SYSTOLIC BLOOD PRESSURE: 119 MMHG | BODY MASS INDEX: 34.91 KG/M2 | DIASTOLIC BLOOD PRESSURE: 50 MMHG

## 2019-05-02 DIAGNOSIS — R10.9 ABDOMINAL PAIN AFFECTING PREGNANCY: ICD-10-CM

## 2019-05-02 DIAGNOSIS — Z34.83 ENCOUNTER FOR SUPERVISION OF OTHER NORMAL PREGNANCY IN THIRD TRIMESTER: Primary | ICD-10-CM

## 2019-05-02 DIAGNOSIS — Z3A.38 38 WEEKS GESTATION OF PREGNANCY: ICD-10-CM

## 2019-05-02 DIAGNOSIS — O26.899 ABDOMINAL PAIN AFFECTING PREGNANCY: ICD-10-CM

## 2019-05-02 PROCEDURE — 99212 OFFICE O/P EST SF 10 MIN: CPT | Mod: PBBFAC,TH,PO | Performed by: OBSTETRICS & GYNECOLOGY

## 2019-05-02 PROCEDURE — 99213 PR OFFICE/OUTPT VISIT, EST, LEVL III, 20-29 MIN: ICD-10-PCS | Mod: TH,S$PBB,, | Performed by: OBSTETRICS & GYNECOLOGY

## 2019-05-02 PROCEDURE — 99213 OFFICE O/P EST LOW 20 MIN: CPT | Mod: TH,S$PBB,, | Performed by: OBSTETRICS & GYNECOLOGY

## 2019-05-02 PROCEDURE — 99999 PR PBB SHADOW E&M-EST. PATIENT-LVL II: CPT | Mod: PBBFAC,,, | Performed by: OBSTETRICS & GYNECOLOGY

## 2019-05-02 PROCEDURE — 99999 PR PBB SHADOW E&M-EST. PATIENT-LVL II: ICD-10-PCS | Mod: PBBFAC,,, | Performed by: OBSTETRICS & GYNECOLOGY

## 2019-05-02 RX ORDER — ONDANSETRON 4 MG/1
8 TABLET, ORALLY DISINTEGRATING ORAL EVERY 8 HOURS PRN
Status: CANCELLED | OUTPATIENT
Start: 2019-05-02

## 2019-05-02 RX ORDER — MISOPROSTOL 100 UG/1
600 TABLET ORAL
Status: CANCELLED | OUTPATIENT
Start: 2019-05-02

## 2019-05-02 RX ORDER — SODIUM CHLORIDE, SODIUM LACTATE, POTASSIUM CHLORIDE, CALCIUM CHLORIDE 600; 310; 30; 20 MG/100ML; MG/100ML; MG/100ML; MG/100ML
INJECTION, SOLUTION INTRAVENOUS CONTINUOUS
Status: CANCELLED | OUTPATIENT
Start: 2019-05-02

## 2019-05-02 NOTE — PROGRESS NOTES
No complaints today. Great fetal movement. [place] zafar ctxs  cervix 2 cm/0/-4   S/p normal growth scan on 19    AP: 23 yo  female @ 38.4 wks by LMP c/w 7.3 wk sono (EDC 19) who presents for f/u OB visit.     1) SIUP (it's a boy)  -s/p normal anatomy  -Rh positive  -pap from 2017 wnl  -genetic testing: negative  -declines circ  -consents for vag/blood/BTL signed 18  -1hr GTT wnl  -GBS neg    2) PP  Wants BTL (medicaid tubal form signed 18)  -s/p tdap    IOL next week, raza luke MD

## 2019-05-08 ENCOUNTER — ANESTHESIA (OUTPATIENT)
Dept: OBSTETRICS AND GYNECOLOGY | Facility: HOSPITAL | Age: 24
End: 2019-05-08
Payer: MEDICAID

## 2019-05-08 ENCOUNTER — ANESTHESIA EVENT (OUTPATIENT)
Dept: OBSTETRICS AND GYNECOLOGY | Facility: HOSPITAL | Age: 24
End: 2019-05-08

## 2019-05-08 ENCOUNTER — ANESTHESIA EVENT (OUTPATIENT)
Dept: OBSTETRICS AND GYNECOLOGY | Facility: HOSPITAL | Age: 24
End: 2019-05-08
Payer: MEDICAID

## 2019-05-08 ENCOUNTER — ANESTHESIA (OUTPATIENT)
Dept: OBSTETRICS AND GYNECOLOGY | Facility: HOSPITAL | Age: 24
End: 2019-05-08

## 2019-05-08 ENCOUNTER — HOSPITAL ENCOUNTER (INPATIENT)
Facility: HOSPITAL | Age: 24
LOS: 2 days | Discharge: HOME | End: 2019-05-10
Attending: OBSTETRICS & GYNECOLOGY | Admitting: OBSTETRICS & GYNECOLOGY
Payer: MEDICAID

## 2019-05-08 DIAGNOSIS — O26.899 ABDOMINAL PAIN AFFECTING PREGNANCY: ICD-10-CM

## 2019-05-08 DIAGNOSIS — Z98.51 S/P TUBAL LIGATION: ICD-10-CM

## 2019-05-08 DIAGNOSIS — R10.9 ABDOMINAL PAIN AFFECTING PREGNANCY: ICD-10-CM

## 2019-05-08 LAB
ABO + RH BLD: NORMAL
BASOPHILS # BLD AUTO: 0.01 K/UL (ref 0–0.2)
BASOPHILS NFR BLD: 0.1 % (ref 0–1.9)
BLD GP AB SCN CELLS X3 SERPL QL: NORMAL
DIFFERENTIAL METHOD: ABNORMAL
EOSINOPHIL # BLD AUTO: 0.2 K/UL (ref 0–0.5)
EOSINOPHIL NFR BLD: 1.7 % (ref 0–8)
ERYTHROCYTE [DISTWIDTH] IN BLOOD BY AUTOMATED COUNT: 12.8 % (ref 11.5–14.5)
HCT VFR BLD AUTO: 32.4 % (ref 37–48.5)
HGB BLD-MCNC: 10.5 G/DL (ref 12–16)
LYMPHOCYTES # BLD AUTO: 2.4 K/UL (ref 1–4.8)
LYMPHOCYTES NFR BLD: 23.6 % (ref 18–48)
MCH RBC QN AUTO: 28.6 PG (ref 27–31)
MCHC RBC AUTO-ENTMCNC: 32.4 G/DL (ref 32–36)
MCV RBC AUTO: 88 FL (ref 82–98)
MONOCYTES # BLD AUTO: 1 K/UL (ref 0.3–1)
MONOCYTES NFR BLD: 10.1 % (ref 4–15)
NEUTROPHILS # BLD AUTO: 6.5 K/UL (ref 1.8–7.7)
NEUTROPHILS NFR BLD: 64.4 % (ref 38–73)
PLATELET # BLD AUTO: 304 K/UL (ref 150–350)
PMV BLD AUTO: 9.9 FL (ref 9.2–12.9)
RBC # BLD AUTO: 3.67 M/UL (ref 4–5.4)
WBC # BLD AUTO: 10.11 K/UL (ref 3.9–12.7)

## 2019-05-08 PROCEDURE — 86850 RBC ANTIBODY SCREEN: CPT

## 2019-05-08 PROCEDURE — 72100003 HC LABOR CARE, EA. ADDL. 8 HRS

## 2019-05-08 PROCEDURE — 36415 COLL VENOUS BLD VENIPUNCTURE: CPT

## 2019-05-08 PROCEDURE — 11000001 HC ACUTE MED/SURG PRIVATE ROOM

## 2019-05-08 PROCEDURE — 27800516 HC TRAY, EPIDURAL COMBO: Performed by: STUDENT IN AN ORGANIZED HEALTH CARE EDUCATION/TRAINING PROGRAM

## 2019-05-08 PROCEDURE — 25000003 PHARM REV CODE 250: Performed by: OBSTETRICS & GYNECOLOGY

## 2019-05-08 PROCEDURE — 27200710 HC EPIDURAL INFUSION PUMP SET: Performed by: STUDENT IN AN ORGANIZED HEALTH CARE EDUCATION/TRAINING PROGRAM

## 2019-05-08 PROCEDURE — 85025 COMPLETE CBC W/AUTO DIFF WBC: CPT

## 2019-05-08 PROCEDURE — 25000003 PHARM REV CODE 250: Performed by: ANESTHESIOLOGY

## 2019-05-08 PROCEDURE — 59409 PR OBSTETRICAL CARE,VAG DELIV ONLY: ICD-10-PCS | Mod: GB,,, | Performed by: OBSTETRICS & GYNECOLOGY

## 2019-05-08 PROCEDURE — 59025 FETAL NON-STRESS TEST: CPT

## 2019-05-08 PROCEDURE — 63600175 PHARM REV CODE 636 W HCPCS: Performed by: OBSTETRICS & GYNECOLOGY

## 2019-05-08 PROCEDURE — 27201127 HC VACUUM EXTRACTOR

## 2019-05-08 PROCEDURE — 72100002 HC LABOR CARE, 1ST 8 HOURS

## 2019-05-08 PROCEDURE — 72200006 HC VAGINAL DELIVERY LEVEL III

## 2019-05-08 PROCEDURE — 62326 NJX INTERLAMINAR LMBR/SAC: CPT | Performed by: ANESTHESIOLOGY

## 2019-05-08 PROCEDURE — 51702 INSERT TEMP BLADDER CATH: CPT

## 2019-05-08 PROCEDURE — 59409 OBSTETRICAL CARE: CPT | Mod: GB,,, | Performed by: OBSTETRICS & GYNECOLOGY

## 2019-05-08 RX ORDER — OXYCODONE AND ACETAMINOPHEN 10; 325 MG/1; MG/1
1 TABLET ORAL EVERY 4 HOURS PRN
Status: DISCONTINUED | OUTPATIENT
Start: 2019-05-08 | End: 2019-05-10 | Stop reason: HOSPADM

## 2019-05-08 RX ORDER — ROPIVACAINE HYDROCHLORIDE 2 MG/ML
INJECTION, SOLUTION EPIDURAL; INFILTRATION; PERINEURAL
Status: DISPENSED
Start: 2019-05-08 | End: 2019-05-08

## 2019-05-08 RX ORDER — OXYTOCIN/RINGER'S LACTATE 20/1000 ML
41.7 PLASTIC BAG, INJECTION (ML) INTRAVENOUS CONTINUOUS
Status: DISCONTINUED | OUTPATIENT
Start: 2019-05-08 | End: 2019-05-08

## 2019-05-08 RX ORDER — OXYTOCIN/RINGER'S LACTATE 20/1000 ML
2 PLASTIC BAG, INJECTION (ML) INTRAVENOUS CONTINUOUS
Status: DISCONTINUED | OUTPATIENT
Start: 2019-05-08 | End: 2019-05-08

## 2019-05-08 RX ORDER — ACETAMINOPHEN 325 MG/1
650 TABLET ORAL EVERY 6 HOURS PRN
Status: DISCONTINUED | OUTPATIENT
Start: 2019-05-08 | End: 2019-05-10 | Stop reason: HOSPADM

## 2019-05-08 RX ORDER — OXYTOCIN/RINGER'S LACTATE 20/1000 ML
333 PLASTIC BAG, INJECTION (ML) INTRAVENOUS CONTINUOUS
Status: DISCONTINUED | OUTPATIENT
Start: 2019-05-08 | End: 2019-05-08

## 2019-05-08 RX ORDER — DIPHENHYDRAMINE HYDROCHLORIDE 50 MG/ML
25 INJECTION INTRAMUSCULAR; INTRAVENOUS EVERY 4 HOURS PRN
Status: DISCONTINUED | OUTPATIENT
Start: 2019-05-08 | End: 2019-05-10 | Stop reason: HOSPADM

## 2019-05-08 RX ORDER — ONDANSETRON 8 MG/1
8 TABLET, ORALLY DISINTEGRATING ORAL EVERY 8 HOURS PRN
Status: DISCONTINUED | OUTPATIENT
Start: 2019-05-08 | End: 2019-05-08

## 2019-05-08 RX ORDER — FENTANYL CITRATE 50 UG/ML
INJECTION, SOLUTION INTRAMUSCULAR; INTRAVENOUS
Status: DISPENSED
Start: 2019-05-08 | End: 2019-05-08

## 2019-05-08 RX ORDER — SODIUM CHLORIDE, SODIUM LACTATE, POTASSIUM CHLORIDE, CALCIUM CHLORIDE 600; 310; 30; 20 MG/100ML; MG/100ML; MG/100ML; MG/100ML
INJECTION, SOLUTION INTRAVENOUS CONTINUOUS
Status: DISCONTINUED | OUTPATIENT
Start: 2019-05-08 | End: 2019-05-08

## 2019-05-08 RX ORDER — IBUPROFEN 600 MG/1
600 TABLET ORAL EVERY 6 HOURS
Status: DISCONTINUED | OUTPATIENT
Start: 2019-05-08 | End: 2019-05-10 | Stop reason: HOSPADM

## 2019-05-08 RX ORDER — SODIUM CITRATE AND CITRIC ACID MONOHYDRATE 334; 500 MG/5ML; MG/5ML
30 SOLUTION ORAL ONCE
Status: CANCELLED | OUTPATIENT
Start: 2019-05-08 | End: 2019-05-08

## 2019-05-08 RX ORDER — FAMOTIDINE 10 MG/ML
20 INJECTION INTRAVENOUS ONCE
Status: CANCELLED | OUTPATIENT
Start: 2019-05-08 | End: 2019-05-08

## 2019-05-08 RX ORDER — OXYTOCIN/RINGER'S LACTATE 20/1000 ML
41.65 PLASTIC BAG, INJECTION (ML) INTRAVENOUS CONTINUOUS
Status: ACTIVE | OUTPATIENT
Start: 2019-05-08 | End: 2019-05-08

## 2019-05-08 RX ORDER — OXYTOCIN/RINGER'S LACTATE 20/1000 ML
PLASTIC BAG, INJECTION (ML) INTRAVENOUS
Status: DISPENSED
Start: 2019-05-08 | End: 2019-05-09

## 2019-05-08 RX ORDER — MISOPROSTOL 200 UG/1
600 TABLET ORAL
Status: DISCONTINUED | OUTPATIENT
Start: 2019-05-08 | End: 2019-05-08

## 2019-05-08 RX ORDER — OXYCODONE AND ACETAMINOPHEN 5; 325 MG/1; MG/1
1 TABLET ORAL EVERY 4 HOURS PRN
Status: DISCONTINUED | OUTPATIENT
Start: 2019-05-08 | End: 2019-05-10 | Stop reason: HOSPADM

## 2019-05-08 RX ORDER — ONDANSETRON 8 MG/1
8 TABLET, ORALLY DISINTEGRATING ORAL EVERY 8 HOURS PRN
Status: DISCONTINUED | OUTPATIENT
Start: 2019-05-08 | End: 2019-05-10 | Stop reason: HOSPADM

## 2019-05-08 RX ORDER — SIMETHICONE 80 MG
1 TABLET,CHEWABLE ORAL EVERY 6 HOURS PRN
Status: DISCONTINUED | OUTPATIENT
Start: 2019-05-08 | End: 2019-05-10 | Stop reason: HOSPADM

## 2019-05-08 RX ORDER — BUTALBITAL, ACETAMINOPHEN AND CAFFEINE 50; 325; 40 MG/1; MG/1; MG/1
1 TABLET ORAL EVERY 6 HOURS
Status: DISCONTINUED | OUTPATIENT
Start: 2019-05-08 | End: 2019-05-10 | Stop reason: HOSPADM

## 2019-05-08 RX ADMIN — BUTALBITAL, ACETAMINOPHEN, AND CAFFEINE 1 TABLET: 50; 325; 40 TABLET ORAL at 04:05

## 2019-05-08 RX ADMIN — OXYCODONE AND ACETAMINOPHEN 1 TABLET: 10; 325 TABLET ORAL at 07:05

## 2019-05-08 RX ADMIN — Medication 41.65 MILLI-UNITS/MIN: at 01:05

## 2019-05-08 RX ADMIN — SODIUM CHLORIDE, SODIUM LACTATE, POTASSIUM CHLORIDE, AND CALCIUM CHLORIDE: .6; .31; .03; .02 INJECTION, SOLUTION INTRAVENOUS at 07:05

## 2019-05-08 RX ADMIN — SODIUM CHLORIDE, SODIUM LACTATE, POTASSIUM CHLORIDE, AND CALCIUM CHLORIDE: .6; .31; .03; .02 INJECTION, SOLUTION INTRAVENOUS at 01:05

## 2019-05-08 RX ADMIN — Medication 2 MILLI-UNITS/MIN: at 01:05

## 2019-05-08 RX ADMIN — ONDANSETRON 8 MG: 8 TABLET, ORALLY DISINTEGRATING ORAL at 04:05

## 2019-05-08 RX ADMIN — SODIUM CHLORIDE, SODIUM LACTATE, POTASSIUM CHLORIDE, AND CALCIUM CHLORIDE: .6; .31; .03; .02 INJECTION, SOLUTION INTRAVENOUS at 12:05

## 2019-05-08 RX ADMIN — BUTALBITAL, ACETAMINOPHEN, AND CAFFEINE 1 TABLET: 50; 325; 40 TABLET ORAL at 11:05

## 2019-05-08 RX ADMIN — IBUPROFEN 600 MG: 600 TABLET ORAL at 11:05

## 2019-05-08 RX ADMIN — IBUPROFEN 600 MG: 600 TABLET ORAL at 02:05

## 2019-05-08 RX ADMIN — SODIUM CHLORIDE, SODIUM LACTATE, POTASSIUM CHLORIDE, AND CALCIUM CHLORIDE 500 ML: .6; .31; .03; .02 INJECTION, SOLUTION INTRAVENOUS at 07:05

## 2019-05-08 NOTE — NURSING
Pt removed her bp cuff due to breastfeeding.  bp's wnl.  Will reapply soon.     1515 I&O cath inserted with 750cc clear urine.  pericare done. Underwear applied.     1545  Called and spoke to DR Islas with anesthesia, informed of pt c/o pain to back of neck and increased headache when getting up into wheelchair and light sensitivity.  States rec'd report that pt had a wet tap with epidural placement.  Orders rec'd for fioricet 1 tablet every 6 hours and will assess pt tomorrow for possible blood patch,rb&v.  Called and informed charge nurse IDANIA Nieves of new order to inform IDANIA Vaughan.

## 2019-05-08 NOTE — ANESTHESIA PROCEDURE NOTES
Epidural    Patient location during procedure: OB   Reason for block: primary anesthetic   Diagnosis: labor   Start time: 5/8/2019 7:32 AM  Timeout: 5/8/2019 7:30 AM  End time: 5/8/2019 8:00 AM  Staffing  Anesthesiologist: Jett Marcelo MD  Resident/CRNA: May Reardon MD  Performed: resident/CRNA   Preanesthetic Checklist  Completed: patient identified, site marked, surgical consent, pre-op evaluation, timeout performed, IV checked, risks and benefits discussed, monitors and equipment checked, anesthesia consent given, hand hygiene performed and patient being monitored  Preparation  Patient position: sitting  Prep: ChloraPrep  Patient monitoring: ECG, Pulse Ox and Blood Pressure  Epidural  Skin Anesthetic: lidocaine 1%  Skin Wheal: 3 mL  Administration type: single shot  Approach: midline  Interspace: L3-4    Injection technique: LIN saline  Needle and Epidural Catheter  Needle type: Tuohy   Needle gauge: 17  Needle length: 3.5 inches  Needle insertion depth: 8 cm  Catheter type: springwound  Catheter size: 19 G  Catheter at skin depth: 13 cm  Test dose: 3 mL of lidocaine 1.5% with Epi 1-to-200,000  Additional Documentation: incremental injection and right transient paresthesia  Needle localization: anatomical landmarks and paresthesias  Assessment  Ease of block: moderate  Patient's tolerance of the procedure: comfortable throughout blockInadvertent dural puncture with Tuohy.  Dural puncture not performed with spinal needle

## 2019-05-08 NOTE — H&P
HPI:   Deepa Randall is a 24 y.o. female  at 39.3 EGA who presents here for induction of labor.    ROS:  GENERAL: Denies weight gain or weight loss. Feeling well overall.   SKIN: Denies rash or lesions.   HEAD: Denies head injury or headache.   CHEST: Denies chest pain or shortness of breath.   CARDIOVASCULAR: Denies palpitations or left sided chest pain.   ABDOMEN: No abdominal pain, constipation, diarrhea, nausea, vomiting or rectal bleeding.   URINARY: No frequency, dysuria, hematuria, or burning on urination.  REPRODUCTIVE: no complaints.     Past Medical History:   Diagnosis Date    Asthma     childhood asthma    Ovarian cyst     left     Past Surgical History:   Procedure Laterality Date    RECONSTRUCTION, KNEE, ACL, HAMSTRING AUTOGRAFT Right 2018    Performed by [facility]an [facility] at [facility] OR    RECONSTRUCTION, LIGAMENT, MCL, USING  ACHILLES TENDON ALLOGRAFT Right 2018    Performed by Phill Tran MD at [facility] OR     Family History   Problem Relation Age of Onset    Migraines Mother     Cancer Maternal Aunt      Allergies: Patient has no known allergies.       PE:   Temp:  [97.8 °F (36.6 °C)]   Pulse:  [57-71]   Resp:  [18]   BP: (106-137)/(53-74)   APPEARANCE: Well nourished, well developed, in no acute distress  ABDOMEN:  Gravid.   SVE: 50/-1  FHT: 120bpm, mod dayo, +accels, no decels  [place]: q 2-3 min  AROM clear  Pit @ 14    Lab Results   Component Value Date    WBC 10.11 2019    HGB 10.5 (L) 2019    HCT 32.4 (L) 2019    MCV 88 2019     2019       O POS    Assessment:  IUP 39.3 weeks, IOL    Plan:   Admit to L&D  FHT Cat 1  GBS neg  Plan: continue pitocin  Declines epidural for now  Consents reviewed    DANIELA Manzo MD

## 2019-05-08 NOTE — L&D DELIVERY NOTE
Operative Note    Date: 2019  Time: 12:35 PM  Preoperative Diagnosis: IUP @ 39.3 wks, IOL  Postoperative Diagnosis: same  Procedure: s/p VAVD  Type of Anesthesia: epidural  Surgeon: Geronimo Manzo MD  Specimen/Tissue Removed: intact 3 vessel cord placenta  Estimated Blood Loss: min  Complications: none  Findings: fetal heart tones noted to be 80bpm without recovery to baseline with pushing x 5 min. Kiwi vacuum applied to flexion point at 1+ station.  Head delivered after 3 pulls. No popoffs.  Viable male infant in cephalic presentation with APGARS 9 and 9; Head delivered ANA MARIA; cord doubly clamped then cut by father of the baby. Wendell placed on mother's abdomen. Intact 3 vessel cord placenta delivered with gentle manual traction. Uterine fundus massaged and noted to be firm.  Perineum evaluated and no tears appreciated. Hemostasis achieved. Mother and  baby healthy, doing well.    JENNIFER Manzo MD

## 2019-05-08 NOTE — PLAN OF CARE
Problem: Adult Inpatient Plan of Care  Goal: Plan of Care Review  Outcome: Ongoing (interventions implemented as appropriate)  POC reviewed with patient. Pt tolerating regular diet, ambulating independently, and voiding spontaneously. VS remain stable. Afebrile. Pain being controlled with ordered medications. No acute distress noted. Will continue to monitor.

## 2019-05-08 NOTE — ANESTHESIA PREPROCEDURE EVALUATION
05/08/2019  Deepa Randall is a 24 y.o., female no pmh requesting epidural for labor analgesia.    NKDA    Past Medical History:   Diagnosis Date    Asthma     childhood asthma    Ovarian cyst     left     Past Surgical History:   Procedure Laterality Date    RECONSTRUCTION, KNEE, ACL, HAMSTRING AUTOGRAFT Right 8/30/2018    Performed by Phill Tran MD at [facility] OR    RECONSTRUCTION, LIGAMENT, MCL, USING  ACHILLES TENDON ALLOGRAFT Right 8/30/2018    Performed by Phill Tran MD at [facility] OR     Recent Labs   Lab 05/08/19  0035   WBC 10.11   RBC 3.67*   HGB 10.5*   HCT 32.4*      MCV 88   MCH 28.6   MCHC 32.4         Anesthesia Evaluation    I have reviewed the Patient Summary Reports.        Review of Systems  Anesthesia Hx:  No problems with previous Anesthesia   Denies Personal Hx of Anesthesia complications.   Hematology/Oncology:     Oncology Normal    -- Anemia:   EENT/Dental:EENT/Dental Normal   Cardiovascular:  Cardiovascular Normal Exercise tolerance: good     Pulmonary:  Pulmonary Normal    Renal/:  Renal/ Normal     Hepatic/GI:  Hepatic/GI Normal    Musculoskeletal:  Musculoskeletal Normal    Neurological:  Neurology Normal    Endocrine:  Endocrine Normal    Psych:  Psychiatric Normal           Physical Exam  General:  Well nourished    Airway/Jaw/Neck:  Airway Findings: Mouth Opening: Normal Tongue: Normal  General Airway Assessment: Adult  Mallampati: II  TM Distance: Normal, at least 6 cm  Jaw/Neck Findings:  Neck ROM: Normal ROM      Dental:  Dental Findings: In tact   Chest/Lungs:  Chest/Lungs Findings: Normal Respiratory Rate     Heart/Vascular:  Heart Findings: Rate: Normal  Rhythm: Regular Rhythm     Abdomen:  Abdomen Findings:  gravid     Mental Status:  Mental Status Findings:  Cooperative, Alert and Oriented         Anesthesia Plan  Type of Anesthesia,  risks & benefits discussed:  Anesthesia Type:  epidural  Patient's Preference:   Intra-op Monitoring Plan: standard ASA monitors  Intra-op Monitoring Plan Comments:   Post Op Pain Control Plan: epidural analgesia  Post Op Pain Control Plan Comments:   Induction:    Beta Blocker:  Patient is not currently on a Beta-Blocker (No further documentation required).       Informed Consent: Patient understands risks and agrees with Anesthesia plan.  Questions answered. Anesthesia consent signed with patient.  ASA Score: 2  emergent   Day of Surgery Review of History & Physical:    H&P update referred to the surgeon.         Ready For Surgery From Anesthesia Perspective.

## 2019-05-08 NOTE — NURSING
0705  Aaox3.  Requesting epidural.  ivf bolus complete.  pericare done with pads changed with copious amounts of clear fluid.  Reviewed poc with verbal recall with pt and .     0708  Called anesthesia for epidural.    0724  Anesthesia at bedside, consent obtained.    0912  Update provided to Dr Manzo via phone of sve, pitocin turned down to 10mu/min during epidural, now at 12mu/min, and toco adjusted so can continue to increase per protocol.  No new orders rec'd.     0317  Updated Dr Manzo via phone of sve and variable and early decels.  Baby nurse informed.

## 2019-05-08 NOTE — LACTATION NOTE
1700 Pt's nurse Monique informed me that pt dealing with spinal headache at this time and declined seeing lactation. No breastfeeding concerns verbalized by pt's RN at this time. Encouraged to contact lactation as needed.

## 2019-05-09 ENCOUNTER — ANESTHESIA EVENT (OUTPATIENT)
Dept: OBSTETRICS AND GYNECOLOGY | Facility: HOSPITAL | Age: 24
End: 2019-05-09
Payer: MEDICAID

## 2019-05-09 ENCOUNTER — ANESTHESIA (OUTPATIENT)
Dept: OBSTETRICS AND GYNECOLOGY | Facility: HOSPITAL | Age: 24
End: 2019-05-09
Payer: MEDICAID

## 2019-05-09 PROCEDURE — 99231 SBSQ HOSP IP/OBS SF/LOW 25: CPT | Mod: ,,, | Performed by: OBSTETRICS & GYNECOLOGY

## 2019-05-09 PROCEDURE — 62322 NJX INTERLAMINAR LMBR/SAC: CPT | Performed by: ANESTHESIOLOGY

## 2019-05-09 PROCEDURE — 25000003 PHARM REV CODE 250: Performed by: ANESTHESIOLOGY

## 2019-05-09 PROCEDURE — 11000001 HC ACUTE MED/SURG PRIVATE ROOM

## 2019-05-09 PROCEDURE — 99231 PR SUBSEQUENT HOSPITAL CARE,LEVL I: ICD-10-PCS | Mod: ,,, | Performed by: OBSTETRICS & GYNECOLOGY

## 2019-05-09 PROCEDURE — 25000003 PHARM REV CODE 250: Performed by: OBSTETRICS & GYNECOLOGY

## 2019-05-09 RX ADMIN — IBUPROFEN 600 MG: 600 TABLET ORAL at 06:05

## 2019-05-09 RX ADMIN — IBUPROFEN 600 MG: 600 TABLET ORAL at 05:05

## 2019-05-09 RX ADMIN — IBUPROFEN 600 MG: 600 TABLET ORAL at 11:05

## 2019-05-09 RX ADMIN — BUTALBITAL, ACETAMINOPHEN, AND CAFFEINE 1 TABLET: 50; 325; 40 TABLET ORAL at 05:05

## 2019-05-09 RX ADMIN — OXYCODONE AND ACETAMINOPHEN 1 TABLET: 5; 325 TABLET ORAL at 06:05

## 2019-05-09 RX ADMIN — BUTALBITAL, ACETAMINOPHEN, AND CAFFEINE 1 TABLET: 50; 325; 40 TABLET ORAL at 04:05

## 2019-05-09 RX ADMIN — ONDANSETRON 8 MG: 8 TABLET, ORALLY DISINTEGRATING ORAL at 06:05

## 2019-05-09 NOTE — ANESTHESIA POSTPROCEDURE EVALUATION
Anesthesia Post Evaluation    Patient: Deepa Randall    Procedure(s) Performed: epidural    Final Anesthesia Type: epidural  Patient location: MBU.  Patient participation: Yes- Able to Participate  Level of consciousness: awake and alert and oriented  Post-procedure vital signs: reviewed and stable  Pain management: adequate  Airway patency: patent  PONV status at discharge: No PONV  Anesthetic complications: no      Cardiovascular status: hemodynamically stable  Respiratory status: room air  Hydration status: euvolemic  Follow-up needed           Vitals Value Taken Time   /67 5/9/2019 12:00 PM   Temp 36.3 °C (97.4 °F) 5/9/2019 12:00 PM   Pulse 61 5/9/2019 12:00 PM   Resp 18 5/9/2019 12:00 PM   SpO2 100 % 5/9/2019 12:00 PM         No case tracking events are documented in the log.      Pain/Noreen Score: Pain Rating Prior to Med Admin: 4 (5/9/2019 11:26 AM)  Pain Rating Post Med Admin: 3 (5/9/2019  6:22 AM)    No catheter in back.  Full return of neurological function.  Able to urinate, ambulate.    Pt reports neck pain and HA with light sensitivity. Plan to perform epidural blood patch given wet tap during epidural placement.     Will follow-up again tomorrow.

## 2019-05-09 NOTE — ANESTHESIA PREPROCEDURE EVALUATION
2019  Deepa Randall is a 24 y.o., female s/p . epidural/CSE procedure complicated by touhy spinal tap,  Now complaining of spinal headache.  Plans for epidural blood patch..  Predelivery plt's 304.  No post-partum bleeding issues.  NKDA    Past Medical History:   Diagnosis Date    Asthma     childhood asthma    Ovarian cyst     left     Past Surgical History:   Procedure Laterality Date    RECONSTRUCTION, KNEE, ACL, HAMSTRING AUTOGRAFT Right 2018    Performed by Phill Tran MD at [facility] OR    RECONSTRUCTION, LIGAMENT, MCL, USING  ACHILLES TENDON ALLOGRAFT Right 2018    Performed by Phill Tran MD at [facility] OR     No results for input(s): WBC, RBC, HGB, HCT, PLT, MCV, MCH, MCHC in the last 24 hours.      Anesthesia Evaluation    I have reviewed the Patient Summary Reports.        Review of Systems  Anesthesia Hx:  No problems with previous Anesthesia   Denies Personal Hx of Anesthesia complications.   Hematology/Oncology:     Oncology Normal    -- Anemia:   EENT/Dental:EENT/Dental Normal   Cardiovascular:  Cardiovascular Normal Exercise tolerance: good     Pulmonary:  Pulmonary Normal    Renal/:  Renal/ Normal     Hepatic/GI:  Hepatic/GI Normal    Musculoskeletal:  Musculoskeletal Normal    Neurological:  Neurology Normal    Endocrine:  Endocrine Normal    Psych:  Psychiatric Normal           Physical Exam  General:  Well nourished    Airway/Jaw/Neck:  Airway Findings: Mouth Opening: Normal Tongue: Normal  General Airway Assessment: Adult  Mallampati: II  TM Distance: Normal, at least 6 cm  Jaw/Neck Findings:  Neck ROM: Normal ROM      Dental:  Dental Findings: In tact   Chest/Lungs:  Chest/Lungs Findings: Normal Respiratory Rate     Heart/Vascular:  Heart Findings: Rate: Normal  Rhythm: Regular Rhythm     Abdomen:  Abdomen Findings:  gravid     Mental  Status:  Mental Status Findings:  Cooperative, Alert and Oriented         Anesthesia Plan  Type of Anesthesia, risks & benefits discussed:  Anesthesia Type:  epidural  Patient's Preference:   Intra-op Monitoring Plan: standard ASA monitors  Intra-op Monitoring Plan Comments:   Post Op Pain Control Plan: epidural analgesia  Post Op Pain Control Plan Comments:   Induction:    Beta Blocker:  Patient is not currently on a Beta-Blocker (No further documentation required).       Informed Consent: Patient understands risks and agrees with Anesthesia plan.  Questions answered. Anesthesia consent signed with patient.  ASA Score: 2     Day of Surgery Review of History & Physical: I have interviewed and examined the patient. I have reviewed the patient's H&P dated:    H&P update referred to the surgeon.         Ready For Surgery From Anesthesia Perspective.

## 2019-05-09 NOTE — CARE UPDATE
Patient complains of continued head and neck pain radiating to shoulders, 7/10 which worsens when sitting up and when standing. Notified anesthesia, Dr. Marcelo; MD verbalized understanding.  Per MD, will continue to treat conservatively tonight (scheduled Fioricet, encourage caffeine drinks).  Also, verbal order received for a 500ML bolus of LR now; RBVO. WCTM.

## 2019-05-09 NOTE — PLAN OF CARE
Problem: Adult Inpatient Plan of Care  Goal: Plan of Care Review  Outcome: Ongoing (interventions implemented as appropriate)  Mom will continue to exclusively breastfeed frequently & on cue at least 8+ times/24 hrs.  Will monitor for signs of adequate fdg. Discussed + robi & importance of BR frequently & effectively. Encouraged breast compression/hand expression during feeding to facilitate transfer of milk. [facility] jaundice handout given. Questions answered. Will call for any needs.

## 2019-05-09 NOTE — LACTATION NOTE
[facility]  Lactation Note - Mom    SUMMARY     Maternal Assessment    Breast Density: Bilateral:, soft  Preferred Pain Scale: number (Numeric Rating Pain Scale)  Comfort/Acceptable Pain Level: 3  Pain Body Location: head  Pain Rating (0-10): Rest: 0  Pain Rating (0-10): Activity: 4  Pain Rating: Rest: 4 - moderate pain  Pain Rating: Activity: 4 - moderate pain  Pain Radiation to: neck, left, neck, right, shoulder, left, shoulder, right  Pain Management Interventions: relaxation techniques promoted  Sleep/Rest/Relaxation: no problem identified, awake  POSS (Pasero Opioid-Induced Sed Scale): 1 - Awake and alert  Fever Reduction/Comfort Measures: medication administered    LATCH Score         Breasts WDL    Breast WDL: WDL    Maternal Infant Feeding    Maternal Preparation: breast care  Maternal Emotional State: independent, relaxed  Infant Positioning: cradle  Signs of Milk Transfer: infant jaw motion present  Pain with Feeding: no  Comfort Measures Before/During Feeding: infant position adjusted, latch adjusted  Nipple Shape After Feeding, Right: round    Lactation Referrals         Lactation Interventions    Breast Care: Breastfeeding: breast milk to nipples, frequency of feeding adjusted, other (see comments)(encouraged frelion BR due to + robi)  Breastfeeding Assistance: feeding cue recognition promoted, feeding on demand promoted, feeding session observed, infant latch-on verified, infant stimulated to wakeful state, infant suck/swallow verified, support offered  Breast Care: Breastfeeding: breast milk to nipples, frequency of feeding adjusted, other (see comments)(encouraged freq BR due to + robi)  Breastfeeding Assistance: feeding cue recognition promoted, feeding on demand promoted, feeding session observed, infant latch-on verified, infant stimulated to wakeful state, infant suck/swallow verified, support offered  Breastfeeding Support: diary/feeding log utilized, encouragement  provided, lactation counseling provided       Breastfeeding Session    Infant Positioning: cradle  Signs of Milk Transfer: infant jaw motion present    Maternal Information

## 2019-05-09 NOTE — ANESTHESIA PROCEDURE NOTES
Epidural    Patient location during procedure: post-op  Block not for primary anesthetic.  Reason for block: at surgeon's request, post-op pain management  Post-op Pain Location: headache and pain on neck movement after dural puncture  Start time: 5/9/2019 12:45 PM  Timeout: 5/9/2019 12:40 PM  End time: 5/9/2019 12:55 PM  Staffing  Anesthesiologist: Cherri Queen MD  Resident/CRNA: May Reardon MD  Performed: resident/CRNA   Preanesthetic Checklist  Completed: patient identified, site marked, pre-op evaluation, timeout performed, IV checked, risks and benefits discussed, monitors and equipment checked, anesthesia consent given and hand hygiene performed  Preparation  Patient position: sitting  Prep: ChloraPrep  Epidural  Administration type: single shot  Interspace: L1-2    Injection technique: LIN saline  Needle and Epidural Catheter  Needle type: Tuohy   Needle gauge: 17  Needle length: 3.5 inches  Needle insertion depth: 6 cm  Needle localization: anatomical landmarks  Assessment  Ease of block: moderate  Patient's tolerance of the procedure: comfortable throughout block  Additional Notes  After identifying anatomical landmarks in the back, left antecubital fossa was prepped with chlorhexidine, 20 ml of blood was aspirated.  After positive loss of resistance/accessing epidural space 20 ml of blood was infiltrated in multiple small aliquots.    Patient able to hold head up and turn neck without pain.    I was present throughout this entire procedure.  -- CMAW No inadvertent dural puncture with Tuohy.

## 2019-05-09 NOTE — PROGRESS NOTES
PPD #1    S: patient doing ok. Reports that neck pain is slightly improved.    O  Temp:  [97.6 °F (36.4 °C)-98.1 °F (36.7 °C)]   Pulse:  [51-93]   Resp:  [16-18]   BP: (104-148)/(53-87)   SpO2:  [91 %-100 %]    Gen: A&ox3, NAD  Abd: soft, fundus firm and nontender  Ext: no edema    AP: 23 yo now  female s/p VAVD doing well.  Continue routine pp care  Will request anesthesia for evaluation of HA, neck pain  Declines circ prior to discharge    candace luke MD

## 2019-05-10 ENCOUNTER — TELEPHONE (OUTPATIENT)
Dept: OBSTETRICS AND GYNECOLOGY | Facility: CLINIC | Age: 24
End: 2019-05-10

## 2019-05-10 VITALS
DIASTOLIC BLOOD PRESSURE: 71 MMHG | SYSTOLIC BLOOD PRESSURE: 125 MMHG | TEMPERATURE: 98 F | RESPIRATION RATE: 18 BRPM | OXYGEN SATURATION: 98 % | HEIGHT: 67 IN | HEART RATE: 62 BPM | WEIGHT: 220 LBS | BODY MASS INDEX: 34.53 KG/M2

## 2019-05-10 DIAGNOSIS — G44.001 INTRACTABLE CLUSTER HEADACHE SYNDROME, UNSPECIFIED CHRONICITY PATTERN: Primary | ICD-10-CM

## 2019-05-10 DIAGNOSIS — R52 POSTPARTUM PAIN: ICD-10-CM

## 2019-05-10 PROCEDURE — 99238 HOSP IP/OBS DSCHRG MGMT 30/<: CPT | Mod: ,,, | Performed by: OBSTETRICS & GYNECOLOGY

## 2019-05-10 PROCEDURE — 99238 PR HOSPITAL DISCHARGE DAY,<30 MIN: ICD-10-PCS | Mod: ,,, | Performed by: OBSTETRICS & GYNECOLOGY

## 2019-05-10 PROCEDURE — 25000003 PHARM REV CODE 250: Performed by: ANESTHESIOLOGY

## 2019-05-10 PROCEDURE — 25000003 PHARM REV CODE 250: Performed by: OBSTETRICS & GYNECOLOGY

## 2019-05-10 RX ORDER — OXYCODONE AND ACETAMINOPHEN 5; 325 MG/1; MG/1
1 TABLET ORAL EVERY 6 HOURS PRN
Qty: 20 TABLET | Refills: 0 | Status: SHIPPED | OUTPATIENT
Start: 2019-05-10 | End: 2019-05-31 | Stop reason: SDUPTHER

## 2019-05-10 RX ORDER — IBUPROFEN 800 MG/1
800 TABLET ORAL EVERY 8 HOURS PRN
Qty: 30 TABLET | Refills: 0 | Status: SHIPPED | OUTPATIENT
Start: 2019-05-10 | End: 2019-05-31 | Stop reason: SDUPTHER

## 2019-05-10 RX ORDER — BUTALBITAL, ACETAMINOPHEN, CAFFEINE AND CODEINE PHOSPHATE 50; 325; 40; 30 MG/1; MG/1; MG/1; MG/1
1 CAPSULE ORAL EVERY 4 HOURS PRN
Qty: 30 EACH | Refills: 0 | Status: ON HOLD | OUTPATIENT
Start: 2019-05-10 | End: 2019-06-07 | Stop reason: HOSPADM

## 2019-05-10 RX ADMIN — BUTALBITAL, ACETAMINOPHEN, AND CAFFEINE 1 TABLET: 50; 325; 40 TABLET ORAL at 12:05

## 2019-05-10 RX ADMIN — BUTALBITAL, ACETAMINOPHEN, AND CAFFEINE 1 TABLET: 50; 325; 40 TABLET ORAL at 05:05

## 2019-05-10 RX ADMIN — IBUPROFEN 600 MG: 600 TABLET ORAL at 12:05

## 2019-05-10 RX ADMIN — IBUPROFEN 600 MG: 600 TABLET ORAL at 05:05

## 2019-05-10 NOTE — PROGRESS NOTES
PPD #2    S: patient doing ok. Reports that neck pain is slightly improved. Headaches resolved with fiorocet.    O  Temp:  [97.4 °F (36.3 °C)-98.3 °F (36.8 °C)]   Pulse:  [51-93]   Resp:  [16-18]   BP: (104-148)/(53-87)   SpO2:  [91 %-100 %]    Gen: A&ox3, NAD  Abd: soft, fundus firm and nontender  Ext: no edema    AP: 23 yo now  female s/p VAVD doing well.  Continue routine pp care  Declines circ prior to discharge    D/c home today      candace luke MD

## 2019-05-10 NOTE — ANESTHESIA POSTPROCEDURE EVALUATION
Anesthesia Post Evaluation    Patient: Deepa Randall    Procedure(s) Performed: * No procedures listed *    Final Anesthesia Type: epidural  Patient location: MBU.  Patient participation: Yes- Able to Participate  Level of consciousness: awake and alert and oriented  Post-procedure vital signs: reviewed and stable  Pain management: adequate  Airway patency: patent  PONV status at discharge: No PONV  Anesthetic complications: no      Cardiovascular status: hemodynamically stable  Respiratory status: room air  Hydration status: euvolemic  Follow-up not needed.          Vitals Value Taken Time   /65 5/10/2019  8:00 AM   Temp 36.5 °C (97.7 °F) 5/10/2019  8:00 AM   Pulse 61 5/10/2019  8:00 AM   Resp 18 5/10/2019  8:00 AM   SpO2 100 % 5/10/2019  8:00 AM         No case tracking events are documented in the log.      Pain/Noreen Score: Pain Rating Prior to Med Admin: 3 (5/10/2019  5:06 AM)  Pain Rating Post Med Admin: 0 (5/10/2019  1:00 AM)      No catheter in back.  No backache.  Full return of neurological function.  Able to urinate, ambulate.  Advised patient to report any new problems of back pain, especially with fever or decreasing bladder function occurring during coming days to weeks.      Pt is s/p epidural blood path 5/9/19. Reports significant improvement in HA and neck pain but still with mild neck pain.

## 2019-05-10 NOTE — TELEPHONE ENCOUNTER
----- Message from Geronimo Manzo MD sent at 5/10/2019  8:15 AM CDT -----  Please add patient to my schedule for pp visit on may 31 at 103am.  Please mail info to her home

## 2019-05-10 NOTE — PLAN OF CARE
Problem: Adult Inpatient Plan of Care  Goal: Plan of Care Review  Outcome: Ongoing (interventions implemented as appropriate)  POC reviewed with patient. Patient tolerating regular diet, ambulating, voiding without difficulty, and passing flatus. VSS afebrile. Pain well controlled with prescribed medications. No distress noted. Will continue to monitor.

## 2019-05-10 NOTE — DISCHARGE INSTRUCTIONS
"Patient Discharge Instructions for Postpartum Women    Resume Regular Diet  Increase activity gradually, no heavy lifting  Shower  No tampons, douching or sexual intercourse.  Discuss birth control options with your physician.  Wear a support bra  Return to work/school when you've been cleared by a physician    Call your physician if     *Fever of 100.4 or higher  *Persistent nausea/ vomiting  *Incisional drainage  *Heavy vaginal bleeding or large clots (Heavy bleeding is soaking 1 pad in an hour)  *Swelling and pain in arms or legs  *Severe headaches, blurred vision or fainting  *Shortness of breath  *Frequency and burning with urination  *Signs of postpartum depression, discuss these signs with your physician    Call lactation services for questions regarding feeding, nipple and breast care, and general questions about lactation.  They can be reached at [phone]         Understanding Postpartum Depression    You've just had a baby.  You know you should be excited and happy.  But instead you find yourself crying for no reason.  You may have trouble coping with your daily tasks.  You feel sad, tired, and hopeless most of the time.  You may even feel ashamed or guilty.  But what you're going through is not your fault and you can feel better.  Talk to your doctor.  He or she can help.    Depression After Childbirth    You may be weepy and tired right after giving birth.  These feelings are normal.  They're sometimes called the "baby blues."  These blues go away 2-3 weeks.  However, postpartum (meaning "after birth") depression lasts much longer and is more sever than the "baby blues."  It can make you feel sad and hopeless.  You may also fear that your baby will be harmed and worry about being a bad mother.      What is Depression?    Depression is a mood disorder that affects the way you think and feel.  The most common symptom is a feeling of deep sadness.  You may also feel as if you just can't cope with life.  "   Other symptoms include:      * Gaining or loosing weight  * Sleeping too much or too little  * Feeling tired all the time  * Feeling restless  * Fears of harming your baby   * Lack of interest in your baby  * Feeling worthless or guilty  * No longer finding pleasure in things you used to  * Having trouble thinking clearly or making decisions  * Thoughts of hurting yourself or your baby    What Causes Postpartum Depression    The exact causes of postpartum depression isn't known.  It may be due to changes in your hormones during and after childbirth.  You may also be tired from caring for your baby and adjusting to being a mother.  All these factors may make you feel depressed.  In some cases, your genes may also play a role.    Depression Can Be Treated    The good news is that there are many ways to treat postpartum depression.  Talking to your doctor is the first step toward feeling better.    Resources:    * National [place] of Mental Health  -- 261.712.6360    www.nimh.nih.gov    * National [place] on Mental Illness --535.104.4757    Www.philipp.org    * Mental Health [place] -- 210.978.8648     Www.[facility].org    * National Suicide Hotline --991.502.2836 (800-SUICIDE)    5980-8011 [employer]  All rights reserved.  This information is not intended as a substitute for professional medical care.  Always follow up with your healthcare professional's instructions.            Breastfeeding Discharge Instructions       Feed the baby at the earliest sign of hunger or comfort  o Hands to mouth, sucking motions  o Rooting or searching for something to suck on  o Dont wait for crying - it is a sign of distress     The feedings may be 8-12 times per 24hrs and will not follow a schedule   Avoid pacifiers and bottles for the first 4 weeks   Alternate the breast you start the feeding with, or start with the breast that feels the fullest   Switch breasts when the baby takes himself off the breast or falls  asleep   Keep offering breasts until the baby looks full, no longer gives hunger signs, and stays asleep when placed on his back in the crib   If the baby is sleepy and wont wake for a feeding, put the baby skin-to-skin dressed in a diaper against the mothers bare chest   Sleep near your baby   The baby should be positioned and latched on to the breast correctly  o Chest-to-chest, chin in the breast  o Babys lips are flipped outward  o Babys mouth is stretched open wide like a shout  o Babys sucking should feel like tugging to the mother  - The baby should be drinking at the breast:  o You should hear swallowing or gulping throughout the feeding  o You should see milk on the babys lips when he comes off the breast  o Your breasts should be softer when the baby is finished feeding  o The baby should look relaxed at the end of feedings  o After the 4th day and your milk is in:  o The babys poop should turn bright yellow and be loose, watery, and seedy  o The baby should have at least 3-4 poops the size of the palm of your hand per day  o The baby should have at least 5-6 wet diapers per day  o The urine should be light yellow in color  You should drink when you are thirsty and eat a healthy diet when you are    hungry.     Take naps to get the rest you need.   Take medications and/or drink alcohol only with permission of your obstetrician    or the babys pediatrician.  You can also call the [facility] Risk [facility],   ([phone]), Monday-Friday, 8am-5pm Central time, to get the most   up-to-date evidence-based information on the use of medications during   pregnancy and breastfeeding.      The baby should be examined by a pediatrician at 3-5 days of age.  Once your   milk comes in, the baby should be gaining at least ½ - 1oz each day and should be back to birthweight no later than 10-14 days of age.          Community Resources    [facility] Breastfeeding Warmline: 991.906.9302  [facility]  clinics: provide incentives and breastpumps to eligible mothers  [employer] (LLLI):  mother-to-mother support group website        www.lll.org  [facility] La Leche [employer]elittle mother-to-mother support groups:        www.llvalarieJIT [employer]olaire.bluebottlebiz        [employer]eche League [facility]   Dr. Nate Young website for latch videos and general information:        www.breastfeedinginc.ca  [facility] is a call center that provides information about the safety of taking medications while breastfeeding.  Call 1-639.995.5089, M-F, 8am-5pm, CT.  International Lactation Consultant Association provides resources for assistance:        www.ilca.org  [facility] Breastfeeding Coalition provides informationand resources for parents  and the community    http://[facility].org  Zip code search of breastfeeding resources and more:                                                                              www.LaBreastfeedingSupport.org          Yeny Morales is a mom-to-mom support group:                             www.nolanesting.com//breastfeedng-support/  [employer] for Healthy Babies:  1-957-931-BABY(3047)  [employer]: a breastfeeding support group for women of color, 625.914.3098

## 2019-05-10 NOTE — NURSING
1015 - reviewed discharge instructions and meds w/pt in preparation for d/c today.  Pt verbalized understanding of instructions and meds.  D/c meds to be delivered to pt's room from outpatient pharmacy.  Pt demonstrates ability to care for herself and for infant.  Pt reports having help at home.

## 2019-05-10 NOTE — DISCHARGE SUMMARY
[facility]  Obstetrics  Discharge Summary      Patient Name: Deepa Randall  MRN: [id number]  Admission Date: 2019  Hospital Length of Stay: 2 days  Discharge Date and Time:  05/10/2019 8:16 AM  Attending Physician: Geronimo Manzo MD   Discharging Provider: Geronimo Manzo MD  Primary Care Provider: Stephany Mcgovern MD    HPI: 25 yo now  female admitted at 39+ weeks for induction of labor.    Hospital Course: The patient's labor course was uncomplicated. She is now s/p uncomplicated normal spontaneous vaginal delivery. Her postpartum course was also uncomplicated. On day of discharge, she was tolerating PO. Her pain was well controlled. She was ambulating and voiding spontaneously.       Final Active Diagnoses:    Diagnosis Date Noted POA    PRINCIPAL PROBLEM:   (normal spontaneous vaginal delivery) [O80] 2019 Not Applicable    Abdominal pain affecting pregnancy [O26.899, R10.9] 2019 Yes      Problems Resolved During this Admission:      Lab Results   Component Value Date    WBC 10.11 2019    HGB 10.5 (L) 2019    HCT 32.4 (L) 2019    MCV 88 2019     2019     O POS    Feeding Method: breast    Immunizations     Date Immunization Status Dose Route/Site Given by    19 1340 MMR Incomplete 0.5 mL Subcutaneous/Left deltoid           Delivery:    Episiotomy: None   Lacerations: None   Repair suture: None   Repair # of packets:     Blood loss (ml): 0     Birth information:  YOB: 2018   Time of birth: 12:28 PM   Sex: male   Delivery type: Vaginal, Vacuum (Extractor)   Gestational Age: 39w3d    Delivery Clinician:      Other providers:       Additional  information:  Forceps:    Vacuum:    Breech:    Observed anomalies      Living?:           APGARS  One minute Five minutes Ten minutes   Skin color:         Heart rate:         Grimace:         Muscle tone:         Breathing:         Totals: 9  9         Placenta: Delivered:       appearance    Pending Diagnostic Studies:     None          Discharged Condition: good    Disposition: Home or Self Care    Follow Up:  Follow-up Information     Geronimo Manzo MD On 5/31/2019.    Specialties:  Obstetrics, Obstetrics and Gynecology  Why:  1030am for pp visit/preop visit  Contact information:  [address]  [address]  [place] [address]  [phone]                 Patient Instructions:      Activity as tolerated     Medications:  Current Discharge Medication List      CONTINUE these medications which have NOT CHANGED    Details   butalbital-acetaminop-caf-cod -33-30 mg Cap Take 1 capsule by mouth every 4 (four) hours as needed (headache).  Qty: 30 each, Refills: 0    Associated Diagnoses: Intractable cluster headache syndrome, unspecified chronicity pattern      ibuprofen (ADVIL,MOTRIN) 800 MG tablet Take 1 tablet (800 mg total) by mouth every 8 (eight) hours as needed.  Qty: 30 tablet, Refills: 0    Associated Diagnoses: Postpartum pain      oxyCODONE-acetaminophen (PERCOCET) 5-325 mg per tablet Take 1 tablet by mouth every 6 (six) hours as needed.  Qty: 20 tablet, Refills: 0    Associated Diagnoses: Postpartum pain      PNV,calcium 72-iron-folic acid (PRENATAL VITAMIN PLUS LOW IRON) 27 mg iron- 1 mg Tab Take 1 tablet (1 each total) by mouth once daily.  Qty: 30 tablet, Refills: 11             Geronimo Manzo MD  Obstetrics  [facility]

## 2019-05-10 NOTE — LACTATION NOTE
[facility]  Lactation Note - Mom    SUMMARY     Maternal Assessment    Breast Density: Bilateral:, soft, filling  Areola: Bilateral:, elastic  Nipples: Bilateral:, everted  Left Nipple Symptoms: tender  Right Nipple Symptoms: tender, bruised, other (see comments)(mild compression stripe)  Preferred Pain Scale: number (Numeric Rating Pain Scale)  Comfort/Acceptable Pain Level: 3  Pain Body Location: head  Pain Rating (0-10): Rest: 3  Pain Rating (0-10): Activity: 3  Pain Rating: Rest: 0 - no pain  Pain Rating: Activity: 0 - no pain  Pain Radiation to: neck, left, neck, right, shoulder, left, shoulder, right  Frequency: intermittent  Quality: aching  Pain Management Interventions: care clustered, pain management plan reviewed with patient/caregiver, pillow support provided, position adjusted, quiet environment facilitated, relaxation techniques promoted  Sleep/Rest/Relaxation: no problem identified, awake  POSS (Pasero Opioid-Induced Sed Scale): 1 - Awake and alert  Fever Reduction/Comfort Measures: medication administered    LATCH Score         Breasts WDL    Breast WDL: WDL  Left Nipple Symptoms: tender  Right Nipple Symptoms: tender, bruised, other (see comments)(mild compression stripe)    Maternal Infant Feeding    Maternal Preparation: breast care, hand hygiene  Maternal Emotional State: independent, relaxed  Infant Positioning: cradle  Signs of Milk Transfer: infant jaw motion present  Pain with Feeding: yes(5/10 per mom)  Pain Location: nipples, bilateral  Pain Description: soreness  Comfort Measures Before/During Feeding: infant position adjusted, latch adjusted  Nipple Shape After Feeding, Right: round    Lactation Referrals    Lactation Referrals: pediatric care provider  Pediatric Care Provider Lactation Follow-up Date/Time: within 2-3 days    Lactation Interventions    Breast Care: Breastfeeding: breast milk to nipples, lanolin to nipples  Breastfeeding Assistance: feeding cue  recognition promoted, feeding on demand promoted  Breast Care: Breastfeeding: breast milk to nipples, lanolin to nipples  Breastfeeding Assistance: feeding cue recognition promoted, feeding on demand promoted  Breastfeeding Support: diary/feeding log utilized, encouragement provided, lactation counseling provided, maternal hydration promoted, maternal nutrition promoted, maternal rest encouraged       Breastfeeding Session    Infant Positioning: cradle  Signs of Milk Transfer: infant jaw motion present    Maternal Information

## 2019-05-10 NOTE — PLAN OF CARE
Problem: Adult Inpatient Plan of Care  Goal: Plan of Care Review  Outcome: Outcome(s) achieved Date Met: 05/10/19  Mom will continue to breastfeed frequently & on cue at least 8+ times/24 hrs.  Will monitor for signs of adequate fdg. Discussed jaundice/importance of BR frequently & effectively. Will avoid giving formula if BR well. Discussed benefits of BR/risks of FF; supply/demand. Will have baby's weight/bili checked at ped's office tomorrow 5/11/19. Will call for any needs.

## 2019-05-16 ENCOUNTER — TELEPHONE (OUTPATIENT)
Dept: PHARMACY | Facility: CLINIC | Age: 24
End: 2019-05-16

## 2019-05-16 NOTE — TELEPHONE ENCOUNTER
Good Morning.    The patient's Butalbital-Ace.-Caf.-Codiene -85-30mg prescription requires an Opioid Worksheet. A copy of the worksheet has been faxed to, [phone].    Several failed attempts have been made in attempts to contact the patient.    If there are any additional questions or concerns about the status of the medication please contact me at, [phone].    Thanks.    Usha Morgan CPhT.  Patient Care Advocate  [facility]  [email or web address]

## 2019-05-20 ENCOUNTER — TELEPHONE (OUTPATIENT)
Dept: OBSTETRICS AND GYNECOLOGY | Facility: CLINIC | Age: 24
End: 2019-05-20

## 2019-05-28 ENCOUNTER — TELEPHONE (OUTPATIENT)
Dept: OBSTETRICS AND GYNECOLOGY | Facility: CLINIC | Age: 24
End: 2019-05-28

## 2019-05-28 NOTE — TELEPHONE ENCOUNTER
Called pt and left a voicemail to call back to let us know if she is still in need of a certain medication.

## 2019-05-29 ENCOUNTER — TELEPHONE (OUTPATIENT)
Dept: OBSTETRICS AND GYNECOLOGY | Facility: CLINIC | Age: 24
End: 2019-05-29

## 2019-05-29 NOTE — TELEPHONE ENCOUNTER
Called pt back and pt is not in need of butalbital-acetaminop-caf-cod -77-30 mg Cap medication anymore.

## 2019-05-31 ENCOUNTER — POSTPARTUM VISIT (OUTPATIENT)
Dept: OBSTETRICS AND GYNECOLOGY | Facility: CLINIC | Age: 24
End: 2019-05-31
Payer: MEDICAID

## 2019-05-31 ENCOUNTER — HOSPITAL ENCOUNTER (OUTPATIENT)
Dept: PREADMISSION TESTING | Facility: HOSPITAL | Age: 24
Discharge: HOME | End: 2019-05-31
Attending: OBSTETRICS & GYNECOLOGY
Payer: MEDICAID

## 2019-05-31 ENCOUNTER — ANESTHESIA EVENT (OUTPATIENT)
Dept: SURGERY | Facility: HOSPITAL | Age: 24
End: 2019-05-31
Payer: MEDICAID

## 2019-05-31 VITALS
BODY MASS INDEX: 32.87 KG/M2 | WEIGHT: 209.88 LBS | SYSTOLIC BLOOD PRESSURE: 130 MMHG | DIASTOLIC BLOOD PRESSURE: 78 MMHG

## 2019-05-31 VITALS
RESPIRATION RATE: 18 BRPM | BODY MASS INDEX: 32.87 KG/M2 | HEART RATE: 65 BPM | TEMPERATURE: 98 F | OXYGEN SATURATION: 98 % | HEIGHT: 67 IN

## 2019-05-31 DIAGNOSIS — G89.18 POSTOPERATIVE PAIN: ICD-10-CM

## 2019-05-31 DIAGNOSIS — Z98.51 S/P TUBAL LIGATION: ICD-10-CM

## 2019-05-31 PROCEDURE — 99999 PR PBB SHADOW E&M-EST. PATIENT-LVL III: CPT | Mod: PBBFAC,,, | Performed by: OBSTETRICS & GYNECOLOGY

## 2019-05-31 PROCEDURE — 99213 OFFICE O/P EST LOW 20 MIN: CPT | Mod: PBBFAC,PO | Performed by: OBSTETRICS & GYNECOLOGY

## 2019-05-31 PROCEDURE — 59430 PR CARE AFTER DELIVERY ONLY: ICD-10-PCS | Mod: ,,, | Performed by: OBSTETRICS & GYNECOLOGY

## 2019-05-31 PROCEDURE — 99999 PR PBB SHADOW E&M-EST. PATIENT-LVL III: ICD-10-PCS | Mod: PBBFAC,,, | Performed by: OBSTETRICS & GYNECOLOGY

## 2019-05-31 RX ORDER — SODIUM CHLORIDE, SODIUM LACTATE, POTASSIUM CHLORIDE, CALCIUM CHLORIDE 600; 310; 30; 20 MG/100ML; MG/100ML; MG/100ML; MG/100ML
INJECTION, SOLUTION INTRAVENOUS CONTINUOUS
Status: CANCELLED | OUTPATIENT
Start: 2019-05-31

## 2019-05-31 RX ORDER — LIDOCAINE HYDROCHLORIDE 10 MG/ML
1 INJECTION, SOLUTION EPIDURAL; INFILTRATION; INTRACAUDAL; PERINEURAL ONCE
Status: CANCELLED | OUTPATIENT
Start: 2019-05-31 | End: 2019-05-31

## 2019-05-31 RX ORDER — IBUPROFEN 800 MG/1
800 TABLET ORAL EVERY 8 HOURS PRN
Qty: 30 TABLET | Refills: 2 | Status: SHIPPED | OUTPATIENT
Start: 2019-05-31 | End: 2020-05-30

## 2019-05-31 RX ORDER — OXYCODONE AND ACETAMINOPHEN 5; 325 MG/1; MG/1
1 TABLET ORAL EVERY 6 HOURS PRN
Qty: 20 TABLET | Refills: 0 | Status: SHIPPED | OUTPATIENT
Start: 2019-05-31 | End: 2021-04-07

## 2019-05-31 NOTE — PROGRESS NOTES
Patient presents for pp visit and preop visit.    The patient presents for her postpartum visit. She complains of lower back pain with certain positions. She also reports constipation.  Denies fever, chills. She denies pain with urination.  Denies diarrhea.     Type of Delivery:  She had VAVD  Delivery Date: 2019  Delivery MD: mike manzo MD  Gender: male  Baby Name: Esteban  Breast Feeding and bottlefeeding  Vaginal Bleeding: scant  Incontinence: no  Depression: no  Natoma yet: no  Contraception discussed and patient desires BTL - will have surgery next week  Work Plans:  Return end of 2019  Support system: [employer]!    ROS: negative    PE:   /78   Wt 95.2 kg (209 lb 14.1 oz)   LMP 2018   Breastfeeding? Yes   BMI 32.87 kg/m²   APPEARANCE: Well nourished, well developed, in no acute distress.  ABDOMEN: Soft. No tenderness or masses. No hepatosplenomegaly. No hernias.   PELVIC: Normal external female genitalia without lesions. Normal hair distribution. Adequate perineal body, normal urethral meatus. Vagina moist and well rugated without lesions or discharge. Cervix pink and without lesions. No significant cystocele or rectocele. Bimanual exam deferred      Dr. Manzo' Preop Visit    Diagnosis: desires permanent sterilization  Planned Procedure: LSC bilateral salpingectomy  Date of Planned Procedure:     HPI: Deepa Randall is a 24 y.o. female now  female who desires permanent sterilization.    ROS:  GENERAL: Denies weight gain or weight loss. Feeling well overall.   SKIN: Denies rash or lesions.   HEAD: Denies head injury or headache.   CHEST: Denies chest pain or shortness of breath.   CARDIOVASCULAR: Denies palpitations or left sided chest pain.   ABDOMEN: No abdominal pain, constipation, diarrhea, nausea, vomiting or rectal bleeding.   URINARY: No frequency, dysuria, hematuria, or burning on urination.  REPRODUCTIVE: See HPI.   HEMATOLOGIC: No easy  bruisability or excessive bleeding.   MUSCULOSKELETAL: Denies joint pain or swelling.   NEUROLOGIC: Denies syncope or weakness.   PSYCHIATRIC: Denies depression, anxiety or mood swings.   Positive back pain    PMHx:   Past Medical History:   Diagnosis Date    Asthma     childhood asthma    Ovarian cyst     left       Surgical hx:   Past Surgical History:   Procedure Laterality Date    RECONSTRUCTION, KNEE, ACL, HAMSTRING AUTOGRAFT Right 2018    Performed [facility] at [facility] [place]    RECONSTRUCTION, LIGAMENT, MCL, USING  ACHILLES TENDON ALLOGRAFT Right 2018    Performed by Phill Tran MD at [facility] [place]       GYNhx: Patient's last menstrual period was 2018.    Obhx: ,  x 2    ALLERGY: NKDA    MEDS: Reviewed, reconciled      Current Outpatient Medications:     butalbital-acetaminop-caf-cod -91-30 mg Cap, Take 1 capsule by mouth every 4 (four) hours as needed (headache)., Disp: 30 each, Rfl: 0    ibuprofen (ADVIL,MOTRIN) 800 MG tablet, Take 1 tablet (800 mg total) by mouth every 8 (eight) hours as needed., Disp: 30 tablet, Rfl: 0    oxyCODONE-acetaminophen (PERCOCET) 5-325 mg per tablet, Take 1 tablet by mouth every 6 (six) hours as needed., Disp: 20 tablet, Rfl: 0    PNV,calcium 72-iron-folic acid (PRENATAL VITAMIN PLUS LOW IRON) 27 mg iron- 1 mg Tab, Take 1 tablet (1 each total) by mouth once daily., Disp: 30 tablet, Rfl: 11    Social hx:    Social History     Socioeconomic History    Marital status: Single     Spouse name: Not on file    Number of children: Not on file    Years of education: Not on file    Highest education level: Not on file   Occupational History    Not on file   Social Needs    Financial resource strain: Not on file    Food insecurity:     Worry: Not on file     Inability: Not on file    Transportation needs:     Medical: Not on file     Non-medical: Not on file   Tobacco Use    Smoking status: Former Smoker     Packs/day: 0.33      Types: Cigarettes    Smokeless tobacco: Never Used    Tobacco comment: quit 4 months ago   Substance and Sexual Activity    Alcohol use: No    Drug use: No    Sexual activity: Yes     Partners: Male   Lifestyle    Physical activity:     Days per week: Not on file     Minutes per session: Not on file    Stress: Not on file   Relationships    Social connections:     Talks on phone: Not on file     Gets together: Not on file     Attends Jew service: Not on file     Active member of club or organization: Not on file     Attends meetings of clubs or organizations: Not on file     Relationship status: Not on file   Other Topics Concern    Not on file   Social History Narrative    Not on file       Family hx:    Family History   Problem Relation Age of Onset    Migraines Mother     Cancer Maternal Aunt        PE:   /78   Wt 95.2 kg (209 lb 14.1 oz)   LMP 08/05/2018   Breastfeeding? Yes   BMI 32.87 kg/m²   APPEARANCE: Well nourished, well developed, in no acute distress.  As above    A/P: Deepa Randall is a 24 y.o. female who presents for postpartum care and preop evaluation.      1) Postpartum visit  -patient doing well,provided with list of OTC meds to take for constipation  -contraception: wants BTL - scheduled  -next Pap due:     2) Surgery:   -Risks and benefits of surgery discussed with the patient.  All questions were answered.  Consents for surgery and blood were signed by the patient today.  -Patient has been instructed to be NPO on night prior to procedure  -Preop labs ordered: cbc, serum bhcg, UPT, type and screen  -Rx for postop pain management provided to patient at today's preop visit motrin/percocet  -postop visit scheduled June 21 at 830am    Patient to proceed now immediately to preop    JENNIFER Manzo MD

## 2019-05-31 NOTE — ANESTHESIA PREPROCEDURE EVALUATION
05/31/2019   Deepa Randall is a 24 y.o. female scheduled for laparoscopic bilateral tubal ligation on 6/7/19.    Past Medical History:   Diagnosis Date    Asthma     childhood asthma    Ovarian cyst     left     Past Surgical History:   Procedure Laterality Date    RECONSTRUCTION, KNEE, ACL, HAMSTRING AUTOGRAFT Right 8/30/2018    [facility]formed [facility]    RECONSTRUCTION, LIGAMENT, MCL, USING  ACHILLES TENDON ALLOGRAFT Right 8/30/2018    Performed by [facility] at [facility] OR       Anesthesia Evaluation    I have reviewed the Patient Summary Reports.    I have reviewed the Nursing Notes.   I have reviewed the Medications.     Review of Systems  Anesthesia Hx:  No previous Anesthesia  Denies Family Hx of Anesthesia complications.  Personal Hx of Anesthesia complications  Back Pain, residual back pain and after spinal/epidural anesthesia, Post Dural Puncture Headache (required blood patch ) and after epidural anesthesia  Social:  Former Smoker, Social Alcohol Use Quit smoking 1 year ago    Hematology/Oncology:         -- Anemia:   Cardiovascular:  Cardiovascular Normal Exercise tolerance: good   Denies Angina.    Pulmonary:   Asthma asymptomatic Denies Shortness of breath.    Renal/:  Renal/ Normal     Hepatic/GI:  Hepatic/GI Normal    Neurological:   Denies Seizures. Hx of vertigo   Endocrine:  Endocrine Normal        Physical Exam  General:  Obesity    Airway/Jaw/Neck:  Airway Findings: Mouth Opening: Normal Tongue: Normal  General Airway Assessment: Adult  Mallampati: II  Improves to I with phonation.  TM Distance: Normal, at least 6 cm  Jaw/Neck Findings:  Neck ROM: Normal ROM      Dental:  Dental Findings: In tact   Chest/Lungs:  Chest/Lungs Findings: Clear to auscultation, Normal Respiratory Rate     Heart/Vascular:  Heart Findings: Rate: Normal  Rhythm:  Regular Rhythm  Sounds: Normal  Heart murmur: negative       Mental Status:  Mental Status Findings:  Cooperative, Alert and Oriented         Anesthesia Plan  Type of Anesthesia, risks & benefits discussed:  Anesthesia Type:  general  Patient's Preference:   Intra-op Monitoring Plan:   Intra-op Monitoring Plan Comments:   Post Op Pain Control Plan:   Post Op Pain Control Plan Comments:   Induction:   IV  Beta Blocker:  Patient is not currently on a Beta-Blocker (No further documentation required).       Informed Consent: Patient understands risks and agrees with Anesthesia plan.  Questions answered. Anesthesia consent signed with patient.  ASA Score: 2     Day of Surgery Review of History & Physical:  There are no significant changes.      Anesthesia Plan Notes: Anesthesia consent to be signed prior to procedure on 6/7/19          Ready For Surgery From Anesthesia Perspective.

## 2019-05-31 NOTE — DISCHARGE INSTRUCTIONS
Your surgery is scheduled for 6/7    Please report to Outpatient Surgery Intake Office on the 2nd FLOOR at 0800 a.m.          INSTRUCTIONS IMPORTANT!!!  ¨ Do not eat or drink after 12 midnight-including water. OK to brush teeth, no   gum, candy or mints!    ¨ Take only these medicines with a small swallow of water-morning of surgery.        ____  Proceed to [facility] on *** for additional blood test.        ____  Do not wear makeup, including mascara.  ____  No powder, lotions or creams to surgical area.  ____  Please remove all jewelry, including piercings and leave at home.  ____  No money or valuables needed. Please leave at home.  ____  Please bring any documents given by your doctor.  ____  If going home the same day, arrange for a ride home. You will not be able to             drive if Anesthesia was used.  ____  Children under 18 years require a parent / guardian present the entire time             they are in surgery / recovery.  ____  Wear loose fitting clothing. Allow for dressings, bandages.  ____  Stop Aspirin, Ibuprofen, Motrin and Aleve at least 3-5 days before surgery, unless otherwise instructed by your doctor, or the nurse.   You MAY use Tylenol/acetaminophen until day of surgery.  ____  Wash the surgical area with Hibiclens the night before surgery, and again the             morning of surgery.  Be sure to rinse hibiclens off completely (if instructed by   nurse).  ____  If you take diabetic medication, do not take am of surgery unless instructed by Doctor.  ____  Call MD for temperature above 101 degrees or any other signs of infection such as Urinary (bladder) infection, Upper respiratory infection, skin boils, etc.  ____ Stop taking any Fish Oil supplement or any Vitamins that contain Vitamin E at least 5 days prior to surgery.  ____ Do Not wear your contact lenses the day of your procedure.  You may wear your glasses.      ____Do not shave surgical site for 3 days prior to  surgery.  ____ Practice Good hand washing before, during, and after procedure.      I have read or had read and explained to me, and understand the above information.  Additional comments or instructions:  For additional questions call [phone]      ANESTHESIA SIDE EFFECTS  -For the first 24 hours after surgery:  Do not drive, use heavy equipment, make important decisions, or drink alcohol  -It is normal to feel sleepy for several hours.  Rest until you are more awake.  -Have someone stay with you, if needed.  They can watch for problems and help keep you safe.  -Some possible post anesthesia side effects include: nausea and vomiting, sore throat and hoarseness, sleepiness, and dizziness.        Pre-Op Bathing Instructions    Before surgery, you can play an important role in your own health.    Because skin is not sterile, we need to be sure that your skin is as free of germs as possible. By following the instructions below, you can reduce the number of germs on your skin before surgery.    IMPORTANT: You will need to shower with a special soap called Hibiclens*, available at any pharmacy.  If you are allergic to Chlorhexidine (the antiseptic in Hibiclens), use an antibacterial soap such as Dial Soap for your preoperative shower.  You will shower with Hibiclens both the night before your surgery and the morning of your surgery.  Do not use Hibiclens on the head, face or genitals to avoid injury to those areas.    STEP #1: THE NIGHT BEFORE YOUR SURGERY     1. Do not shave the area of your body where your surgery will be performed.  2. Shower and wash your hair and body as usual with your normal soap and shampoo.  3. Rinse your hair and body thoroughly after you shower to remove all soap residue.  4. With your hand, apply one packet of Hibiclens soap to the surgical site.   5. Wash the site gently for five (5) minutes. Do not scrub your skin too hard.   6. Do not wash with your regular soap after Hibiclens is  used.  7. Rinse your body thoroughly.  8. Pat yourself dry with a clean, soft towel.  9. Do not use lotion, cream, or powder.  10. Wear clean clothes.    STEP #2: THE MORNING OF YOUR SURGERY     1. Repeat Step #1.    * Not to be used by people allergic to Chlorhexidine.      Your Laparoscopic Tubal Sterilization Procedure  Getting ready for surgery  Your doctor will talk with you about preparing for surgery. You will need to:  · Sign a sterilization consent form. This often must be signed weeks in advance.  · Have tests, such as blood tests. These help show your general health.  · Tell your doctor if you take any medicines, supplements, or herbal remedies. You may need to stop taking some of them before surgery.  · Stop eating and drinking anything after midnight, the night before surgery.  · Arrange for an adult family member or friend to give you a ride home after surgery.  · Arrive at the hospital or surgical facility on time. You will be asked to sign certain forms and change into a patient gown.  During surgery  · Youll be given an IV (intravenous line) and medicine that lets you sleep during surgery.  · After the anesthesia takes effect, your surgeon makes a small incision in or below your navel.  · Your abdomen is inflated with small amounts of gas to lift the abdominal wall. This makes it easier to guide instruments to the tubes.  · Your surgeon then inserts the laparoscope to view the organs in your abdomen.  · Surgical instruments may be placed through the laparoscope or through other small incisions.  · The fallopian tubes are blocked using one of several methods (see below).  · Once the tubes are blocked, your surgeon slowly releases the gas and removes the instruments.  · The incisions are closed with sutures or staples.  Blocking the fallopian tubes  To block the tubes, your surgeon will use one of the methods listed below.       Cauterization uses electrical current to heat and seal each tube. The  sealed ends of the tubes may then be cut. A ring or band closes each tube, keeping egg and sperm from being able to meet. It is left in place. A clip shuts off each tube, blocking the passage of sperm and egg. It is left in place.   After surgery  Youll rest in the recovery area until you feel well enough to go home. Be sure to have an adult friend or family member drive you. You will likely feel tired, so take it easy for the rest of the day. Ask your doctor when its OK to resume your normal routine. For the first few days you may have:  · Pain at the incision sites. Use pain relief medicine if needed.  · Shoulder pain. This is caused by the gas used during surgery. You may also have a gassy or bloated feeling.  · A small amount of vaginal bleeding. Use pads instead of tampons.  When to call your healthcare provider  Call your healthcare provider if you have:  · Redness, drainage, or swelling at the incisions  · A fever of 100.4°F (38°C) or higher, or as directed by your healthcare provider  · Difficulty urinating  · Foul-smelling or unusual vaginal discharge  · Severe abdominal pain or bloating  · Nausea or vomiting  · Persistent or heavy bleeding. This means more than a pad an hour for 2 hours.  · A missed period, irregular bleeding, or severe abdominal pain. These symptoms can be signs of a tubal pregnancy.   Date Last Reviewed: 5/12/2015  © 3350-9969 [employer]. [address] [place] [address], [place] [address]. All rights reserved. This information is not intended as a substitute for professional medical care. Always follow your healthcare professional's instructions.    Anesthesia: General Anesthesia     You are watched continuously during your procedure by your anesthesia provider.     Youre due to have surgery. During surgery, youll be given medicine called anesthesia or anesthetic. This will keep you comfortable and pain-free. Your anesthesia provider will use general anesthesia.  What is  general anesthesia?  General anesthesia puts you into a state like deep sleep. It goes into the bloodstream (IV anesthetics), into the lungs (gas anesthetics), or both. You feel nothing during the procedure. You will not remember it. During the procedure, the anesthesia provider monitors you continuously. He or she checks your heart rate and rhythm, blood pressure, breathing, and blood oxygen.  · IV anesthetics. IV anesthetics are given through an IV line in your arm. Theyre often given first. This is so you are asleep before a gas anesthetic is started. Some kinds of IV anesthetics relieve pain. Others relax you. Your doctor will decide which kind is best in your case.  · Gas anesthetics. Gas anesthetics are breathed into the lungs. They are often used to keep you asleep. They can be given through a facemask or a tube placed in your larynx or trachea (breathing tube).  ¨ If you have a facemask, your anesthesia provider will most likely place it over your nose and mouth while youre still awake. Youll breathe oxygen through the mask as your IV anesthetic is started. Gas anesthetic may be added through the mask.  ¨ If you have a tube in the larynx or trachea, it will be inserted into your throat after youre asleep.  Anesthesia tools and medicines  You will likely have:  · IV anesthetics. These are put into an IV line into your bloodstream.  · Gas anesthetics. You breathe these anesthetics into your lungs, where they pass into your bloodstream.  · Pulse oximeter. This is a small clip that is attached to the end of your finger. This measures your blood oxygen level.  · Electrocardiography leads (electrodes). These are small sticky pads that are placed on your chest. They record your heart rate and rhythm.  · Blood pressure cuff. This reads your blood pressure.  Risks and possible complications  General anesthesia has some risks. These include:  · Breathing problems  · Nausea and vomiting  · Sore throat or hoarseness  (usually temporary)  · Allergic reaction to the anesthetic  · Irregular heartbeat (rare)  · Cardiac arrest (rare)   Anesthesia safety  · Follow all instructions you are given for how long not to eat or drink before your procedure.  · Be sure your doctor knows what medicines and drugs you take. This includes over-the-counter medicines, herbs, supplements, alcohol or other drugs. You will be asked when those were last taken.  · Have an adult family member or friend drive you home after the procedure.  · For the first 24 hours after your surgery:  ¨ Do not drive or use heavy equipment.  ¨ Do not make important decisions or sign legal documents. If important decisions or signing legal documents is necessary during the first 24 hours after surgery, have a trusted family member or spouse act on your behalf.  ¨ Avoid alcohol.  ¨ Have a responsible adult stay with you. He or she can watch for problems and help keep you safe.  Date Last Reviewed: 12/1/2016  © 2624-6373 [employer]. [address] [place] [address], [place] [address]. All rights reserved. This information is not intended as a substitute for professional medical care. Always follow your healthcare professional's instructions.

## 2019-05-31 NOTE — PRE-PROCEDURE INSTRUCTIONS
Cheng Allie [phone]      Allergies, medical, surgical, family and psychosocial histories reviewed with patient. Periop plan of care reviewed. Preop instructions given, including medications to take and to hold. Hibiclens soap and instructions on use given. Time allotted for questions to be addressed.  Patient verbalized understanding.

## 2019-06-07 ENCOUNTER — HOSPITAL ENCOUNTER (OUTPATIENT)
Facility: HOSPITAL | Age: 24
Discharge: HOME | End: 2019-06-07
Attending: OBSTETRICS & GYNECOLOGY | Admitting: OBSTETRICS & GYNECOLOGY
Payer: MEDICAID

## 2019-06-07 ENCOUNTER — ANESTHESIA (OUTPATIENT)
Dept: SURGERY | Facility: HOSPITAL | Age: 24
End: 2019-06-07
Payer: MEDICAID

## 2019-06-07 VITALS
HEIGHT: 67 IN | OXYGEN SATURATION: 99 % | HEART RATE: 55 BPM | TEMPERATURE: 98 F | RESPIRATION RATE: 17 BRPM | DIASTOLIC BLOOD PRESSURE: 75 MMHG | BODY MASS INDEX: 32.65 KG/M2 | SYSTOLIC BLOOD PRESSURE: 130 MMHG | WEIGHT: 208 LBS

## 2019-06-07 DIAGNOSIS — Z98.51 S/P TUBAL LIGATION: ICD-10-CM

## 2019-06-07 DIAGNOSIS — Z01.818 PREOP TESTING: Primary | ICD-10-CM

## 2019-06-07 LAB
B-HCG UR QL: NEGATIVE
CTP QC/QA: YES

## 2019-06-07 PROCEDURE — 63600175 PHARM REV CODE 636 W HCPCS: Performed by: STUDENT IN AN ORGANIZED HEALTH CARE EDUCATION/TRAINING PROGRAM

## 2019-06-07 PROCEDURE — 88305 TISSUE SPECIMEN TO PATHOLOGY - SURGERY: ICD-10-PCS | Mod: 26,,, | Performed by: PATHOLOGY

## 2019-06-07 PROCEDURE — 27201423 OPTIME MED/SURG SUP & DEVICES STERILE SUPPLY: Performed by: OBSTETRICS & GYNECOLOGY

## 2019-06-07 PROCEDURE — 25000003 PHARM REV CODE 250: Performed by: OBSTETRICS & GYNECOLOGY

## 2019-06-07 PROCEDURE — 00840 ANES IPER PX LOWER ABD NOS: CPT | Performed by: OBSTETRICS & GYNECOLOGY

## 2019-06-07 PROCEDURE — 58661 PR LAP,RMV  ADNEXAL STRUCTURE: ICD-10-PCS | Mod: 50,,, | Performed by: OBSTETRICS & GYNECOLOGY

## 2019-06-07 PROCEDURE — 58661 LAPAROSCOPY REMOVE ADNEXA: CPT | Mod: 50,,, | Performed by: OBSTETRICS & GYNECOLOGY

## 2019-06-07 PROCEDURE — 71000016 HC POSTOP RECOV ADDL HR: Performed by: OBSTETRICS & GYNECOLOGY

## 2019-06-07 PROCEDURE — 36000708 HC OR TIME LEV III 1ST 15 MIN: Performed by: OBSTETRICS & GYNECOLOGY

## 2019-06-07 PROCEDURE — 81025 URINE PREGNANCY TEST: CPT | Performed by: OBSTETRICS & GYNECOLOGY

## 2019-06-07 PROCEDURE — 63600175 PHARM REV CODE 636 W HCPCS: Performed by: OBSTETRICS & GYNECOLOGY

## 2019-06-07 PROCEDURE — 88305 TISSUE EXAM BY PATHOLOGIST: CPT | Performed by: PATHOLOGY

## 2019-06-07 PROCEDURE — 71000033 HC RECOVERY, INTIAL HOUR: Performed by: OBSTETRICS & GYNECOLOGY

## 2019-06-07 PROCEDURE — 36000709 HC OR TIME LEV III EA ADD 15 MIN: Performed by: OBSTETRICS & GYNECOLOGY

## 2019-06-07 PROCEDURE — 71000015 HC POSTOP RECOV 1ST HR: Performed by: OBSTETRICS & GYNECOLOGY

## 2019-06-07 PROCEDURE — 37000008 HC ANESTHESIA 1ST 15 MINUTES: Performed by: OBSTETRICS & GYNECOLOGY

## 2019-06-07 PROCEDURE — 88305 TISSUE EXAM BY PATHOLOGIST: CPT | Mod: 26,,, | Performed by: PATHOLOGY

## 2019-06-07 PROCEDURE — 37000009 HC ANESTHESIA EA ADD 15 MINS: Performed by: OBSTETRICS & GYNECOLOGY

## 2019-06-07 PROCEDURE — 25000003 PHARM REV CODE 250: Performed by: STUDENT IN AN ORGANIZED HEALTH CARE EDUCATION/TRAINING PROGRAM

## 2019-06-07 RX ORDER — SODIUM CHLORIDE, SODIUM LACTATE, POTASSIUM CHLORIDE, CALCIUM CHLORIDE 600; 310; 30; 20 MG/100ML; MG/100ML; MG/100ML; MG/100ML
INJECTION, SOLUTION INTRAVENOUS CONTINUOUS PRN
Status: DISCONTINUED | OUTPATIENT
Start: 2019-06-07 | End: 2019-06-07

## 2019-06-07 RX ORDER — KETOROLAC TROMETHAMINE 30 MG/ML
INJECTION, SOLUTION INTRAMUSCULAR; INTRAVENOUS
Status: DISCONTINUED | OUTPATIENT
Start: 2019-06-07 | End: 2019-06-07

## 2019-06-07 RX ORDER — SODIUM CHLORIDE, SODIUM LACTATE, POTASSIUM CHLORIDE, CALCIUM CHLORIDE 600; 310; 30; 20 MG/100ML; MG/100ML; MG/100ML; MG/100ML
INJECTION, SOLUTION INTRAVENOUS CONTINUOUS
Status: DISCONTINUED | OUTPATIENT
Start: 2019-06-07 | End: 2019-06-07 | Stop reason: HOSPADM

## 2019-06-07 RX ORDER — CEFAZOLIN SODIUM 2 G/50ML
2 SOLUTION INTRAVENOUS
Status: COMPLETED | OUTPATIENT
Start: 2019-06-07 | End: 2019-06-07

## 2019-06-07 RX ORDER — ONDANSETRON 2 MG/ML
INJECTION INTRAMUSCULAR; INTRAVENOUS
Status: DISCONTINUED | OUTPATIENT
Start: 2019-06-07 | End: 2019-06-07

## 2019-06-07 RX ORDER — MEPERIDINE HYDROCHLORIDE 50 MG/ML
12.5 INJECTION INTRAMUSCULAR; INTRAVENOUS; SUBCUTANEOUS ONCE AS NEEDED
Status: DISCONTINUED | OUTPATIENT
Start: 2019-06-07 | End: 2019-06-07 | Stop reason: HOSPADM

## 2019-06-07 RX ORDER — SUCCINYLCHOLINE CHLORIDE 20 MG/ML
INJECTION INTRAMUSCULAR; INTRAVENOUS
Status: DISCONTINUED | OUTPATIENT
Start: 2019-06-07 | End: 2019-06-07

## 2019-06-07 RX ORDER — DIPHENHYDRAMINE HYDROCHLORIDE 50 MG/ML
25 INJECTION INTRAMUSCULAR; INTRAVENOUS EVERY 4 HOURS PRN
Status: DISCONTINUED | OUTPATIENT
Start: 2019-06-07 | End: 2019-06-07 | Stop reason: HOSPADM

## 2019-06-07 RX ORDER — OXYCODONE AND ACETAMINOPHEN 5; 325 MG/1; MG/1
1 TABLET ORAL
Status: DISCONTINUED | OUTPATIENT
Start: 2019-06-07 | End: 2019-06-07 | Stop reason: HOSPADM

## 2019-06-07 RX ORDER — METOCLOPRAMIDE HYDROCHLORIDE 5 MG/ML
10 INJECTION INTRAMUSCULAR; INTRAVENOUS EVERY 10 MIN PRN
Status: DISCONTINUED | OUTPATIENT
Start: 2019-06-07 | End: 2019-06-07 | Stop reason: HOSPADM

## 2019-06-07 RX ORDER — ONDANSETRON 8 MG/1
8 TABLET, ORALLY DISINTEGRATING ORAL EVERY 8 HOURS PRN
Status: DISCONTINUED | OUTPATIENT
Start: 2019-06-07 | End: 2019-06-07 | Stop reason: HOSPADM

## 2019-06-07 RX ORDER — LIDOCAINE HYDROCHLORIDE 10 MG/ML
1 INJECTION, SOLUTION EPIDURAL; INFILTRATION; INTRACAUDAL; PERINEURAL ONCE
Status: DISCONTINUED | OUTPATIENT
Start: 2019-06-07 | End: 2019-06-07 | Stop reason: HOSPADM

## 2019-06-07 RX ORDER — MIDAZOLAM HYDROCHLORIDE 1 MG/ML
INJECTION, SOLUTION INTRAMUSCULAR; INTRAVENOUS
Status: DISCONTINUED | OUTPATIENT
Start: 2019-06-07 | End: 2019-06-07

## 2019-06-07 RX ORDER — HYDROMORPHONE HYDROCHLORIDE 2 MG/ML
0.5 INJECTION, SOLUTION INTRAMUSCULAR; INTRAVENOUS; SUBCUTANEOUS EVERY 5 MIN PRN
Status: DISCONTINUED | OUTPATIENT
Start: 2019-06-07 | End: 2019-06-07 | Stop reason: HOSPADM

## 2019-06-07 RX ORDER — HYDROCODONE BITARTRATE AND ACETAMINOPHEN 5; 325 MG/1; MG/1
1 TABLET ORAL EVERY 4 HOURS PRN
Status: DISCONTINUED | OUTPATIENT
Start: 2019-06-07 | End: 2019-06-07 | Stop reason: HOSPADM

## 2019-06-07 RX ORDER — LIDOCAINE HCL/PF 100 MG/5ML
SYRINGE (ML) INTRAVENOUS
Status: DISCONTINUED | OUTPATIENT
Start: 2019-06-07 | End: 2019-06-07

## 2019-06-07 RX ORDER — PROPOFOL 10 MG/ML
VIAL (ML) INTRAVENOUS
Status: DISCONTINUED | OUTPATIENT
Start: 2019-06-07 | End: 2019-06-07

## 2019-06-07 RX ORDER — DIPHENHYDRAMINE HCL 25 MG
25 CAPSULE ORAL EVERY 4 HOURS PRN
Status: DISCONTINUED | OUTPATIENT
Start: 2019-06-07 | End: 2019-06-07 | Stop reason: HOSPADM

## 2019-06-07 RX ORDER — ONDANSETRON 2 MG/ML
4 INJECTION INTRAMUSCULAR; INTRAVENOUS DAILY PRN
Status: DISCONTINUED | OUTPATIENT
Start: 2019-06-07 | End: 2019-06-07 | Stop reason: HOSPADM

## 2019-06-07 RX ORDER — DIPHENHYDRAMINE HYDROCHLORIDE 50 MG/ML
25 INJECTION INTRAMUSCULAR; INTRAVENOUS EVERY 6 HOURS PRN
Status: DISCONTINUED | OUTPATIENT
Start: 2019-06-07 | End: 2019-06-07 | Stop reason: HOSPADM

## 2019-06-07 RX ORDER — ROCURONIUM BROMIDE 10 MG/ML
INJECTION, SOLUTION INTRAVENOUS
Status: DISCONTINUED | OUTPATIENT
Start: 2019-06-07 | End: 2019-06-07

## 2019-06-07 RX ORDER — ACETAMINOPHEN 10 MG/ML
INJECTION, SOLUTION INTRAVENOUS
Status: DISCONTINUED | OUTPATIENT
Start: 2019-06-07 | End: 2019-06-07

## 2019-06-07 RX ORDER — FENTANYL CITRATE 50 UG/ML
INJECTION, SOLUTION INTRAMUSCULAR; INTRAVENOUS
Status: DISCONTINUED | OUTPATIENT
Start: 2019-06-07 | End: 2019-06-07

## 2019-06-07 RX ADMIN — MIDAZOLAM 2 MG: 1 INJECTION INTRAMUSCULAR; INTRAVENOUS at 07:06

## 2019-06-07 RX ADMIN — HYDROMORPHONE HYDROCHLORIDE 0.5 MG: 2 INJECTION, SOLUTION INTRAMUSCULAR; INTRAVENOUS; SUBCUTANEOUS at 08:06

## 2019-06-07 RX ADMIN — SODIUM CHLORIDE, SODIUM LACTATE, POTASSIUM CHLORIDE, AND CALCIUM CHLORIDE: .6; .31; .03; .02 INJECTION, SOLUTION INTRAVENOUS at 09:06

## 2019-06-07 RX ADMIN — FENTANYL CITRATE 100 MCG: 50 INJECTION, SOLUTION INTRAMUSCULAR; INTRAVENOUS at 07:06

## 2019-06-07 RX ADMIN — PROPOFOL 150 MG: 10 INJECTION, EMULSION INTRAVENOUS at 07:06

## 2019-06-07 RX ADMIN — FENTANYL CITRATE 50 MCG: 50 INJECTION, SOLUTION INTRAMUSCULAR; INTRAVENOUS at 08:06

## 2019-06-07 RX ADMIN — LIDOCAINE HYDROCHLORIDE 80 MG: 20 INJECTION, SOLUTION INTRAVENOUS at 07:06

## 2019-06-07 RX ADMIN — ONDANSETRON 4 MG: 2 INJECTION, SOLUTION INTRAMUSCULAR; INTRAVENOUS at 07:06

## 2019-06-07 RX ADMIN — CEFAZOLIN SODIUM 2 G: 2 SOLUTION INTRAVENOUS at 07:06

## 2019-06-07 RX ADMIN — ROCURONIUM BROMIDE 5 MG: 10 INJECTION, SOLUTION INTRAVENOUS at 07:06

## 2019-06-07 RX ADMIN — SUCCINYLCHOLINE CHLORIDE 120 MG: 20 INJECTION, SOLUTION INTRAMUSCULAR; INTRAVENOUS at 07:06

## 2019-06-07 RX ADMIN — ACETAMINOPHEN 1000 MG: 10 INJECTION, SOLUTION INTRAVENOUS at 07:06

## 2019-06-07 RX ADMIN — ONDANSETRON 8 MG: 8 TABLET, ORALLY DISINTEGRATING ORAL at 09:06

## 2019-06-07 RX ADMIN — KETOROLAC TROMETHAMINE 15 MG: 30 INJECTION, SOLUTION INTRAMUSCULAR; INTRAVENOUS at 07:06

## 2019-06-07 RX ADMIN — HYDROCODONE BITARTRATE AND ACETAMINOPHEN 1 TABLET: 5; 325 TABLET ORAL at 09:06

## 2019-06-07 RX ADMIN — SODIUM CHLORIDE, SODIUM LACTATE, POTASSIUM CHLORIDE, AND CALCIUM CHLORIDE: .6; .31; .03; .02 INJECTION, SOLUTION INTRAVENOUS at 07:06

## 2019-06-07 RX ADMIN — SODIUM CHLORIDE, SODIUM LACTATE, POTASSIUM CHLORIDE, AND CALCIUM CHLORIDE: .6; .31; .03; .02 INJECTION, SOLUTION INTRAVENOUS at 06:06

## 2019-06-07 RX ADMIN — FENTANYL CITRATE 50 MCG: 50 INJECTION, SOLUTION INTRAMUSCULAR; INTRAVENOUS at 07:06

## 2019-06-07 NOTE — PLAN OF CARE
Problem: Fall Injury Risk  Goal: Absence of Fall and Fall-Related Injury  Outcome: Ongoing (interventions implemented as appropriate)  Pt will remain safe and free of falls or injury while under our care in hospital .

## 2019-06-07 NOTE — INTERVAL H&P NOTE
"The patient has been examined and the H&P has been reviewed:    I concur with the findings and no changes have occurred since H&P was written.    Anesthesia/Surgery risks, benefits and alternative options discussed and understood by patient/family.    /71 (BP Location: Left arm, Patient Position: Lying)   Pulse 73   Temp 97.7 °F (36.5 °C) (Skin)   Resp 20   Ht 5' 7" (1.702 m)   Wt 94.3 kg (208 lb)   LMP 05/08/2019 (Exact Date)   SpO2 98%   BMI 32.58 kg/m²   O POS    Lab Results   Component Value Date    WBC 8.87 05/31/2019    HGB 11.9 (L) 05/31/2019    HCT 37.3 05/31/2019    MCV 89 05/31/2019     (H) 05/31/2019         Will proceed to the OR for LSC BTL/bilateral salpingectomy    candace luke MD  "

## 2019-06-07 NOTE — DISCHARGE INSTRUCTIONS
1) Nothing per vagina until postop visit  2) Patient should report the following symptoms to MD or proceed to Emergency Room for evaluation: fever greater than 100.4F, persistent/heavy vaginal bleeding, abdominal pain not relieved with pain medications, persistent nausea and vomiting.  3) Patient can resume regular diet and activities    May remove dressings after 24 hours and shower. No tub baths.  Pat dry and apply bandaids over incisions as needed.        Discharge Instruction for Laparoscopic Fallopian Tube Ligation  Your doctor did a sterilization procedure to prevent any future pregnancies. This is a permanent form of birth control. Several different methods of surgical sterilization can be used to block the fallopian tubes. They all stop the egg from entering the womb (uterus) and sperm from traveling up to fertilize the egg. You had a laparoscopic procedure. The incisions on your abdomen may be tender or sore. You may also have pain in your upper back or shoulders. This is from the gas used to enlarge the abdomen to allow your doctor to see inside your pelvis and do the procedure. This pain usually goes away in a day or two.  Here's what you can do to speed your recovery after surgery.   Home care  · Take it easy. Stay quiet and rest for 2 days.  · Return to your normal activities after 48 hours. You may also return to work at that time.  · Eat a normal diet.  · If needed, take an over-the-counter pain reliever for pain.  · Don't lift anything heavier than 10 pounds for 1 week after the procedure.  · Don't drive for at least 24 hours after the procedure and until pain is minimal without taking opioids if prescribed  · Don't have sex for 2 weeks after surgery.  Don't use tampons.  Follow-up care  Make a follow-up appointment as directed by our staff.     When to call your healthcare provider  Call your healthcare provider right away if you have any of the following:  · Fever above 100.4°F (38°C) or higher, or  as directed by your healthcare provider  · Chills  · Dizziness or fainting  · Abdominal pain and swelling that get worse  · Nausea and vomiting  · Signs of infection. These include drainage, pus, warmth, or redness at your incision site.  · Sudden chest pain or shortness of breath   Date Last Reviewed: 5/19/2015  © 6573-6630 [employer]. [address] [place] [address]. All rights reserved. This information is not intended as a substitute for professional medical care. Always follow your healthcare professional's instructions.          ANESTHESIA  -For the first 24 hours after surgery:  Do not drive, use heavy equipment, make important decisions, or drink alcohol  -It is normal to feel sleepy for several hours.  Rest until you are more awake.  -Have someone stay with you, if needed.  They can watch for problems and help keep you safe.  -Some possible post anesthesia side effects include: nausea and vomiting, sore throat and hoarseness, sleepiness, and dizziness.    PAIN  -If you have pain after surgery, pain medicine will help you feel better.  Take it as directed, before pain becomes severe.  Most pain relievers taken by mouth need at least 20-30 minutes to start working.  -Do not drive or drink alcohol while taking pain medicine.  -Pain medication can upset your stomach.  Taking them with a little food may help.  -Other ways to help control pain: elevation, ice, and relaxation  -Call your surgeon if still having unmanageable pain an hour after taking pain medicine.  -Pain medicine can cause constipation.  Taking an over-the counter stool softener while on prescription pain medicine and drinking plenty of fluids can prevent this side effect.  -Call your surgeon if you have severe side effects like: breathing problems, trouble waking up, dizziness, confusion, or severe constipation.    NAUSEA  -Some people have nausea after surgery.  This is often because of anesthesia, pain, pain medicine,  or the stress of surgery.  -Do not push yourself to eat.  Start off with clear liquids and soup.  Slowly move to solid foods.  Don't eat fatty, rich, spicy foods at first.  Eat smaller amounts.  -If you develop persistent nausea and vomiting please notify your surgeon immediately.    BLEEDING  -Different types of surgery require different types of care and dressing changes.  It is important to follow all instructions and advice from your surgeon.  Change dressing as directed.  Call your surgeon for any concerns regarding postop bleeding.    SIGNS OF INFECTION  -Signs of infection include: fever, swelling, drainage, and redness  -Notify your surgeon if you have a fever of 100.4 F (38.0 C) or higher.  -Notify your surgeon if you notice redness, swelling, increased pain, pus, or a foul smell at the incision site.

## 2019-06-07 NOTE — ANESTHESIA POSTPROCEDURE EVALUATION
Anesthesia Post Evaluation    Patient: Deepa [place]    Procedure(s) Performed: Procedure(s) (LRB):  SALPINGECTOMY, LAPAROSCOPIC (Bilateral)    Final Anesthesia Type: general  Patient location during evaluation: PACU  Patient participation: Yes- Able to Participate  Level of consciousness: awake and alert, oriented and awake  Post-procedure vital signs: reviewed and stable  Pain management: adequate  Airway patency: patent  PONV status at discharge: No PONV  Anesthetic complications: no      Cardiovascular status: blood pressure returned to baseline  Respiratory status: unassisted and room air  Hydration status: euvolemic  Follow-up not needed.          Vitals Value Taken Time   /75 6/7/2019  8:46 AM   Temp 36.5 °C (97.7 °F) 6/7/2019  6:15 AM   Pulse 54 6/7/2019  8:46 AM   Resp 11 6/7/2019  8:46 AM   SpO2 98 % 6/7/2019  8:46 AM   Vitals shown include unvalidated device data.      No case tracking events are documented in the log.      Pain/Noreen Score: Pain Rating Prior to Med Admin: 6 (6/7/2019  8:34 AM)  Noreen Score: 9 (6/7/2019  8:25 AM)

## 2019-06-07 NOTE — TRANSFER OF CARE
"Anesthesia Transfer of Care Note    Patient: Deepa [place]    Procedure(s) Performed: Procedure(s) (LRB):  SALPINGECTOMY, LAPAROSCOPIC (Bilateral)    Patient location: PACU    Anesthesia Type: general    Transport from OR: Transported from OR on 6-10 L/min O2 by face mask with adequate spontaneous ventilation    Post pain: adequate analgesia    Post assessment: no apparent anesthetic complications    Post vital signs: stable    Level of consciousness: awake, alert and oriented    Nausea/Vomiting: no nausea/vomiting    Complications: none          Last vitals:   Visit Vitals  BP (!) 142/81   Pulse 72   Temp 36.5 °C (97.7 °F) (Skin)   Resp 18   Ht 5' 7" (1.702 m)   Wt 94.3 kg (208 lb)   LMP 05/08/2019 (Exact Date)   SpO2 100%   BMI 32.58 kg/m²     "

## 2019-06-07 NOTE — DISCHARGE SUMMARY
Admit Date 6/7/2019  Discharge date 6/7/2019    Admit Diagnosis: Desires permanent sterilization  Discharge Diagnosis: same    Hospital Course: The patient presented for surgery. Please see my operative note. No complications from the procedure. Raman.  She was first taken to the PACU for initial recovery. She was then taken to outpatient surgery for continuing recovery. Once she was able to tolerate PO and void spontaneously, she was deemed stable for discharge to home.    Disposition: stable, discharge to home    Labs:   Lab Results   Component Value Date    WBC 8.87 05/31/2019    HGB 11.9 (L) 05/31/2019    HCT 37.3 05/31/2019    MCV 89 05/31/2019     (H) 05/31/2019     Discharge Medications (rx provided at preop visit)  1) Motrin 800mg PO q 8 hours prn pain Quantity 30 Refills 2  2) Percocet 5/325mg one tablet PO q6hrs prn pain Quantity 20 Refills 0      Discharge Instructions  1) Patient should report the following symptoms to MD or proceed to Emergency Room for evaluation: fever greater than 100.4F, persistent/heavy vaginal bleeding, abdominal pain not relieved with pain medications, persistent nausea and vomiting.  2) Patient can resume regular diet.   3) No heavy lifting  4) F/u with Dr. Manzo in 2 wks    JENNIFER Manzo MD

## 2019-06-07 NOTE — OP NOTE
Operative Note    Date: 6/7/2019  Time: 7:57 AM  Preoperative Diagnosis: Desires permanent sterilization  Postoperative Diagnosis: same  Procedure: laparoscopic bilateral salpingectomy  Type of Anesthesia: general  Surgeon: Geronimo Manzo MD  Assistant Surgeon: john cerda  Specimen/Tissue Removed: right and left fallopian tubes  Estimated Blood Loss: min  Urine Output: 200 cc clear  IV Fluids: 1000cc LR  Complications: none  Findings: normal appearing tubes and ovaries bilaterally; normal appearing liver edge; normal appearing uterus    TECHNIQUE:  The patient was taken to the Operating Room where her general   anesthesia was found to be adequate.  Her legs were then placed in Bebo   stirrups.  She was then prepared and draped in normal sterile fashion.   Attention was turned to   the patient's abdomen.  An infraumbilical incision was made with the scalpel.  A   5 mm trocar was placed under direct laparoscopic supervision to the abdomen.    CO2 insufflation was turned on and opening pressure was noted to be appropriate.    Accessory trocars were placed laterally.  These were 5 mm trocars.  The   patient's right tube was identified to the fimbria.  The ligasure instrument was   then used to grasp through the mesosalpinx and the tube was successfully removed   using this instrument.  The tube was pulled through the 5 mm trocar and sent to   Pathology.  Attention was turned to the patient's left tube, which was   identified to the fimbria.  The ligasure instrument was used to coagulate and   then cut through the mesosalpinx.  The tube was then removed from the abdomen   through the 5 mm accessory trocar.    Hemostasis was noted.  All instruments were then removed from the abdomen.  All instruments were also removed from the vagina.    The cervix was noted to be hemostatic.    The patient tolerated the procedure well.  Sponge, lap and needle counts were   correct x3.  The patient received 2 g of Ancef prior to  starting of the surgery.    She was extubated successfully in the Operating Room and then taken to the   PACU in stable condition.        Medicaid tubal form signed in the OR      JENNIFER Manzo MD

## 2019-06-21 ENCOUNTER — OFFICE VISIT (OUTPATIENT)
Dept: OBSTETRICS AND GYNECOLOGY | Facility: CLINIC | Age: 24
End: 2019-06-21
Payer: MEDICAID

## 2019-06-21 VITALS
BODY MASS INDEX: 32.83 KG/M2 | WEIGHT: 209.19 LBS | HEIGHT: 67 IN | SYSTOLIC BLOOD PRESSURE: 108 MMHG | RESPIRATION RATE: 16 BRPM | DIASTOLIC BLOOD PRESSURE: 72 MMHG

## 2019-06-21 DIAGNOSIS — Z09 POSTOP CHECK: Primary | ICD-10-CM

## 2019-06-21 PROCEDURE — 99024 POSTOP FOLLOW-UP VISIT: CPT | Mod: ,,, | Performed by: OBSTETRICS & GYNECOLOGY

## 2019-06-21 PROCEDURE — 99213 OFFICE O/P EST LOW 20 MIN: CPT | Mod: PBBFAC,PO | Performed by: OBSTETRICS & GYNECOLOGY

## 2019-06-21 PROCEDURE — 99999 PR PBB SHADOW E&M-EST. PATIENT-LVL III: ICD-10-PCS | Mod: PBBFAC,,, | Performed by: OBSTETRICS & GYNECOLOGY

## 2019-06-21 PROCEDURE — 99024 PR POST-OP FOLLOW-UP VISIT: ICD-10-PCS | Mod: ,,, | Performed by: OBSTETRICS & GYNECOLOGY

## 2019-06-21 PROCEDURE — 99999 PR PBB SHADOW E&M-EST. PATIENT-LVL III: CPT | Mod: PBBFAC,,, | Performed by: OBSTETRICS & GYNECOLOGY

## 2019-06-21 NOTE — PROGRESS NOTES
"Postop     Patient presents for postoperative visit today. She is s/p LSC bilateral salpingectomy for permanent sterilization on 6/7/19. Patient denies feverand chills. She denies constipation and diarrhea. Denies pain. Patient does report pain with moving her head side to side. Has a hard time turning head all the way to the right. Will also feel burning sensation.     ROS: per HPI     PE:   Vitals: /72   Resp 16   Ht 5' 7" (1.702 m)   Wt 94.9 kg (209 lb 3.5 oz)   LMP 06/18/2019   BMI 32.77 kg/m²   APPEARANCE: Well nourished, well developed, in no acute distress.  ABDOMEN: Soft. No tenderness or masses. Incision c/d/i x 3  PELVIC: deferred    A/P:   1) Postop:  -patient healing well from procedure/surgery  -continue pain meds as needed   -benign pathology discussed with the patient - copy of pathology results discussed with the patient   -copy of pix provided    2) Contraception: s/p BTL    Recommend massage of muscles to help with "neck problem" - if this is not improved, patient counseled to consider specialist to check for bulging discs in the back/spine  She will contact me if she needs assistance with this referally    F/u in Feb 2020 for annual exam/pap smear    JENNIFER Manzo MD      "

## 2019-10-08 ENCOUNTER — OFFICE VISIT (OUTPATIENT)
Dept: OBSTETRICS AND GYNECOLOGY | Facility: CLINIC | Age: 24
End: 2019-10-08
Payer: MEDICAID

## 2019-10-08 VITALS — SYSTOLIC BLOOD PRESSURE: 138 MMHG | BODY MASS INDEX: 31.8 KG/M2 | WEIGHT: 203.06 LBS | DIASTOLIC BLOOD PRESSURE: 60 MMHG

## 2019-10-08 DIAGNOSIS — N76.0 ACUTE VAGINITIS: Primary | ICD-10-CM

## 2019-10-08 PROCEDURE — 87481 CANDIDA DNA AMP PROBE: CPT | Mod: 59

## 2019-10-08 PROCEDURE — 87491 CHLMYD TRACH DNA AMP PROBE: CPT | Mod: 59

## 2019-10-08 PROCEDURE — 99212 OFFICE O/P EST SF 10 MIN: CPT | Mod: S$PBB,,, | Performed by: OBSTETRICS & GYNECOLOGY

## 2019-10-08 PROCEDURE — 99999 PR PBB SHADOW E&M-EST. PATIENT-LVL III: CPT | Mod: PBBFAC,,, | Performed by: OBSTETRICS & GYNECOLOGY

## 2019-10-08 PROCEDURE — 99212 PR OFFICE/OUTPT VISIT, EST, LEVL II, 10-19 MIN: ICD-10-PCS | Mod: S$PBB,,, | Performed by: OBSTETRICS & GYNECOLOGY

## 2019-10-08 PROCEDURE — 99999 PR PBB SHADOW E&M-EST. PATIENT-LVL III: ICD-10-PCS | Mod: PBBFAC,,, | Performed by: OBSTETRICS & GYNECOLOGY

## 2019-10-08 PROCEDURE — 87801 DETECT AGNT MULT DNA AMPLI: CPT

## 2019-10-08 PROCEDURE — 99213 OFFICE O/P EST LOW 20 MIN: CPT | Mod: PBBFAC,PO | Performed by: OBSTETRICS & GYNECOLOGY

## 2019-10-08 RX ORDER — METRONIDAZOLE 500 MG/1
TABLET ORAL
Qty: 30 TABLET | Refills: 4 | Status: SHIPPED | OUTPATIENT
Start: 2019-10-08 | End: 2021-04-07

## 2019-10-08 NOTE — PROGRESS NOTES
25 yo female who presents for evaluation of vaginal discharge.  Reports discharge with slight odor around the time of her last menstrual cycle.  Discharge has improved slightly since cycle ended - but she still wants to be evaluated.  Also wants STD testing.  On vaginal exam, there is white discharge.  Bimanual deferred    AP: vaginitis  Gc/chl and affirm collected  rx for flagyl provided    candace luke MD

## 2019-10-09 LAB
BACTERIAL VAGINOSIS DNA: POSITIVE
C TRACH DNA SPEC QL NAA+PROBE: NOT DETECTED
CANDIDA GLABRATA DNA: NEGATIVE
CANDIDA KRUSEI DNA: NEGATIVE
CANDIDA RRNA VAG QL PROBE: NEGATIVE
N GONORRHOEA DNA SPEC QL NAA+PROBE: NOT DETECTED
T VAGINALIS RRNA GENITAL QL PROBE: NEGATIVE

## 2020-01-14 ENCOUNTER — TELEPHONE (OUTPATIENT)
Dept: OBSTETRICS AND GYNECOLOGY | Facility: CLINIC | Age: 25
End: 2020-01-14

## 2020-03-16 ENCOUNTER — OFFICE VISIT (OUTPATIENT)
Dept: OBSTETRICS AND GYNECOLOGY | Facility: CLINIC | Age: 25
End: 2020-03-16
Payer: MEDICAID

## 2020-03-16 VITALS
SYSTOLIC BLOOD PRESSURE: 122 MMHG | WEIGHT: 182.13 LBS | DIASTOLIC BLOOD PRESSURE: 70 MMHG | BODY MASS INDEX: 28.52 KG/M2

## 2020-03-16 DIAGNOSIS — R30.0 DYSURIA: ICD-10-CM

## 2020-03-16 DIAGNOSIS — Z01.419 ROUTINE GYNECOLOGICAL EXAMINATION: Primary | ICD-10-CM

## 2020-03-16 DIAGNOSIS — Z12.4 CERVICAL CANCER SCREENING: ICD-10-CM

## 2020-03-16 PROCEDURE — 99999 PR PBB SHADOW E&M-EST. PATIENT-LVL III: CPT | Mod: PBBFAC,,, | Performed by: OBSTETRICS & GYNECOLOGY

## 2020-03-16 PROCEDURE — 99395 PREV VISIT EST AGE 18-39: CPT | Mod: S$PBB,,, | Performed by: OBSTETRICS & GYNECOLOGY

## 2020-03-16 PROCEDURE — 87086 URINE CULTURE/COLONY COUNT: CPT

## 2020-03-16 PROCEDURE — 87491 CHLMYD TRACH DNA AMP PROBE: CPT

## 2020-03-16 PROCEDURE — 99395 PR PREVENTIVE VISIT,EST,18-39: ICD-10-PCS | Mod: S$PBB,,, | Performed by: OBSTETRICS & GYNECOLOGY

## 2020-03-16 PROCEDURE — 99999 PR PBB SHADOW E&M-EST. PATIENT-LVL III: ICD-10-PCS | Mod: PBBFAC,,, | Performed by: OBSTETRICS & GYNECOLOGY

## 2020-03-16 PROCEDURE — 87481 CANDIDA DNA AMP PROBE: CPT | Mod: 59

## 2020-03-16 PROCEDURE — 87661 TRICHOMONAS VAGINALIS AMPLIF: CPT

## 2020-03-16 PROCEDURE — 99213 OFFICE O/P EST LOW 20 MIN: CPT | Mod: PBBFAC,PO | Performed by: OBSTETRICS & GYNECOLOGY

## 2020-03-16 PROCEDURE — 88175 CYTOPATH C/V AUTO FLUID REDO: CPT

## 2020-03-16 NOTE — PROGRESS NOTES
24 yo  female who presents for routine gyn visit.  Reports that cycles are q month.  No concerns about her cycle.  Patient reports one week of abdominal pain (mostly on the left side).  Pain comes and goes. Does not last long.  Has not tried OTC meds.  Patient concerned about having urine infection or STD.  Reports that her partner has had another partner.  She wants to be tested for infections.    ROS:  GENERAL: Denies weight gain or weight loss. Feeling well overall.   SKIN: Denies rash or lesions.   HEAD: Denies head injury or headache.   CHEST: Denies chest pain or shortness of breath.   CARDIOVASCULAR: Denies palpitations or left sided chest pain.   ABDOMEN: positive LLQ pain  URINARY: dysuria  REPRODUCTIVE: See HPI.   BREASTS:  denies pain, lumps, or nipple discharge.   HEMATOLOGIC: No easy bruisability or excessive bleeding.   MUSCULOSKELETAL: Denies joint pain or swelling.   NEUROLOGIC: Denies syncope or weakness.   PSYCHIATRIC: Denies depression, anxiety or mood swings.         PE:   Vitals: /70   Wt 82.6 kg (182 lb 1.6 oz)   LMP 2020 (Exact Date)   Breastfeeding? No   BMI 28.52 kg/m²   APPEARANCE: Well nourished, well developed, in no acute distress.  SKIN: Normal skin turgor, no lesions.  CHEST: Lungs clear to auscultation.  HEART: Regular rate and rhythm, no murmurs, rubs or gallops.  ABDOMEN: Soft. No tenderness or masses. No hepatosplenomegaly. No hernias.  BREASTS: Symmetrical, no skin changes or visible lesions. No palpable masses, nipple discharge or adenopathy bilaterally.  PELVIC: Normal external female genitalia without lesions. Normal hair distribution. Adequate perineal body, normal urethral meatus. Vagina moist and well rugated without lesions or discharge. Cervix pink and without lesions. No significant cystocele or rectocele. Bimanual exam showed uterus normal size, shape, position, mobile and nontender. Adnexa without masses or tenderness. Urethra and bladder  normal.    AP  Routine gyn  -s/p normal breast exam:   -s/p normal pelvic exam:   -Pap collected  -STD testing:  Gc/chl and affirm  -contraception: s/p BTL  -dysuria: urine cx collected

## 2020-03-17 LAB
C TRACH DNA SPEC QL NAA+PROBE: NOT DETECTED
N GONORRHOEA DNA SPEC QL NAA+PROBE: NOT DETECTED

## 2020-03-18 LAB
BACTERIA UR CULT: NO GROWTH
BACTERIAL VAGINOSIS DNA: NEGATIVE
CANDIDA GLABRATA DNA: NEGATIVE
CANDIDA KRUSEI DNA: NEGATIVE
CANDIDA RRNA VAG QL PROBE: NEGATIVE
T VAGINALIS RRNA GENITAL QL PROBE: NEGATIVE

## 2020-03-24 ENCOUNTER — DOCUMENTATION ONLY (OUTPATIENT)
Dept: REHABILITATION | Facility: HOSPITAL | Age: 25
End: 2020-03-24

## 2020-03-24 DIAGNOSIS — R26.89 DECREASED FUNCTIONAL MOBILITY: ICD-10-CM

## 2020-03-24 DIAGNOSIS — R53.1 DECREASED STRENGTH: ICD-10-CM

## 2020-03-24 DIAGNOSIS — M25.669 DECREASED RANGE OF MOTION OF KNEE, UNSPECIFIED LATERALITY: ICD-10-CM

## 2020-03-24 DIAGNOSIS — M25.561 RIGHT KNEE PAIN, UNSPECIFIED CHRONICITY: ICD-10-CM

## 2020-03-24 DIAGNOSIS — Z98.51 S/P TUBAL LIGATION: ICD-10-CM

## 2020-03-31 ENCOUNTER — PATIENT MESSAGE (OUTPATIENT)
Dept: OBSTETRICS AND GYNECOLOGY | Facility: CLINIC | Age: 25
End: 2020-03-31

## 2020-03-31 DIAGNOSIS — B96.89 BV (BACTERIAL VAGINOSIS): Primary | ICD-10-CM

## 2020-03-31 DIAGNOSIS — N76.0 BV (BACTERIAL VAGINOSIS): Primary | ICD-10-CM

## 2020-03-31 RX ORDER — METRONIDAZOLE 500 MG/1
500 TABLET ORAL
Qty: 14 TABLET | Refills: 0 | Status: SHIPPED | OUTPATIENT
Start: 2020-03-31 | End: 2020-04-07

## 2020-04-06 LAB
FINAL PATHOLOGIC DIAGNOSIS: NORMAL
Lab: NORMAL

## 2020-06-02 ENCOUNTER — OFFICE VISIT (OUTPATIENT)
Dept: OBSTETRICS AND GYNECOLOGY | Facility: CLINIC | Age: 25
End: 2020-06-02
Payer: MEDICAID

## 2020-06-02 ENCOUNTER — LAB VISIT (OUTPATIENT)
Dept: LAB | Facility: HOSPITAL | Age: 25
End: 2020-06-02
Attending: OBSTETRICS & GYNECOLOGY
Payer: MEDICAID

## 2020-06-02 VITALS
DIASTOLIC BLOOD PRESSURE: 72 MMHG | SYSTOLIC BLOOD PRESSURE: 102 MMHG | WEIGHT: 177.25 LBS | BODY MASS INDEX: 27.82 KG/M2 | HEIGHT: 67 IN

## 2020-06-02 DIAGNOSIS — Z11.3 VENEREAL DISEASE SCREENING: Primary | ICD-10-CM

## 2020-06-02 DIAGNOSIS — Z11.3 VENEREAL DISEASE SCREENING: ICD-10-CM

## 2020-06-02 PROCEDURE — 87491 CHLMYD TRACH DNA AMP PROBE: CPT

## 2020-06-02 PROCEDURE — 87340 HEPATITIS B SURFACE AG IA: CPT

## 2020-06-02 PROCEDURE — 99213 OFFICE O/P EST LOW 20 MIN: CPT | Mod: PBBFAC,PO | Performed by: OBSTETRICS & GYNECOLOGY

## 2020-06-02 PROCEDURE — 86703 HIV-1/HIV-2 1 RESULT ANTBDY: CPT

## 2020-06-02 PROCEDURE — 99212 PR OFFICE/OUTPT VISIT, EST, LEVL II, 10-19 MIN: ICD-10-PCS | Mod: S$PBB,,, | Performed by: OBSTETRICS & GYNECOLOGY

## 2020-06-02 PROCEDURE — 86696 HERPES SIMPLEX TYPE 2 TEST: CPT

## 2020-06-02 PROCEDURE — 99999 PR PBB SHADOW E&M-EST. PATIENT-LVL III: CPT | Mod: PBBFAC,,, | Performed by: OBSTETRICS & GYNECOLOGY

## 2020-06-02 PROCEDURE — 86592 SYPHILIS TEST NON-TREP QUAL: CPT

## 2020-06-02 PROCEDURE — 99212 OFFICE O/P EST SF 10 MIN: CPT | Mod: S$PBB,,, | Performed by: OBSTETRICS & GYNECOLOGY

## 2020-06-02 PROCEDURE — 36415 COLL VENOUS BLD VENIPUNCTURE: CPT

## 2020-06-02 PROCEDURE — 99999 PR PBB SHADOW E&M-EST. PATIENT-LVL III: ICD-10-PCS | Mod: PBBFAC,,, | Performed by: OBSTETRICS & GYNECOLOGY

## 2020-06-02 PROCEDURE — 86706 HEP B SURFACE ANTIBODY: CPT

## 2020-06-02 PROCEDURE — 86803 HEPATITIS C AB TEST: CPT

## 2020-06-02 NOTE — PROGRESS NOTES
Patient wants STD testing.   Reports new sex partner 2 weeks ago.  No condom use.  Wants STD testing.  Wants everything done.  S/p BTL for contraception.    candace luke MD

## 2020-06-03 LAB
HBV SURFACE AB SER-ACNC: POSITIVE M[IU]/ML
HBV SURFACE AG SERPL QL IA: NEGATIVE
HCV AB SERPL QL IA: NEGATIVE
HIV 1+2 AB+HIV1 P24 AG SERPL QL IA: NEGATIVE
RPR SER QL: NORMAL

## 2020-06-04 LAB
C TRACH DNA SPEC QL NAA+PROBE: NOT DETECTED
N GONORRHOEA DNA SPEC QL NAA+PROBE: NOT DETECTED

## 2020-06-08 ENCOUNTER — TELEPHONE (OUTPATIENT)
Dept: OBSTETRICS AND GYNECOLOGY | Facility: CLINIC | Age: 25
End: 2020-06-08

## 2020-06-08 NOTE — TELEPHONE ENCOUNTER
Sent patient my chart message giving her results for herpes. Tried calling but voice mail was not set up.

## 2020-06-09 LAB
HSV1 IGG SERPL QL IA: ABNORMAL
HSV2 IGG SERPL QL IA: POSITIVE

## 2020-10-24 ENCOUNTER — PATIENT MESSAGE (OUTPATIENT)
Dept: OBSTETRICS AND GYNECOLOGY | Facility: CLINIC | Age: 25
End: 2020-10-24

## 2020-10-26 DIAGNOSIS — B96.89 BV (BACTERIAL VAGINOSIS): Primary | ICD-10-CM

## 2020-10-26 DIAGNOSIS — N76.0 BV (BACTERIAL VAGINOSIS): Primary | ICD-10-CM

## 2020-10-26 RX ORDER — METRONIDAZOLE 500 MG/1
500 TABLET ORAL
Qty: 14 TABLET | Refills: 0 | Status: SHIPPED | OUTPATIENT
Start: 2020-10-26 | End: 2020-11-02

## 2020-11-20 ENCOUNTER — OFFICE VISIT (OUTPATIENT)
Dept: OBSTETRICS AND GYNECOLOGY | Facility: CLINIC | Age: 25
End: 2020-11-20
Payer: MEDICAID

## 2020-11-20 VITALS — DIASTOLIC BLOOD PRESSURE: 60 MMHG | SYSTOLIC BLOOD PRESSURE: 122 MMHG | BODY MASS INDEX: 28.04 KG/M2 | WEIGHT: 179 LBS

## 2020-11-20 DIAGNOSIS — N89.8 VAGINAL DISCHARGE: Primary | ICD-10-CM

## 2020-11-20 DIAGNOSIS — L29.2 VULVAR ITCHING: ICD-10-CM

## 2020-11-20 PROCEDURE — 99213 OFFICE O/P EST LOW 20 MIN: CPT | Mod: PBBFAC,PO | Performed by: OBSTETRICS & GYNECOLOGY

## 2020-11-20 PROCEDURE — 99999 PR PBB SHADOW E&M-EST. PATIENT-LVL III: CPT | Mod: PBBFAC,,, | Performed by: OBSTETRICS & GYNECOLOGY

## 2020-11-20 PROCEDURE — 99212 PR OFFICE/OUTPT VISIT, EST, LEVL II, 10-19 MIN: ICD-10-PCS | Mod: S$PBB,,, | Performed by: OBSTETRICS & GYNECOLOGY

## 2020-11-20 PROCEDURE — 99212 OFFICE O/P EST SF 10 MIN: CPT | Mod: S$PBB,,, | Performed by: OBSTETRICS & GYNECOLOGY

## 2020-11-20 PROCEDURE — 99999 PR PBB SHADOW E&M-EST. PATIENT-LVL III: ICD-10-PCS | Mod: PBBFAC,,, | Performed by: OBSTETRICS & GYNECOLOGY

## 2020-11-20 RX ORDER — KETOCONAZOLE 20 MG/ML
SHAMPOO, SUSPENSION TOPICAL
Qty: 120 ML | Refills: 4 | Status: SHIPPED | OUTPATIENT
Start: 2020-11-23 | End: 2020-11-30 | Stop reason: SDUPTHER

## 2020-11-20 NOTE — PROGRESS NOTES
26 yo female concerned about vaginal discharge and vulvar itching after shaving.  Would like to have affirm.  Concerned about gc/chl - but informed that I am unable to complete this lab today because lab not completing gc/chl tests right now.  On vulvar, there is a white area at the apex. Scaly. No ulcers noted.  On vaginal exam, cervix appears normal. No discharge noted.  Affirm collected.    AP:   Vaginitis/vulvar itching  Affirm collected  rx for ketoconazole shampoo provided    candace luke MD

## 2020-11-23 ENCOUNTER — PATIENT MESSAGE (OUTPATIENT)
Dept: OBSTETRICS AND GYNECOLOGY | Facility: CLINIC | Age: 25
End: 2020-11-23

## 2020-11-23 ENCOUNTER — TELEPHONE (OUTPATIENT)
Dept: OBSTETRICS AND GYNECOLOGY | Facility: CLINIC | Age: 25
End: 2020-11-23

## 2020-11-23 RX ORDER — AZITHROMYCIN 1 G/1
1 POWDER, FOR SUSPENSION ORAL
Status: SHIPPED | OUTPATIENT
Start: 2020-11-23

## 2020-11-23 NOTE — TELEPHONE ENCOUNTER
Called lab-crop back after I had spoke to someone at 11 a.m about having this bv picked up because it would be bad after 72 hours-No one has picked specimen up. Was told this wasn't a stat issue and will be picked up normally. I will need to advise pt that we may have to re-swab her before end of the year.

## 2020-11-24 RX ORDER — AZITHROMYCIN 250 MG/1
TABLET, FILM COATED ORAL
Qty: 2 TABLET | Refills: 0 | Status: SHIPPED | OUTPATIENT
Start: 2020-11-24 | End: 2020-11-28

## 2020-11-25 ENCOUNTER — TELEPHONE (OUTPATIENT)
Dept: OBSTETRICS AND GYNECOLOGY | Facility: CLINIC | Age: 25
End: 2020-11-25

## 2020-11-25 ENCOUNTER — PATIENT MESSAGE (OUTPATIENT)
Dept: OBSTETRICS AND GYNECOLOGY | Facility: CLINIC | Age: 25
End: 2020-11-25

## 2020-11-25 LAB
CANDIDA RRNA VAG QL PROBE: NEGATIVE
G VAGINALIS RRNA GENITAL QL PROBE: NEGATIVE
T VAGINALIS RRNA GENITAL QL PROBE: NEGATIVE

## 2020-11-30 ENCOUNTER — PATIENT MESSAGE (OUTPATIENT)
Dept: OBSTETRICS AND GYNECOLOGY | Facility: CLINIC | Age: 25
End: 2020-11-30

## 2020-11-30 DIAGNOSIS — L29.2 VULVAR ITCHING: ICD-10-CM

## 2020-11-30 RX ORDER — KETOCONAZOLE 20 MG/ML
SHAMPOO, SUSPENSION TOPICAL
Qty: 120 ML | Refills: 4 | Status: SHIPPED | OUTPATIENT
Start: 2020-11-30 | End: 2021-04-07

## 2020-11-30 NOTE — TELEPHONE ENCOUNTER
Her affirm came back negative.  So, I don't think we need to repeat the swab.  She does not need another visit before the end of the year.    Dr luke

## 2021-01-06 ENCOUNTER — PATIENT MESSAGE (OUTPATIENT)
Dept: OBSTETRICS AND GYNECOLOGY | Facility: CLINIC | Age: 26
End: 2021-01-06

## 2021-01-06 DIAGNOSIS — R30.0 DYSURIA: Primary | ICD-10-CM

## 2021-01-06 RX ORDER — CIPROFLOXACIN 250 MG/1
250 TABLET, FILM COATED ORAL 2 TIMES DAILY
Qty: 6 TABLET | Refills: 0 | Status: SHIPPED | OUTPATIENT
Start: 2021-01-06 | End: 2021-01-09

## 2021-01-15 ENCOUNTER — OFFICE VISIT (OUTPATIENT)
Dept: OBSTETRICS AND GYNECOLOGY | Facility: CLINIC | Age: 26
End: 2021-01-15
Payer: MEDICAID

## 2021-01-15 VITALS
DIASTOLIC BLOOD PRESSURE: 52 MMHG | WEIGHT: 186.31 LBS | BODY MASS INDEX: 29.18 KG/M2 | SYSTOLIC BLOOD PRESSURE: 120 MMHG

## 2021-01-15 DIAGNOSIS — N89.8 VAGINAL DISCHARGE: Primary | ICD-10-CM

## 2021-01-15 PROCEDURE — 99213 OFFICE O/P EST LOW 20 MIN: CPT | Mod: PBBFAC,PO | Performed by: OBSTETRICS & GYNECOLOGY

## 2021-01-15 PROCEDURE — 87591 N.GONORRHOEAE DNA AMP PROB: CPT

## 2021-01-15 PROCEDURE — 99999 PR PBB SHADOW E&M-EST. PATIENT-LVL III: ICD-10-PCS | Mod: PBBFAC,,, | Performed by: OBSTETRICS & GYNECOLOGY

## 2021-01-15 PROCEDURE — 99999 PR PBB SHADOW E&M-EST. PATIENT-LVL III: CPT | Mod: PBBFAC,,, | Performed by: OBSTETRICS & GYNECOLOGY

## 2021-01-15 PROCEDURE — 99212 OFFICE O/P EST SF 10 MIN: CPT | Mod: S$PBB,,, | Performed by: OBSTETRICS & GYNECOLOGY

## 2021-01-15 PROCEDURE — 99212 PR OFFICE/OUTPT VISIT, EST, LEVL II, 10-19 MIN: ICD-10-PCS | Mod: S$PBB,,, | Performed by: OBSTETRICS & GYNECOLOGY

## 2021-01-15 PROCEDURE — 87491 CHLMYD TRACH DNA AMP PROBE: CPT

## 2021-01-20 LAB
C TRACH DNA SPEC QL NAA+PROBE: NOT DETECTED
N GONORRHOEA DNA SPEC QL NAA+PROBE: NOT DETECTED

## 2021-02-03 ENCOUNTER — PATIENT MESSAGE (OUTPATIENT)
Dept: OBSTETRICS AND GYNECOLOGY | Facility: CLINIC | Age: 26
End: 2021-02-03

## 2021-02-03 DIAGNOSIS — B37.2 SKIN YEAST INFECTION: ICD-10-CM

## 2021-02-03 DIAGNOSIS — R30.0 DYSURIA: Primary | ICD-10-CM

## 2021-02-03 RX ORDER — CIPROFLOXACIN 250 MG/1
250 TABLET, FILM COATED ORAL 2 TIMES DAILY
Qty: 6 TABLET | Refills: 0 | Status: SHIPPED | OUTPATIENT
Start: 2021-02-03 | End: 2021-02-06

## 2021-02-03 RX ORDER — KETOCONAZOLE 20 MG/ML
SHAMPOO, SUSPENSION TOPICAL
Qty: 120 ML | Refills: 4 | Status: SHIPPED | OUTPATIENT
Start: 2021-02-04

## 2021-03-12 ENCOUNTER — OFFICE VISIT (OUTPATIENT)
Dept: OBSTETRICS AND GYNECOLOGY | Facility: CLINIC | Age: 26
End: 2021-03-12
Payer: MEDICAID

## 2021-03-12 VITALS
DIASTOLIC BLOOD PRESSURE: 56 MMHG | BODY MASS INDEX: 27.96 KG/M2 | WEIGHT: 178.13 LBS | HEIGHT: 67 IN | SYSTOLIC BLOOD PRESSURE: 100 MMHG

## 2021-03-12 DIAGNOSIS — Z13.1 DIABETES MELLITUS SCREENING: ICD-10-CM

## 2021-03-12 DIAGNOSIS — N76.1 CHRONIC VAGINITIS: Primary | ICD-10-CM

## 2021-03-12 PROCEDURE — 99999 PR PBB SHADOW E&M-EST. PATIENT-LVL III: CPT | Mod: PBBFAC,,, | Performed by: OBSTETRICS & GYNECOLOGY

## 2021-03-12 PROCEDURE — 99212 PR OFFICE/OUTPT VISIT, EST, LEVL II, 10-19 MIN: ICD-10-PCS | Mod: S$PBB,,, | Performed by: OBSTETRICS & GYNECOLOGY

## 2021-03-12 PROCEDURE — 87591 N.GONORRHOEAE DNA AMP PROB: CPT | Mod: 59 | Performed by: OBSTETRICS & GYNECOLOGY

## 2021-03-12 PROCEDURE — 99213 OFFICE O/P EST LOW 20 MIN: CPT | Mod: PBBFAC,PO | Performed by: OBSTETRICS & GYNECOLOGY

## 2021-03-12 PROCEDURE — 99212 OFFICE O/P EST SF 10 MIN: CPT | Mod: S$PBB,,, | Performed by: OBSTETRICS & GYNECOLOGY

## 2021-03-12 PROCEDURE — 87491 CHLMYD TRACH DNA AMP PROBE: CPT | Mod: 59 | Performed by: OBSTETRICS & GYNECOLOGY

## 2021-03-12 PROCEDURE — 87481 CANDIDA DNA AMP PROBE: CPT | Mod: 59 | Performed by: OBSTETRICS & GYNECOLOGY

## 2021-03-12 PROCEDURE — 99999 PR PBB SHADOW E&M-EST. PATIENT-LVL III: ICD-10-PCS | Mod: PBBFAC,,, | Performed by: OBSTETRICS & GYNECOLOGY

## 2021-03-12 RX ORDER — FLUCONAZOLE 150 MG/1
150 TABLET ORAL
Qty: 3 TABLET | Refills: 12 | Status: SHIPPED | OUTPATIENT
Start: 2021-03-12

## 2021-03-15 LAB
BACTERIAL VAGINOSIS DNA: NEGATIVE
CANDIDA GLABRATA DNA: NEGATIVE
CANDIDA KRUSEI DNA: NEGATIVE
CANDIDA RRNA VAG QL PROBE: NEGATIVE
T VAGINALIS RRNA GENITAL QL PROBE: NEGATIVE

## 2021-03-16 LAB
C TRACH DNA SPEC QL NAA+PROBE: NOT DETECTED
N GONORRHOEA DNA SPEC QL NAA+PROBE: NOT DETECTED

## 2021-04-06 ENCOUNTER — TELEPHONE (OUTPATIENT)
Dept: ORTHOPEDICS | Facility: CLINIC | Age: 26
End: 2021-04-06

## 2021-04-06 DIAGNOSIS — M25.561 ACUTE PAIN OF RIGHT KNEE: Primary | ICD-10-CM

## 2021-04-07 ENCOUNTER — OFFICE VISIT (OUTPATIENT)
Dept: ORTHOPEDICS | Facility: CLINIC | Age: 26
End: 2021-04-07
Payer: MEDICAID

## 2021-04-07 ENCOUNTER — HOSPITAL ENCOUNTER (OUTPATIENT)
Dept: RADIOLOGY | Facility: HOSPITAL | Age: 26
Discharge: HOME | End: 2021-04-07
Attending: ORTHOPAEDIC SURGERY
Payer: MEDICAID

## 2021-04-07 ENCOUNTER — PATIENT MESSAGE (OUTPATIENT)
Dept: OBSTETRICS AND GYNECOLOGY | Facility: CLINIC | Age: 26
End: 2021-04-07

## 2021-04-07 VITALS
DIASTOLIC BLOOD PRESSURE: 74 MMHG | HEART RATE: 62 BPM | WEIGHT: 175.94 LBS | HEIGHT: 67 IN | SYSTOLIC BLOOD PRESSURE: 125 MMHG | BODY MASS INDEX: 27.61 KG/M2

## 2021-04-07 DIAGNOSIS — Z98.890 S/P MCL (MEDIAL COLLATERAL LIGAMENT) REPAIR: ICD-10-CM

## 2021-04-07 DIAGNOSIS — Z98.890 S/P ACL RECONSTRUCTION: Primary | ICD-10-CM

## 2021-04-07 DIAGNOSIS — Z00.00 ROUTINE HEALTH MAINTENANCE: ICD-10-CM

## 2021-04-07 DIAGNOSIS — T84.84XA PAINFUL ORTHOPAEDIC HARDWARE: ICD-10-CM

## 2021-04-07 DIAGNOSIS — M25.561 ACUTE PAIN OF RIGHT KNEE: ICD-10-CM

## 2021-04-07 DIAGNOSIS — S83.521S: ICD-10-CM

## 2021-04-07 PROCEDURE — 73564 X-RAY EXAM KNEE 4 OR MORE: CPT | Mod: 26,RT,, | Performed by: RADIOLOGY

## 2021-04-07 PROCEDURE — 99214 PR OFFICE/OUTPT VISIT, EST, LEVL IV, 30-39 MIN: ICD-10-PCS | Mod: S$PBB,,, | Performed by: ORTHOPAEDIC SURGERY

## 2021-04-07 PROCEDURE — 73564 XR KNEE COMP 4 OR MORE VIEWS RIGHT: ICD-10-PCS | Mod: 26,RT,, | Performed by: RADIOLOGY

## 2021-04-07 PROCEDURE — 99214 OFFICE O/P EST MOD 30 MIN: CPT | Mod: S$PBB,,, | Performed by: ORTHOPAEDIC SURGERY

## 2021-04-07 PROCEDURE — 99999 PR PBB SHADOW E&M-EST. PATIENT-LVL III: CPT | Mod: PBBFAC,,, | Performed by: ORTHOPAEDIC SURGERY

## 2021-04-07 PROCEDURE — 73564 X-RAY EXAM KNEE 4 OR MORE: CPT | Mod: TC,FY,RT

## 2021-04-07 PROCEDURE — 99999 PR PBB SHADOW E&M-EST. PATIENT-LVL III: ICD-10-PCS | Mod: PBBFAC,,, | Performed by: ORTHOPAEDIC SURGERY

## 2021-04-07 PROCEDURE — 99213 OFFICE O/P EST LOW 20 MIN: CPT | Mod: PBBFAC,25,PN | Performed by: ORTHOPAEDIC SURGERY

## 2021-08-03 ENCOUNTER — CLINICAL SUPPORT (OUTPATIENT)
Dept: URGENT CARE | Facility: CLINIC | Age: 26
End: 2021-08-03
Payer: MEDICAID

## 2021-08-03 DIAGNOSIS — Z20.822 ENCOUNTER FOR LABORATORY TESTING FOR COVID-19 VIRUS: Primary | ICD-10-CM

## 2021-08-03 LAB
CTP QC/QA: YES
SARS-COV-2 RDRP RESP QL NAA+PROBE: NEGATIVE

## 2021-08-03 PROCEDURE — U0002: ICD-10-PCS | Mod: QW,S$GLB,, | Performed by: PHYSICIAN ASSISTANT

## 2021-08-03 PROCEDURE — U0002 COVID-19 LAB TEST NON-CDC: HCPCS | Mod: QW,S$GLB,, | Performed by: PHYSICIAN ASSISTANT

## 2022-02-18 NOTE — PROGRESS NOTES
Physical Therapy Daily Treatment Note     [facility]  [id number]    Therapy Diagnosis:   Encounter Diagnoses   Name Primary?    Right knee pain, unspecified chronicity     Decreased range of motion (ROM) of knee     Decreased strength     Decreased functional mobility      Physician: Phill Tran MD    Visit Date: 9/12/2018    Physician Orders: PT Eval and Treat   Medical Diagnosis from Referral: Rupture of anterior cruciate ligament of right knee  Evaluation Date: 9/10/2018  Authorization Period Expiration: 12/31/2018  Plan of Care Expiration: 9/10/18 - 12/10/18  Visit # / Visits authorized: 1/12     Time In: 1000  Time Out: 1100  Total Billable Time: 30 minutes (TE-2)     Precautions: Standard and Asthma    Subjective     Pt reports: she has increased swelling today.  She was compliant with home exercise program.  Response to previous treatment: no adverse reaction  Functional change: no change    Pain: 5/10  Location: right knee      Objective     Deepa received therapeutic exercises to develop strength, endurance and ROM for 8 minutes including:  And supervised x 20'    QS w/ estim x 8'  Hip 4-way: 3x10 R with brace locked in extension  Active heel slides: NEXT    Deepa received the following manual therapy techniques: Joint mobilizations, Myofacial release, Manual Lymphatic Drainage and Soft tissue Mobilization were applied to the: R LE for 22 minutes, including:  STM/MFR for edema reduction, STM/MFR to R quad, HS, and gastroc, patellar mobs in all directions    Deepa received cold pack for 10 minutes to R knee.      Home Exercises Provided and Patient Education Provided     Education provided:   - continue icing and elevating for decreased swelling    Written Home Exercises Provided: Patient instructed to cont prior HEP.  Exercises were reviewed and Deepa was able to demonstrate them prior to the end of the session.  Depea demonstrated good  understanding of  the education provided.     See EMR under Patient Instructions for exercises provided prior visit.    Assessment     Pt tolerated treatment well with no c/o pain or discomfort.    Deepa is progressing well towards her goals.   Pt prognosis is Good.     Pt will continue to benefit from skilled outpatient physical therapy to address the deficits listed in the problem list box on initial evaluation, provide pt/family education and to maximize pt's level of independence in the home and community environment.     Pt's spiritual, cultural and educational needs considered and pt agreeable to plan of care and goals.    Anticipated barriers to physical therapy: none    Goals:     Short Term Goals:  6 weeks  1.Report decreased L knee pain </=4/10 at worst  to increase tolerance for ADLs, walking, and curbs.  2. Increase knee AROM to > 90 degrees in order to be able to perform ADLs without difficulty.  3. Increase strength by 1/3 MMT grade in L LE  to increase tolerance for ADL and work activities.  4. Pt to tolerate HEP to improve ROM and independence with ADL's.     Long Term Goals: 12 weeks  1.Report decreased L knee pain </= 1/10 at worst  to increase tolerance for work activities and ADLs.  2.Increase strength to >/= 4+/5 in BLEto increase tolerance for ADL and work activities.  3. Pt will report at </= 40% impaired on KNEE FOTO to demonstrate increase in LE function with every day tasks.   4. Pt will negotiate 8 steps and ambulate community distances at >/= Mod I for increased functional mobility.  5. Increase knee ROM to WNL in order to be able to perform ADLs without difficulty.    Plan     Cont POC. Gait train with PWB and 2 crutches next    Dora Telles, PT      English

## 2022-09-06 ENCOUNTER — PATIENT MESSAGE (OUTPATIENT)
Dept: OBSTETRICS AND GYNECOLOGY | Facility: CLINIC | Age: 27
End: 2022-09-06
Payer: MEDICAID

## 2022-09-07 DIAGNOSIS — N72 CERVICITIS: Primary | ICD-10-CM

## 2022-09-07 RX ORDER — AZITHROMYCIN 500 MG/1
TABLET, FILM COATED ORAL
Qty: 4 TABLET | Refills: 1 | Status: SHIPPED | OUTPATIENT
Start: 2022-09-07

## 2022-11-22 ENCOUNTER — OFFICE VISIT (OUTPATIENT)
Dept: OBSTETRICS AND GYNECOLOGY | Facility: CLINIC | Age: 27
End: 2022-11-22
Payer: MEDICAID

## 2022-11-22 VITALS
WEIGHT: 209.19 LBS | HEIGHT: 67 IN | BODY MASS INDEX: 32.83 KG/M2 | DIASTOLIC BLOOD PRESSURE: 71 MMHG | SYSTOLIC BLOOD PRESSURE: 110 MMHG

## 2022-11-22 DIAGNOSIS — Z01.419 ROUTINE GYNECOLOGICAL EXAMINATION: Primary | ICD-10-CM

## 2022-11-22 DIAGNOSIS — Z11.3 SCREENING EXAMINATION FOR STD (SEXUALLY TRANSMITTED DISEASE): ICD-10-CM

## 2022-11-22 DIAGNOSIS — Z12.4 CERVICAL CANCER SCREENING: ICD-10-CM

## 2022-11-22 PROCEDURE — 1159F PR MEDICATION LIST DOCUMENTED IN MEDICAL RECORD: ICD-10-PCS | Mod: CPTII,,, | Performed by: OBSTETRICS & GYNECOLOGY

## 2022-11-22 PROCEDURE — 3078F PR MOST RECENT DIASTOLIC BLOOD PRESSURE < 80 MM HG: ICD-10-PCS | Mod: CPTII,,, | Performed by: OBSTETRICS & GYNECOLOGY

## 2022-11-22 PROCEDURE — 87491 CHLMYD TRACH DNA AMP PROBE: CPT | Performed by: OBSTETRICS & GYNECOLOGY

## 2022-11-22 PROCEDURE — 99999 PR PBB SHADOW E&M-EST. PATIENT-LVL II: CPT | Mod: PBBFAC,,, | Performed by: OBSTETRICS & GYNECOLOGY

## 2022-11-22 PROCEDURE — 87591 N.GONORRHOEAE DNA AMP PROB: CPT | Performed by: OBSTETRICS & GYNECOLOGY

## 2022-11-22 PROCEDURE — 88175 CYTOPATH C/V AUTO FLUID REDO: CPT | Performed by: OBSTETRICS & GYNECOLOGY

## 2022-11-22 PROCEDURE — 99212 OFFICE O/P EST SF 10 MIN: CPT | Mod: PBBFAC,PO | Performed by: OBSTETRICS & GYNECOLOGY

## 2022-11-22 PROCEDURE — 99395 PREV VISIT EST AGE 18-39: CPT | Mod: S$PBB,,, | Performed by: OBSTETRICS & GYNECOLOGY

## 2022-11-22 PROCEDURE — 3074F PR MOST RECENT SYSTOLIC BLOOD PRESSURE < 130 MM HG: ICD-10-PCS | Mod: CPTII,,, | Performed by: OBSTETRICS & GYNECOLOGY

## 2022-11-22 PROCEDURE — 3078F DIAST BP <80 MM HG: CPT | Mod: CPTII,,, | Performed by: OBSTETRICS & GYNECOLOGY

## 2022-11-22 PROCEDURE — 99999 PR PBB SHADOW E&M-EST. PATIENT-LVL II: ICD-10-PCS | Mod: PBBFAC,,, | Performed by: OBSTETRICS & GYNECOLOGY

## 2022-11-22 PROCEDURE — 99395 PR PREVENTIVE VISIT,EST,18-39: ICD-10-PCS | Mod: S$PBB,,, | Performed by: OBSTETRICS & GYNECOLOGY

## 2022-11-22 PROCEDURE — 1159F MED LIST DOCD IN RCRD: CPT | Mod: CPTII,,, | Performed by: OBSTETRICS & GYNECOLOGY

## 2022-11-22 PROCEDURE — 3074F SYST BP LT 130 MM HG: CPT | Mod: CPTII,,, | Performed by: OBSTETRICS & GYNECOLOGY

## 2022-11-22 PROCEDURE — 3008F PR BODY MASS INDEX (BMI) DOCUMENTED: ICD-10-PCS | Mod: CPTII,,, | Performed by: OBSTETRICS & GYNECOLOGY

## 2022-11-22 PROCEDURE — 3008F BODY MASS INDEX DOCD: CPT | Mod: CPTII,,, | Performed by: OBSTETRICS & GYNECOLOGY

## 2022-11-22 NOTE — PROGRESS NOTES
"28 yo  female who presents for routine gyn visit.  Reports that cycles are q month.  No concerns about her cycle.  Has one new  male partner.  Ok with std testing.    ROS:  GENERAL: Denies weight gain or weight loss. Feeling well overall.   SKIN: Denies rash or lesions.   HEAD: Denies head injury or headache.   CHEST: Denies chest pain or shortness of breath.   CARDIOVASCULAR: Denies palpitations or left sided chest pain.   ABDOMEN: no pain today  URINARY: dysuria  REPRODUCTIVE: See HPI.   BREASTS:  denies pain, lumps, or nipple discharge.   HEMATOLOGIC: No easy bruisability or excessive bleeding.   MUSCULOSKELETAL: Denies joint pain or swelling.   NEUROLOGIC: Denies syncope or weakness.   PSYCHIATRIC: Denies depression, anxiety or mood swings.         PE:   Vitals: /71   Ht 5' 7" (1.702 m)   Wt 94.9 kg (209 lb 3.5 oz)   LMP 10/31/2022   BMI 32.77 kg/m²   APPEARANCE: Well nourished, well developed, in no acute distress.  SKIN: Normal skin turgor, no lesions.  ABDOMEN: Soft. No tenderness or masses. No hepatosplenomegaly. No hernias.  BREASTS: Symmetrical, no skin changes or visible lesions. No palpable masses, nipple discharge or adenopathy bilaterally.  PELVIC: Normal external female genitalia without lesions. Normal hair distribution. Adequate perineal body, normal urethral meatus. Vagina moist and well rugated without lesions, white discharge. Cervix pink and without lesions. No significant cystocele or rectocele. Bimanual exam showed uterus normal size, shape, position, mobile and nontender. Adnexa without masses or tenderness. Urethra and bladder normal.    AP  Routine gyn  -s/p normal breast exam:   -s/p normal pelvic exam:   -Pap collected  -STD testing:  Gc/chl collected  -contraception: s/p BTL    DANIELA luke MD      "

## 2022-11-24 LAB
C TRACH DNA SPEC QL NAA+PROBE: NOT DETECTED
N GONORRHOEA DNA SPEC QL NAA+PROBE: NOT DETECTED

## 2022-11-30 LAB
FINAL PATHOLOGIC DIAGNOSIS: NORMAL
Lab: NORMAL

## 2023-07-21 ENCOUNTER — PATIENT MESSAGE (OUTPATIENT)
Dept: OBSTETRICS AND GYNECOLOGY | Facility: CLINIC | Age: 28
End: 2023-07-21
Payer: MEDICAID

## 2023-07-21 DIAGNOSIS — R30.0 DYSURIA: Primary | ICD-10-CM

## 2023-07-21 RX ORDER — CIPROFLOXACIN 250 MG/1
250 TABLET, FILM COATED ORAL 2 TIMES DAILY
Qty: 6 TABLET | Refills: 0 | Status: SHIPPED | OUTPATIENT
Start: 2023-07-21 | End: 2023-07-24

## 2023-11-27 ENCOUNTER — OFFICE VISIT (OUTPATIENT)
Dept: OBSTETRICS AND GYNECOLOGY | Facility: CLINIC | Age: 28
End: 2023-11-27
Payer: MEDICAID

## 2023-11-27 VITALS
DIASTOLIC BLOOD PRESSURE: 81 MMHG | SYSTOLIC BLOOD PRESSURE: 137 MMHG | WEIGHT: 222.44 LBS | BODY MASS INDEX: 34.84 KG/M2

## 2023-11-27 DIAGNOSIS — R35.0 INCREASED URINARY FREQUENCY: ICD-10-CM

## 2023-11-27 DIAGNOSIS — Z01.419 ROUTINE GYNECOLOGICAL EXAMINATION: Primary | ICD-10-CM

## 2023-11-27 DIAGNOSIS — Z11.4 ENCOUNTER FOR SCREENING FOR HIV: ICD-10-CM

## 2023-11-27 DIAGNOSIS — Z12.4 CERVICAL CANCER SCREENING: ICD-10-CM

## 2023-11-27 DIAGNOSIS — Z11.3 SCREENING EXAMINATION FOR STD (SEXUALLY TRANSMITTED DISEASE): ICD-10-CM

## 2023-11-27 PROCEDURE — 99999 PR PBB SHADOW E&M-EST. PATIENT-LVL II: ICD-10-PCS | Mod: PBBFAC,,, | Performed by: OBSTETRICS & GYNECOLOGY

## 2023-11-27 PROCEDURE — 3008F BODY MASS INDEX DOCD: CPT | Mod: CPTII,,, | Performed by: OBSTETRICS & GYNECOLOGY

## 2023-11-27 PROCEDURE — 88175 CYTOPATH C/V AUTO FLUID REDO: CPT | Performed by: OBSTETRICS & GYNECOLOGY

## 2023-11-27 PROCEDURE — 99212 OFFICE O/P EST SF 10 MIN: CPT | Mod: PBBFAC,PO | Performed by: OBSTETRICS & GYNECOLOGY

## 2023-11-27 PROCEDURE — 3079F PR MOST RECENT DIASTOLIC BLOOD PRESSURE 80-89 MM HG: ICD-10-PCS | Mod: CPTII,,, | Performed by: OBSTETRICS & GYNECOLOGY

## 2023-11-27 PROCEDURE — 3075F PR MOST RECENT SYSTOLIC BLOOD PRESS GE 130-139MM HG: ICD-10-PCS | Mod: CPTII,,, | Performed by: OBSTETRICS & GYNECOLOGY

## 2023-11-27 PROCEDURE — 3008F PR BODY MASS INDEX (BMI) DOCUMENTED: ICD-10-PCS | Mod: CPTII,,, | Performed by: OBSTETRICS & GYNECOLOGY

## 2023-11-27 PROCEDURE — 3075F SYST BP GE 130 - 139MM HG: CPT | Mod: CPTII,,, | Performed by: OBSTETRICS & GYNECOLOGY

## 2023-11-27 PROCEDURE — 87086 URINE CULTURE/COLONY COUNT: CPT | Performed by: OBSTETRICS & GYNECOLOGY

## 2023-11-27 PROCEDURE — 1159F MED LIST DOCD IN RCRD: CPT | Mod: CPTII,,, | Performed by: OBSTETRICS & GYNECOLOGY

## 2023-11-27 PROCEDURE — 87591 N.GONORRHOEAE DNA AMP PROB: CPT | Performed by: OBSTETRICS & GYNECOLOGY

## 2023-11-27 PROCEDURE — 3079F DIAST BP 80-89 MM HG: CPT | Mod: CPTII,,, | Performed by: OBSTETRICS & GYNECOLOGY

## 2023-11-27 PROCEDURE — 99395 PREV VISIT EST AGE 18-39: CPT | Mod: S$PBB,,, | Performed by: OBSTETRICS & GYNECOLOGY

## 2023-11-27 PROCEDURE — 99395 PR PREVENTIVE VISIT,EST,18-39: ICD-10-PCS | Mod: S$PBB,,, | Performed by: OBSTETRICS & GYNECOLOGY

## 2023-11-27 PROCEDURE — 99999 PR PBB SHADOW E&M-EST. PATIENT-LVL II: CPT | Mod: PBBFAC,,, | Performed by: OBSTETRICS & GYNECOLOGY

## 2023-11-27 PROCEDURE — 87491 CHLMYD TRACH DNA AMP PROBE: CPT | Performed by: OBSTETRICS & GYNECOLOGY

## 2023-11-27 PROCEDURE — 1159F PR MEDICATION LIST DOCUMENTED IN MEDICAL RECORD: ICD-10-PCS | Mod: CPTII,,, | Performed by: OBSTETRICS & GYNECOLOGY

## 2023-11-27 NOTE — PROGRESS NOTES
27 yo  female who presents for routine gyn visit.  Reports that cycles are q month.  No concerns about her cycle. Can have more cramping since BTL  Ok with std testing.  Increased urinary frequency for about 3-4 months.    ROS:  GENERAL: Denies weight gain or weight loss. Feeling well overall.   SKIN: Denies rash or lesions.   HEAD: Denies head injury or headache.   CHEST: Denies chest pain or shortness of breath.   CARDIOVASCULAR: Denies palpitations or left sided chest pain.   ABDOMEN: no pain today  URINARY: dysuria  REPRODUCTIVE: See HPI.   BREASTS:  denies pain, lumps, or nipple discharge.   HEMATOLOGIC: No easy bruisability or excessive bleeding.   MUSCULOSKELETAL: Denies joint pain or swelling.   NEUROLOGIC: Denies syncope or weakness.   PSYCHIATRIC: Denies depression, anxiety or mood swings.         PE:   Vitals: /81   Wt 100.9 kg (222 lb 7.1 oz)   LMP 2023 (Exact Date)   BMI 34.84 kg/m²   APPEARANCE: Well nourished, well developed, in no acute distress.  SKIN: Normal skin turgor, no lesions.  ABDOMEN: Soft. No tenderness or masses. No hepatosplenomegaly. No hernias.  BREASTS: Symmetrical, no skin changes or visible lesions. No palpable masses, nipple discharge or adenopathy bilaterally.  PELVIC: Normal external female genitalia without lesions. Normal hair distribution. Adequate perineal body, normal urethral meatus. Vagina moist and well rugated without lesions, white discharge. Cervix pink and without lesions. No significant cystocele or rectocele. Bimanual exam showed uterus normal size, shape, position, mobile and nontender. Adnexa without masses or tenderness. Urethra and bladder normal.    AP  Routine gyn  -s/p normal breast exam:   -s/p normal pelvic exam:   -Pap collected  -STD testing:  Gc/chl and HIV collected  -increased urinary frequency: urine cx sent to check for infection  -contraception: s/p BTL    DANIELA luke MD

## 2023-11-28 LAB
C TRACH DNA SPEC QL NAA+PROBE: NOT DETECTED
N GONORRHOEA DNA SPEC QL NAA+PROBE: NOT DETECTED

## 2023-11-29 LAB
BACTERIA UR CULT: NORMAL
BACTERIA UR CULT: NORMAL

## 2023-12-04 LAB
FINAL PATHOLOGIC DIAGNOSIS: NORMAL
Lab: NORMAL

## 2023-12-15 NOTE — PROGRESS NOTES
pap is normal    Your pelvic exam will still need to occur once a year!    See you then!    Dr luke

## 2024-01-31 ENCOUNTER — PATIENT MESSAGE (OUTPATIENT)
Dept: OBSTETRICS AND GYNECOLOGY | Facility: CLINIC | Age: 29
End: 2024-01-31
Payer: MEDICAID

## 2024-05-08 ENCOUNTER — OFFICE VISIT (OUTPATIENT)
Dept: OBSTETRICS AND GYNECOLOGY | Facility: CLINIC | Age: 29
End: 2024-05-08
Payer: MEDICAID

## 2024-05-08 VITALS — BODY MASS INDEX: 34.25 KG/M2 | WEIGHT: 218.69 LBS

## 2024-05-08 DIAGNOSIS — Z11.3 SCREENING EXAMINATION FOR STD (SEXUALLY TRANSMITTED DISEASE): Primary | ICD-10-CM

## 2024-05-08 PROCEDURE — 1159F MED LIST DOCD IN RCRD: CPT | Mod: CPTII,,, | Performed by: OBSTETRICS & GYNECOLOGY

## 2024-05-08 PROCEDURE — 99212 OFFICE O/P EST SF 10 MIN: CPT | Mod: S$PBB,,, | Performed by: OBSTETRICS & GYNECOLOGY

## 2024-05-08 PROCEDURE — 99212 OFFICE O/P EST SF 10 MIN: CPT | Mod: PBBFAC,PO | Performed by: OBSTETRICS & GYNECOLOGY

## 2024-05-08 PROCEDURE — 3008F BODY MASS INDEX DOCD: CPT | Mod: CPTII,,, | Performed by: OBSTETRICS & GYNECOLOGY

## 2024-05-08 PROCEDURE — 99999 PR PBB SHADOW E&M-EST. PATIENT-LVL II: CPT | Mod: PBBFAC,,, | Performed by: OBSTETRICS & GYNECOLOGY

## 2024-05-08 PROCEDURE — 87591 N.GONORRHOEAE DNA AMP PROB: CPT | Performed by: OBSTETRICS & GYNECOLOGY

## 2024-05-08 NOTE — PROGRESS NOTES
Patient wants std testing.  Declines exam today.  Has enough urine for evaluation.  Urine gc/chl sent.  Recently had blood work for stds completed.    DANIELA luke MD

## 2024-05-11 LAB
C TRACH DNA SPEC QL NAA+PROBE: NOT DETECTED
N GONORRHOEA DNA SPEC QL NAA+PROBE: NOT DETECTED

## 2024-09-30 ENCOUNTER — TELEPHONE (OUTPATIENT)
Dept: OBSTETRICS AND GYNECOLOGY | Facility: CLINIC | Age: 29
End: 2024-09-30
Payer: MEDICAID

## 2024-09-30 NOTE — TELEPHONE ENCOUNTER
Lvm for pt to call back.    Will need to reschedule pt appt on 11/11 as Dr. Manzo is out of the office that afternoon.

## 2024-11-08 ENCOUNTER — PATIENT MESSAGE (OUTPATIENT)
Dept: OBSTETRICS AND GYNECOLOGY | Facility: CLINIC | Age: 29
End: 2024-11-08
Payer: OTHER MISCELLANEOUS

## 2025-02-10 ENCOUNTER — HOSPITAL ENCOUNTER (EMERGENCY)
Facility: HOSPITAL | Age: 30
Discharge: HOME | End: 2025-02-10
Attending: EMERGENCY MEDICINE

## 2025-02-10 VITALS
DIASTOLIC BLOOD PRESSURE: 77 MMHG | BODY MASS INDEX: 30.61 KG/M2 | HEIGHT: 67 IN | OXYGEN SATURATION: 100 % | SYSTOLIC BLOOD PRESSURE: 145 MMHG | TEMPERATURE: 98 F | WEIGHT: 195 LBS | HEART RATE: 73 BPM | RESPIRATION RATE: 19 BRPM
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
## 2025-02-10 LAB
B-HCG UR QL: NEGATIVE
CTP QC/QA: YES

## 2025-02-10 PROCEDURE — 99284 EMERGENCY DEPT VISIT MOD MDM: CPT | Mod: 25,ER

## 2025-02-10 PROCEDURE — 25000003 PHARM REV CODE 250: Mod: ER

## 2025-02-10 PROCEDURE — 81025 URINE PREGNANCY TEST: CPT | Mod: ER

## 2025-02-10 RX ORDER — NAPROXEN 500 MG/1
500 TABLET ORAL 2 TIMES DAILY
Qty: 60 TABLET | Refills: 0 | Status: SHIPPED | OUTPATIENT
Start: 2025-02-10

## 2025-02-10 RX ORDER — LIDOCAINE 50 MG/G
1 PATCH TOPICAL DAILY
Qty: 8 PATCH | Refills: 0 | Status: SHIPPED | OUTPATIENT
Start: 2025-02-10

## 2025-02-10 RX ORDER — LIDOCAINE 50 MG/G
1 PATCH TOPICAL
Status: DISCONTINUED | OUTPATIENT
Start: 2025-02-10 | End: 2025-02-10 | Stop reason: HOSPADM

## 2025-02-10 RX ORDER — METHOCARBAMOL 500 MG/1
500 TABLET, FILM COATED ORAL 4 TIMES DAILY
Qty: 40 TABLET | Refills: 0 | Status: SHIPPED | OUTPATIENT
Start: 2025-02-10 | End: 2025-02-20

## 2025-02-10 RX ORDER — ACETAMINOPHEN 500 MG
500 TABLET ORAL EVERY 6 HOURS PRN
COMMUNITY

## 2025-02-10 RX ORDER — NAPROXEN 250 MG/1
500 TABLET ORAL
Status: COMPLETED | OUTPATIENT
Start: 2025-02-10 | End: 2025-02-10

## 2025-02-10 RX ADMIN — LIDOCAINE 1 PATCH: 50 PATCH CUTANEOUS at 01:02

## 2025-02-10 RX ADMIN — NAPROXEN 500 MG: 250 TABLET ORAL at 01:02

## 2025-02-10 NOTE — DISCHARGE INSTRUCTIONS
Ms. Charlton,     Thank you for letting me care for you today! It was nice meeting you, and I hope you feel better soon.   If you would like access to your chart and what was done today please utilize the [facility].   Please don't hesitate to return if your symptoms worsen or you develop any other worrisome symptoms.    Our goal in the emergency department is to always give you outstanding care and exceptional service. You may receive a survey by mail or e-mail in the next week regarding your experience in our ED. We would greatly appreciate you completing and returning the survey. Your feedback provides us with a way to recognize our staff who give very good care and it helps us learn how to improve when your experience was below our aspiration of excellence.     Sincerely,    Tessa Boland PA-C  Emergency Department Physician Assistant  [facility], [place], and [place]
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
## 2025-03-19 ENCOUNTER — OFFICE VISIT (OUTPATIENT)
Dept: OBSTETRICS AND GYNECOLOGY | Facility: CLINIC | Age: 30
End: 2025-03-19

## 2025-03-19 VITALS
DIASTOLIC BLOOD PRESSURE: 81 MMHG | BODY MASS INDEX: 31.39 KG/M2 | WEIGHT: 200.38 LBS | SYSTOLIC BLOOD PRESSURE: 121 MMHG | HEART RATE: 81 BPM

## 2025-03-19 DIAGNOSIS — Z11.3 SCREENING EXAMINATION FOR STD (SEXUALLY TRANSMITTED DISEASE): ICD-10-CM

## 2025-03-19 DIAGNOSIS — Z01.419 ROUTINE GYNECOLOGICAL EXAMINATION: Primary | ICD-10-CM

## 2025-03-19 DIAGNOSIS — Z12.4 CERVICAL CANCER SCREENING: ICD-10-CM

## 2025-03-19 PROCEDURE — 99999 PR PBB SHADOW E&M-EST. PATIENT-LVL III: CPT | Mod: PBBFAC,,, | Performed by: OBSTETRICS & GYNECOLOGY

## 2025-03-19 PROCEDURE — 99213 OFFICE O/P EST LOW 20 MIN: CPT | Mod: PBBFAC,PO | Performed by: OBSTETRICS & GYNECOLOGY

## 2025-03-19 PROCEDURE — 87591 N.GONORRHOEAE DNA AMP PROB: CPT | Performed by: OBSTETRICS & GYNECOLOGY
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
## 2025-03-21 ENCOUNTER — PATIENT MESSAGE (OUTPATIENT)
Dept: OBSTETRICS AND GYNECOLOGY | Facility: CLINIC | Age: 30
End: 2025-03-21
Payer: OTHER MISCELLANEOUS

## 2025-03-21 LAB
C TRACH DNA SPEC QL NAA+PROBE: NOT DETECTED
N GONORRHOEA DNA SPEC QL NAA+PROBE: NOT DETECTED

## 2025-03-24 ENCOUNTER — RESULTS FOLLOW-UP (OUTPATIENT)
Dept: OBSTETRICS AND GYNECOLOGY | Facility: HOSPITAL | Age: 30
End: 2025-03-24

## 2025-03-25 NOTE — PROGRESS NOTES
pap is normal  Your pelvic exam will still need to occur once a year!  No gonorrhea  No chlamydia    See you then!    Dr luke

## 2025-08-11 ENCOUNTER — OFFICE VISIT (OUTPATIENT)
Dept: OBSTETRICS AND GYNECOLOGY | Facility: CLINIC | Age: 30
End: 2025-08-11
Payer: MEDICAID

## 2025-08-11 ENCOUNTER — LAB VISIT (OUTPATIENT)
Dept: LAB | Facility: HOSPITAL | Age: 30
End: 2025-08-11
Attending: OBSTETRICS & GYNECOLOGY
Payer: MEDICAID

## 2025-08-11 VITALS
BODY MASS INDEX: 32.32 KG/M2 | SYSTOLIC BLOOD PRESSURE: 138 MMHG | WEIGHT: 206.38 LBS | DIASTOLIC BLOOD PRESSURE: 81 MMHG

## 2025-08-11 DIAGNOSIS — Z11.3 SCREENING EXAMINATION FOR STD (SEXUALLY TRANSMITTED DISEASE): ICD-10-CM

## 2025-08-11 DIAGNOSIS — Z11.3 SCREENING EXAMINATION FOR STD (SEXUALLY TRANSMITTED DISEASE): Primary | ICD-10-CM

## 2025-08-11 DIAGNOSIS — N89.8 VAGINAL ITCHING: ICD-10-CM

## 2025-08-11 LAB
HIV 1+2 AB+HIV1 P24 AG SERPL QL IA: NORMAL
T PALLIDUM IGG+IGM SER QL: NORMAL

## 2025-08-11 PROCEDURE — 99213 OFFICE O/P EST LOW 20 MIN: CPT | Mod: S$PBB,,, | Performed by: OBSTETRICS & GYNECOLOGY

## 2025-08-11 PROCEDURE — 99213 OFFICE O/P EST LOW 20 MIN: CPT | Mod: PBBFAC,PO | Performed by: OBSTETRICS & GYNECOLOGY

## 2025-08-11 PROCEDURE — 99999 PR PBB SHADOW E&M-EST. PATIENT-LVL III: CPT | Mod: PBBFAC,,, | Performed by: OBSTETRICS & GYNECOLOGY

## 2025-08-11 PROCEDURE — 87389 HIV-1 AG W/HIV-1&-2 AB AG IA: CPT

## 2025-08-11 PROCEDURE — 87491 CHLMYD TRACH DNA AMP PROBE: CPT | Performed by: OBSTETRICS & GYNECOLOGY

## 2025-08-11 PROCEDURE — 1159F MED LIST DOCD IN RCRD: CPT | Mod: CPTII,,, | Performed by: OBSTETRICS & GYNECOLOGY

## 2025-08-11 PROCEDURE — 86593 SYPHILIS TEST NON-TREP QUANT: CPT

## 2025-08-11 PROCEDURE — 36415 COLL VENOUS BLD VENIPUNCTURE: CPT

## 2025-08-11 PROCEDURE — 3079F DIAST BP 80-89 MM HG: CPT | Mod: CPTII,,, | Performed by: OBSTETRICS & GYNECOLOGY

## 2025-08-11 PROCEDURE — 3075F SYST BP GE 130 - 139MM HG: CPT | Mod: CPTII,,, | Performed by: OBSTETRICS & GYNECOLOGY

## 2025-08-11 PROCEDURE — 81515 NFCT DS BV&VAGINITIS DNA ALG: CPT | Performed by: OBSTETRICS & GYNECOLOGY

## 2025-08-11 PROCEDURE — 3008F BODY MASS INDEX DOCD: CPT | Mod: CPTII,,, | Performed by: OBSTETRICS & GYNECOLOGY

## 2025-08-11 RX ORDER — FLUCONAZOLE 150 MG/1
150 TABLET ORAL
Qty: 3 TABLET | Refills: 12 | Status: SHIPPED | OUTPATIENT
Start: 2025-08-11

## 2025-08-13 LAB
BACTERIAL VAGINOSIS DNA (OHS): DETECTED
CANDIDA GLABRATA/KRUSEI DNA (OHS): NOT DETECTED
CANDIDA SPECIES DNA (OHS): NOT DETECTED
TRICHOMONAS VAGINALIS DNA (OHS): NOT DETECTED

## 2025-08-15 LAB
C TRACH DNA SPEC QL NAA+PROBE: NOT DETECTED
CTGC SOURCE (OHS) ORD-325: NORMAL
N GONORRHOEA DNA UR QL NAA+PROBE: NOT DETECTED

## 2025-08-18 DIAGNOSIS — B96.89 BV (BACTERIAL VAGINOSIS): Primary | ICD-10-CM

## 2025-08-18 DIAGNOSIS — N76.0 BV (BACTERIAL VAGINOSIS): Primary | ICD-10-CM

## 2025-08-18 RX ORDER — METRONIDAZOLE 500 MG/1
500 TABLET ORAL
Qty: 14 TABLET | Refills: 0 | Status: SHIPPED | OUTPATIENT
Start: 2025-08-18 | End: 2025-08-25

## (undated) DEVICE — BLADE SURG CARBON STEEL #10

## (undated) DEVICE — SEE MEDLINE ITEM 146292

## (undated) DEVICE — TUBING 4-LEAD ARTHOSCOPY

## (undated) DEVICE — SEE MEDLINE ITEM 154981

## (undated) DEVICE — BIT DRILL SENTINEL 10.5MMX9IN

## (undated) DEVICE — DRAPE EMERALD 87X114.75X113

## (undated) DEVICE — DRAPE STERI INSTRUMENT 1018

## (undated) DEVICE — BLADE SURG CARBON STEEL SZ11

## (undated) DEVICE — DRAPE STERI U-SHAPED 47X51IN

## (undated) DEVICE — MANIFOLD 4 PORT

## (undated) DEVICE — NDL 18GA X1 1/2 REG BEVEL

## (undated) DEVICE — SCREW BONE MATRYX 7X25MM
Type: IMPLANTABLE DEVICE | Site: KNEE | Status: NON-FUNCTIONAL
Removed: 2018-08-30

## (undated) DEVICE — TOURNIQUET SB QC DP 44X4IN

## (undated) DEVICE — KIT ANATOMIC ACL DISPOSABLE

## (undated) DEVICE — Device

## (undated) DEVICE — SEALER LIGASURE LAP 37CM 5MM

## (undated) DEVICE — SEE MEDLINE ITEM 156955

## (undated) DEVICE — SHEET DRAPE MEDIUM

## (undated) DEVICE — BRACE KNEE T SCOPE PREMIER

## (undated) DEVICE — SEE MEDLINE ITEM 157117

## (undated) DEVICE — BAG TISS RETRV MONARCH 10MM

## (undated) DEVICE — BIT DRILL 10X229 CANN SENTINEL

## (undated) DEVICE — SUT 1 36IN PDS II

## (undated) DEVICE — PADDING CAST SPECIALIST 6X4YD

## (undated) DEVICE — APPLICATOR CHLORAPREP ORN 26ML

## (undated) DEVICE — DRAPE LAVH LAPAROSCOPY W/FLUID

## (undated) DEVICE — PAD PREP 50/CA

## (undated) DEVICE — ELECTRODE REM PLYHSV RETURN 9

## (undated) DEVICE — SEE MEDLINE ITEM 146231

## (undated) DEVICE — COVER OVERHEAD SURG LT BLUE

## (undated) DEVICE — BLADE SURG #15 CARBON STEEL

## (undated) DEVICE — GLOVE PROTEXIS HYDROGEL SZ6.5

## (undated) DEVICE — PAD ABDOMINAL 5X9 STERILE

## (undated) DEVICE — NDL SPINAL 18GX3.5 SPINOCAN

## (undated) DEVICE — PACK ARTHROSCOPY W/ISO BAC

## (undated) DEVICE — GAUZE SPONGE 4X4 12PLY

## (undated) DEVICE — SUT LOOP 2 HF 20 SN WH BL

## (undated) DEVICE — DRESSING XEROFORM FOIL PK 1X8

## (undated) DEVICE — SEE MEDLINE ITEM 157116

## (undated) DEVICE — TUBING INSUFFLATION 10

## (undated) DEVICE — TUBE SET INFLOW/OUTFLOW

## (undated) DEVICE — SEE MEDLINE ITEM 157131

## (undated) DEVICE — SEE MEDLINE ITEM 146355

## (undated) DEVICE — PACK BASIC

## (undated) DEVICE — ADHESIVE MASTISOL VIAL 48/BX

## (undated) DEVICE — SUT MONOCRYL 3-0 PS-2 UND

## (undated) DEVICE — SEE MEDLINE ITEM 152523

## (undated) DEVICE — DRAPE PLASTIC U 60X72

## (undated) DEVICE — DRESSING AQUACEL SACRAL 9 X 9

## (undated) DEVICE — SCREW BA GENESYS MATRYX 9X25
Type: IMPLANTABLE DEVICE | Site: KNEE | Status: NON-FUNCTIONAL
Removed: 2018-08-30

## (undated) DEVICE — GLOVE 8 PROTEXIS PI BLUE

## (undated) DEVICE — GOWN SURGICAL XX LARGE X LONG

## (undated) DEVICE — LOOP PASSING HI-FI

## (undated) DEVICE — SUT JJ41G O VICRYL

## (undated) DEVICE — SUT ETHILON 3-0 BLK MONO FS

## (undated) DEVICE — GLOVE 6.5 PROTEXIS PI BLUE

## (undated) DEVICE — DRAPE INCISE IOBAN 2 23X33IN

## (undated) DEVICE — SEE MEDLINE ITEM 152530

## (undated) DEVICE — SYR 30CC LUER LOCK

## (undated) DEVICE — PAD CAST SPECIALIST STRL 6

## (undated) DEVICE — SUT VICRYL PLUS 0 CT1 18IN

## (undated) DEVICE — BLADE SAGITTA 5/BX

## (undated) DEVICE — DRAPE C-ARM/MOBILE XRAY 44X80

## (undated) DEVICE — TOURNIQUET SB QC DP 34X4IN

## (undated) DEVICE — COVER BACK TABLE 72X21

## (undated) DEVICE — SEE MEDLINE ITEM 146313

## (undated) DEVICE — INSTRUMENT SUCTION FRAZIER 12F

## (undated) DEVICE — TROCAR ENDOPATH XCEL 5X75MM

## (undated) DEVICE — SEE MEDLINE ITEM 157216

## (undated) DEVICE — GLOVE 8 PROTEXIS PI ORTHO

## (undated) DEVICE — SYR 10CC LUER LOCK

## (undated) DEVICE — SEE MEDLINE ITEM 146345

## (undated) DEVICE — REAMER GRAFTMAX FLEX 5MM

## (undated) DEVICE — ADHESIVE DERMABOND ADVANCED

## (undated) DEVICE — NDL HYPO REG 25G X 1 1/2

## (undated) DEVICE — PULSAVAC ZIMMER

## (undated) DEVICE — BIT DRILL SOFT TISSUE 2.8MM

## (undated) DEVICE — SEE MEDLINE ITEM 152622

## (undated) DEVICE — SUT HSE FIBER 36IN MO6 NDL

## (undated) DEVICE — SPONGE LAP 18X18 PREWASHED

## (undated) DEVICE — SUT HIFI 2 36IN HIGH STRNGT

## (undated) DEVICE — STRIP STERI REIN CLSR 1/2X2IN

## (undated) DEVICE — STAPLER SKIN ROTATING HEAD

## (undated) DEVICE — DRESSING SPONGE 16PLY 4X4 NS

## (undated) DEVICE — BLADE SHAVER 4.5 6/BX

## (undated) DEVICE — SOL IRR NACL .9% 3000ML

## (undated) DEVICE — SUTURE

## (undated) DEVICE — MAT SUCTION PUDDLEVAC ORANGE

## (undated) DEVICE — SEE MEDLINE ITEM 146271

## (undated) DEVICE — KIT ANTIFOG

## (undated) DEVICE — SUT CTD VICRYL 2.0